# Patient Record
Sex: MALE | Race: WHITE | NOT HISPANIC OR LATINO | Employment: OTHER | URBAN - NONMETROPOLITAN AREA
[De-identification: names, ages, dates, MRNs, and addresses within clinical notes are randomized per-mention and may not be internally consistent; named-entity substitution may affect disease eponyms.]

---

## 2017-10-31 ENCOUNTER — HOSPITAL ENCOUNTER (OUTPATIENT)
Dept: NUCLEAR MEDICINE | Facility: HOSPITAL | Age: 67
Setting detail: NUCLEAR MEDICINE
End: 2017-10-31
Attending: FAMILY MEDICINE
Payer: MEDICARE

## 2017-10-31 ENCOUNTER — HOSPITAL ENCOUNTER (OUTPATIENT)
Dept: CARDIOLOGY | Facility: HOSPITAL | Age: 67
Setting detail: NUCLEAR MEDICINE
End: 2017-10-31
Attending: FAMILY MEDICINE
Payer: MEDICARE

## 2017-10-31 ENCOUNTER — HOSPITAL ENCOUNTER (OUTPATIENT)
Dept: NUCLEAR MEDICINE | Facility: HOSPITAL | Age: 67
Setting detail: NUCLEAR MEDICINE
Discharge: HOME | End: 2017-10-31
Attending: FAMILY MEDICINE | Admitting: FAMILY MEDICINE
Payer: MEDICARE

## 2017-10-31 DIAGNOSIS — E11.9 CONTROLLED DIABETES MELLITUS TYPE II WITHOUT COMPLICATION (H): ICD-10-CM

## 2017-10-31 DIAGNOSIS — R68.89 EXERCISE INTOLERANCE: ICD-10-CM

## 2017-10-31 DIAGNOSIS — R06.02 SOB (SHORTNESS OF BREATH): ICD-10-CM

## 2017-10-31 PROCEDURE — A9500 TC99M SESTAMIBI: HCPCS | Performed by: RADIOLOGY

## 2017-10-31 PROCEDURE — 78452 HT MUSCLE IMAGE SPECT MULT: CPT | Mod: TC

## 2017-10-31 PROCEDURE — 93018 CV STRESS TEST I&R ONLY: CPT | Performed by: INTERNAL MEDICINE

## 2017-10-31 PROCEDURE — 34300033 ZZH RX 343: Performed by: RADIOLOGY

## 2017-10-31 PROCEDURE — 93017 CV STRESS TEST TRACING ONLY: CPT

## 2017-10-31 PROCEDURE — 93016 CV STRESS TEST SUPVJ ONLY: CPT | Performed by: INTERNAL MEDICINE

## 2017-10-31 RX ADMIN — Medication 10 MILLICURIE: at 07:15

## 2017-10-31 RX ADMIN — Medication 30 MILLICURIE: at 10:14

## 2019-03-12 ENCOUNTER — APPOINTMENT (OUTPATIENT)
Dept: GENERAL RADIOLOGY | Facility: HOSPITAL | Age: 69
End: 2019-03-12
Attending: INTERNAL MEDICINE
Payer: MEDICARE

## 2019-03-12 ENCOUNTER — HOSPITAL ENCOUNTER (EMERGENCY)
Facility: HOSPITAL | Age: 69
Discharge: HOME | End: 2019-03-12
Attending: INTERNAL MEDICINE | Admitting: INTERNAL MEDICINE
Payer: MEDICARE

## 2019-03-12 VITALS
HEART RATE: 92 BPM | SYSTOLIC BLOOD PRESSURE: 159 MMHG | RESPIRATION RATE: 16 BRPM | TEMPERATURE: 99.4 F | DIASTOLIC BLOOD PRESSURE: 118 MMHG | OXYGEN SATURATION: 92 %

## 2019-03-12 DIAGNOSIS — J10.1 INFLUENZA A: ICD-10-CM

## 2019-03-12 LAB
FLUAV+FLUBV RNA SPEC QL NAA+PROBE: NEGATIVE
FLUAV+FLUBV RNA SPEC QL NAA+PROBE: POSITIVE
GLUCOSE BLDC GLUCOMTR-MCNC: 142 MG/DL (ref 70–99)
RSV RNA SPEC NAA+PROBE: NEGATIVE
SPECIMEN SOURCE: ABNORMAL

## 2019-03-12 PROCEDURE — 94640 AIRWAY INHALATION TREATMENT: CPT

## 2019-03-12 PROCEDURE — 99284 EMERGENCY DEPT VISIT MOD MDM: CPT | Mod: 25

## 2019-03-12 PROCEDURE — 40000275 ZZH STATISTIC RCP TIME EA 10 MIN

## 2019-03-12 PROCEDURE — 94664 DEMO&/EVAL PT USE INHALER: CPT | Mod: XU

## 2019-03-12 PROCEDURE — 00000146 ZZHCL STATISTIC GLUCOSE BY METER IP

## 2019-03-12 PROCEDURE — 25000125 ZZHC RX 250: Performed by: INTERNAL MEDICINE

## 2019-03-12 PROCEDURE — 71046 X-RAY EXAM CHEST 2 VIEWS: CPT | Mod: TC

## 2019-03-12 PROCEDURE — 99284 EMERGENCY DEPT VISIT MOD MDM: CPT | Mod: Z6 | Performed by: INTERNAL MEDICINE

## 2019-03-12 PROCEDURE — 87631 RESP VIRUS 3-5 TARGETS: CPT | Performed by: INTERNAL MEDICINE

## 2019-03-12 RX ORDER — IBUPROFEN 800 MG/1
800 TABLET, FILM COATED ORAL EVERY 8 HOURS PRN
COMMUNITY
End: 2022-06-02 | Stop reason: ALTCHOICE

## 2019-03-12 RX ORDER — OSELTAMIVIR PHOSPHATE 75 MG/1
75 CAPSULE ORAL 2 TIMES DAILY
Qty: 10 CAPSULE | Refills: 0 | Status: SHIPPED | OUTPATIENT
Start: 2019-03-12 | End: 2019-03-17

## 2019-03-12 RX ORDER — IPRATROPIUM BROMIDE AND ALBUTEROL SULFATE 2.5; .5 MG/3ML; MG/3ML
3 SOLUTION RESPIRATORY (INHALATION) ONCE
Status: COMPLETED | OUTPATIENT
Start: 2019-03-12 | End: 2019-03-12

## 2019-03-12 RX ORDER — ALBUTEROL SULFATE 90 UG/1
2 AEROSOL, METERED RESPIRATORY (INHALATION) EVERY 6 HOURS PRN
Qty: 18 G | Refills: 0 | Status: SHIPPED | OUTPATIENT
Start: 2019-03-12 | End: 2020-12-22

## 2019-03-12 RX ORDER — DAPSONE 25 MG/1
25 TABLET ORAL EVERY OTHER DAY
COMMUNITY
End: 2020-12-22

## 2019-03-12 RX ORDER — CODEINE PHOSPHATE AND GUAIFENESIN 10; 100 MG/5ML; MG/5ML
1-2 SOLUTION ORAL EVERY 4 HOURS PRN
Qty: 4 OZ | Refills: 0 | Status: SHIPPED | OUTPATIENT
Start: 2019-03-12 | End: 2020-12-22

## 2019-03-12 RX ADMIN — IPRATROPIUM BROMIDE AND ALBUTEROL SULFATE 3 ML: .5; 3 SOLUTION RESPIRATORY (INHALATION) at 07:38

## 2019-03-12 NOTE — ED PROVIDER NOTES
Care of patient assumed from Dr. Bar Siddiqui at change of shift.    Influenza A is positive.  He has been ill since Sunday afternoon so he is still in the window for treatment with Tamiflu and chooses to have Tamiflu.  Also advised using a [place] pot for sinus congestion, Tylenol for aches and pains and humidifier in the bedroom at night to help with cough.    Follow-up with primary care as needed.    Dx: Influenza A     Anna Huerta MD  03/12/19 0971

## 2019-03-12 NOTE — ED AVS SNAPSHOT
[facility]  [address] [address]  Phone:  [phone]                                    Scott Akhtar   MRN: [id number]    Department:  [facility]   Date of Visit:  3/12/2019           After Visit Summary Signature Page    I have received my discharge instructions, and my questions have been answered. I have discussed any challenges I see with this plan with the nurse or doctor.    ..........................................................................................................................................  Patient/Patient Representative Signature      ..........................................................................................................................................  Patient Representative Print Name and Relationship to Patient    ..................................................               ................................................  Date                                   Time    ..........................................................................................................................................  Reviewed by Signature/Title    ...................................................              ..............................................  Date                                               Time          22EPIC Rev 08/18

## 2019-03-17 ASSESSMENT — ENCOUNTER SYMPTOMS
COUGH: 1
SHORTNESS OF BREATH: 1
SORE THROAT: 1
WHEEZING: 1
FATIGUE: 1

## 2019-03-17 NOTE — ED PROVIDER NOTES
History     Chief Complaint   Patient presents with     Cough     The history is provided by the patient.   URI   Presenting symptoms: congestion, cough, fatigue and sore throat    Severity:  Moderate  Onset quality:  Gradual  Duration:  3 days  Timing:  Constant  Chronicity:  New  Associated symptoms: wheezing      Scott Akhtar is a 68 year old male who    Allergies:  Allergies   Allergen Reactions     Pseudoephedrine Hcl      Sudafed      Simvastatin GI Disturbance     Sulfa Drugs Nausea       Problem List:    Patient Active Problem List    Diagnosis Date Noted     SNHL (sensorineural hearing loss) 2013     Priority: Medium        Past Medical History:    Past Medical History:   Diagnosis Date     Diabetes (H)      External thrombosed hemorrhoids      Hiatal hernia      Hypertension      Need for prophylactic vaccination and inoculation against unspecified single bacterial disease      Other chest pain        Past Surgical History:    Past Surgical History:   Procedure Laterality Date     BACK SURGERY      laminectomy L5S1     COLONOSCOPY       COLONOSCOPY N/A 10/14/2016    Procedure: COLONOSCOPY;  Surgeon: Daljit Salazar MD;  Location: [facility]     L5 S1 Laminectomy       ROTATOR CUFF REPAIR RT/LT  2006     TONSILLECTOMY  1971       Family History:    Family History   Problem Relation Age of Onset     Diabetes Father        Social History:  Marital Status:   [2]  Social History     Tobacco Use     Smoking status: Former Smoker     Types: Cigarettes     Last attempt to quit: 1985     Years since quittin.2     Smokeless tobacco: Never Used   Substance Use Topics     Alcohol use: Yes     Alcohol/week: 0.0 oz     Comment: 3 beers daily      Drug use: Not on file        Medications:      albuterol (PROAIR HFA/PROVENTIL HFA/VENTOLIN HFA) 108 (90 Base) MCG/ACT inhaler   ALLOPURINOL PO   ASPIRIN EC PO   Cholecalciferol (VITAMIN D3 PO)   dapsone (ACZONE) 25 MG tablet    guaiFENesin-codeine (ROBITUSSIN AC) 100-10 MG/5ML solution   HYDROCHLOROTHIAZIDE PO   ibuprofen (ADVIL/MOTRIN) 800 MG tablet   loratadine (CLARITIN) 10 MG tablet   METFORMIN HCL PO   Multiple Minerals (CALCIUM-MAGNESIUM-ZINC) TABS   Omega-3 Fatty Acids (OMEGA-3 FISH OIL PO)   oseltamivir (TAMIFLU) 75 MG capsule   Potassium Chloride (KLOR-CON 10 PO)   COLCHICINE PO         Review of Systems   Constitutional: Positive for fatigue.   HENT: Positive for congestion and sore throat.    Respiratory: Positive for cough, shortness of breath and wheezing.        Physical Exam   BP: 132/79  Pulse: 87  Temp: 99.3  F (37.4  C)  Resp: 20  SpO2: 94 %      Physical Exam   Constitutional: He is oriented to person, place, and time. He appears well-developed and well-nourished.   HENT:   Head: Normocephalic and atraumatic.   Eyes: Conjunctivae are normal. Pupils are equal, round, and reactive to light.   Neck: Normal range of motion. Neck supple. No JVD present. No tracheal deviation present. No thyromegaly present.   Cardiovascular: Normal rate, regular rhythm, normal heart sounds and intact distal pulses. Exam reveals no gallop.   No murmur heard.  Pulmonary/Chest: Effort normal and breath sounds normal. No stridor. No respiratory distress. He has no wheezes. He has no rales. He exhibits no tenderness.   Abdominal: Soft. Bowel sounds are normal. He exhibits no distension and no mass. There is no tenderness. There is no rebound and no guarding.   Musculoskeletal: Normal range of motion. He exhibits no edema or tenderness.   Lymphadenopathy:     He has no cervical adenopathy.   Neurological: He is alert and oriented to person, place, and time.   Skin: Skin is warm. No rash noted. No erythema. No pallor.   Psychiatric: His behavior is normal.   Nursing note and vitals reviewed.      ED Course        Procedures                   No results found for this or any previous visit (from the past 24 hour(s)).    Medications   ipratropium -  albuterol 0.5 mg/2.5 mg/3 mL (DUONEB) neb solution 3 mL (3 mLs Nebulization Given 3/12/19 0750)       Assessments & Plan (with Medical Decision Making)   Flu like symptoms for 3 days, feel more SOB, wheezing  Neb x 1 given  CXR ordered   S/o to Dr Martinez at the change of shift  I have reviewed the nursing notes.    I have reviewed the findings, diagnosis, plan and need for follow up with the patient.         Medication List      Started    albuterol 108 (90 Base) MCG/ACT inhaler  Commonly known as:  PROAIR HFA/PROVENTIL HFA/VENTOLIN HFA  2 puffs, Inhalation, EVERY 6 HOURS PRN     guaiFENesin-codeine 100-10 MG/5ML solution  Commonly known as:  ROBITUSSIN AC  1-2 tsp., Oral, EVERY 4 HOURS PRN     oseltamivir 75 MG capsule  Commonly known as:  TAMIFLU  75 mg, Oral, 2 TIMES DAILY            Final diagnoses:   Influenza A       3/12/2019   [facility]     Bar Gottlieb MD  03/17/19 0289

## 2020-08-24 ENCOUNTER — TRANSFERRED RECORDS (OUTPATIENT)
Dept: HEALTH INFORMATION MANAGEMENT | Facility: CLINIC | Age: 70
End: 2020-08-24

## 2020-11-09 ENCOUNTER — NURSE TRIAGE (OUTPATIENT)
Dept: FAMILY MEDICINE | Facility: OTHER | Age: 70
End: 2020-11-09

## 2020-11-09 DIAGNOSIS — Z20.822 SUSPECTED COVID-19 VIRUS INFECTION: Primary | ICD-10-CM

## 2020-11-09 NOTE — TELEPHONE ENCOUNTER
"    Reason for Disposition    [1] COVID-19 infection suspected by caller or triager AND [2] mild symptoms (cough, fever, or others) AND [3] no complications or SOB    Answer Assessment - Initial Assessment Questions  1. COVID-19 DIAGNOSIS: \"Who made your Coronavirus (COVID-19) diagnosis?\" \"Was it confirmed by a positive lab test?\" If not diagnosed by a HCP, ask \"Are there lots of cases (community spread) where you live?\" (See [facility] health department website, if unsure)      no  2. ONSET: \"When did the COVID-19 symptoms start?\"       4 weeks ago  3. WORST SYMPTOM: \"What is your worst symptom?\" (e.g., cough, fever, shortness of breath, muscle aches)      Cough, sob, body aches  4. COUGH: \"Do you have a cough?\" If so, ask: \"How bad is the cough?\"        cough  5. FEVER: \"Do you have a fever?\" If so, ask: \"What is your temperature, how was it measured, and when did it start?\"      no  6. RESPIRATORY STATUS: \"Describe your breathing?\" (e.g., shortness of breath, wheezing, unable to speak)       wheezing  7. BETTER-SAME-WORSE: \"Are you getting better, staying the same or getting worse compared to yesterday?\"  If getting worse, ask, \"In what way?\"      worse  8. HIGH RISK DISEASE: \"Do you have any chronic medical problems?\" (e.g., asthma, heart or lung disease, weak immune system, etc.)      Diabetic, lupus  9. PREGNANCY: \"Is there any chance you are pregnant?\" \"When was your last menstrual period?\"      no  10. OTHER SYMPTOMS: \"Do you have any other symptoms?\"  (e.g., chills, fatigue, headache, loss of smell or taste, muscle pain, sore throat)        Fatigue, headache, tickle throat    Protocols used: CORONAVIRUS (COVID-19) DIAGNOSED OR [id number] 8.4.20      "

## 2020-11-10 ENCOUNTER — OFFICE VISIT (OUTPATIENT)
Dept: FAMILY MEDICINE | Facility: OTHER | Age: 70
End: 2020-11-10
Attending: FAMILY MEDICINE
Payer: COMMERCIAL

## 2020-11-10 DIAGNOSIS — Z20.822 SUSPECTED COVID-19 VIRUS INFECTION: ICD-10-CM

## 2020-11-10 DIAGNOSIS — Z20.822 COVID-19 RULED OUT: Primary | ICD-10-CM

## 2020-11-10 PROCEDURE — U0003 INFECTIOUS AGENT DETECTION BY NUCLEIC ACID (DNA OR RNA); SEVERE ACUTE RESPIRATORY SYNDROME CORONAVIRUS 2 (SARS-COV-2) (CORONAVIRUS DISEASE [COVID-19]), AMPLIFIED PROBE TECHNIQUE, MAKING USE OF HIGH THROUGHPUT TECHNOLOGIES AS DESCRIBED BY CMS-2020-01-R: HCPCS | Mod: ZL | Performed by: FAMILY MEDICINE

## 2020-11-11 ENCOUNTER — HOSPITAL ENCOUNTER (EMERGENCY)
Facility: HOSPITAL | Age: 70
Discharge: HOME | End: 2020-11-11
Attending: NURSE PRACTITIONER | Admitting: NURSE PRACTITIONER
Payer: COMMERCIAL

## 2020-11-11 ENCOUNTER — APPOINTMENT (OUTPATIENT)
Dept: GENERAL RADIOLOGY | Facility: HOSPITAL | Age: 70
End: 2020-11-11
Attending: NURSE PRACTITIONER
Payer: COMMERCIAL

## 2020-11-11 VITALS
SYSTOLIC BLOOD PRESSURE: 131 MMHG | TEMPERATURE: 97.7 F | OXYGEN SATURATION: 95 % | DIASTOLIC BLOOD PRESSURE: 94 MMHG | HEART RATE: 100 BPM | RESPIRATION RATE: 16 BRPM

## 2020-11-11 DIAGNOSIS — Z87.891 PERSONAL HISTORY OF TOBACCO USE, PRESENTING HAZARDS TO HEALTH: ICD-10-CM

## 2020-11-11 DIAGNOSIS — J20.9 ACUTE BRONCHITIS WITH SYMPTOMS > 10 DAYS: Primary | ICD-10-CM

## 2020-11-11 LAB
SARS-COV-2 RNA SPEC QL NAA+PROBE: NOT DETECTED
SPECIMEN SOURCE: NORMAL

## 2020-11-11 PROCEDURE — 999N000157 HC STATISTIC RCP TIME EA 10 MIN

## 2020-11-11 PROCEDURE — 99214 OFFICE O/P EST MOD 30 MIN: CPT | Performed by: NURSE PRACTITIONER

## 2020-11-11 PROCEDURE — 94664 DEMO&/EVAL PT USE INHALER: CPT | Mod: 59

## 2020-11-11 PROCEDURE — 250N000013 HC RX MED GY IP 250 OP 250 PS 637: Performed by: NURSE PRACTITIONER

## 2020-11-11 PROCEDURE — G0463 HOSPITAL OUTPT CLINIC VISIT: HCPCS | Mod: 25

## 2020-11-11 PROCEDURE — 94640 AIRWAY INHALATION TREATMENT: CPT

## 2020-11-11 PROCEDURE — 71045 X-RAY EXAM CHEST 1 VIEW: CPT

## 2020-11-11 RX ORDER — ALBUTEROL SULFATE 90 UG/1
2 AEROSOL, METERED RESPIRATORY (INHALATION) ONCE
Status: COMPLETED | OUTPATIENT
Start: 2020-11-11 | End: 2020-11-11

## 2020-11-11 RX ORDER — INHALER, ASSIST DEVICES
SPACER (EA) MISCELLANEOUS
Qty: 1 EACH | Refills: 0 | Status: SHIPPED | OUTPATIENT
Start: 2020-11-11 | End: 2021-11-30

## 2020-11-11 RX ORDER — AZITHROMYCIN 250 MG/1
500 TABLET, FILM COATED ORAL ONCE
Status: DISCONTINUED | OUTPATIENT
Start: 2020-11-11 | End: 2020-11-11

## 2020-11-11 RX ORDER — BENZONATATE 100 MG/1
100 CAPSULE ORAL 3 TIMES DAILY PRN
Qty: 15 CAPSULE | Refills: 0 | Status: SHIPPED | OUTPATIENT
Start: 2020-11-11 | End: 2021-10-19

## 2020-11-11 RX ORDER — AZITHROMYCIN 250 MG/1
TABLET, FILM COATED ORAL
Qty: 6 TABLET | Refills: 0 | Status: SHIPPED | OUTPATIENT
Start: 2020-11-11 | End: 2020-11-16

## 2020-11-11 RX ORDER — AZITHROMYCIN 250 MG/1
250 TABLET, FILM COATED ORAL DAILY
Status: DISCONTINUED | OUTPATIENT
Start: 2020-11-12 | End: 2020-11-11

## 2020-11-11 RX ADMIN — ALBUTEROL SULFATE 2 PUFF: 90 AEROSOL, METERED RESPIRATORY (INHALATION) at 14:26

## 2020-11-11 ASSESSMENT — ENCOUNTER SYMPTOMS
VOMITING: 0
EYE ITCHING: 0
CHILLS: 0
COUGH: 1
NAUSEA: 1
SHORTNESS OF BREATH: 1
SORE THROAT: 0
DIZZINESS: 0
EYE PAIN: 0
DIARRHEA: 0
HEADACHES: 0
WHEEZING: 0
WEAKNESS: 0
PALPITATIONS: 0
TROUBLE SWALLOWING: 0
FATIGUE: 1
PSYCHIATRIC NEGATIVE: 1
ABDOMINAL PAIN: 0
FEVER: 0
EYE REDNESS: 0

## 2020-11-11 NOTE — ED PROVIDER NOTES
History     Chief Complaint   Patient presents with     Cough     c/o cough for over 3 weeks, notes cough so hard he will have dry heaves     HPI  Scott Akhtar is a 70 year old male who presents to urgent care today for complaints of cough, body aches and shortness of breath.  Denies fever, chills, vomiting, diarrhea and chest pain.  Patient states it is the nagging cough and SOB which isn't getting better.  Cough started 4-6 weeks ago and hasn't improved.  Currently waiting on COVID test results.  No other concerns.    Allergies:  Allergies   Allergen Reactions     Pseudoephedrine Hcl      Sudafed      Simvastatin GI Disturbance     Sulfa Drugs Nausea       Problem List:    Patient Active Problem List    Diagnosis Date Noted     SNHL (sensorineural hearing loss) 2013     Priority: Medium        Past Medical History:    Past Medical History:   Diagnosis Date     Diabetes (H)      External thrombosed hemorrhoids      Hiatal hernia      Hypertension      Need for prophylactic vaccination and inoculation against unspecified single bacterial disease      Other chest pain        Past Surgical History:    Past Surgical History:   Procedure Laterality Date     BACK SURGERY      laminectomy L5S1     COLONOSCOPY       COLONOSCOPY N/A 10/14/2016    Procedure: COLONOSCOPY;  Surgeon: Daljit Salazar MD;  Location: [facility]     L5 S1 Laminectomy       ROTATOR CUFF REPAIR RT/LT  2006     TONSILLECTOMY  1971       Family History:    Family History   Problem Relation Age of Onset     Diabetes Father        Social History:  Marital Status:   [2]  Social History     Tobacco Use     Smoking status: Former Smoker     Types: Cigarettes     Quit date: 1985     Years since quittin.8     Smokeless tobacco: Never Used   Substance Use Topics     Alcohol use: Yes     Alcohol/week: 0.0 standard drinks     Comment: 3 beers daily      Drug use: Not on file        Medications:         ALLOPURINOL PO        ASPIRIN EC PO       azithromycin (ZITHROMAX Z-ILDA) 250 MG tablet       benzonatate (TESSALON) 100 MG capsule       Cholecalciferol (VITAMIN D3 PO)       HYDROCHLOROTHIAZIDE PO       loratadine (CLARITIN) 10 MG tablet       METFORMIN HCL PO       Multiple Minerals (CALCIUM-MAGNESIUM-ZINC) TABS       Omega-3 Fatty Acids (OMEGA-3 FISH OIL PO)       Potassium Chloride (KLOR-CON 10 PO)       spacer (OPTICHAMBER HARRIET) holding chamber       albuterol (PROAIR HFA/PROVENTIL HFA/VENTOLIN HFA) 108 (90 Base) MCG/ACT inhaler       dapsone (ACZONE) 25 MG tablet       guaiFENesin-codeine (ROBITUSSIN AC) 100-10 MG/5ML solution       ibuprofen (ADVIL/MOTRIN) 800 MG tablet      Review of Systems   Constitutional: Positive for fatigue. Negative for chills and fever.   HENT: Negative for congestion, ear pain, sore throat and trouble swallowing.    Eyes: Negative for pain, redness and itching.   Respiratory: Positive for cough and shortness of breath. Negative for wheezing.    Cardiovascular: Negative for chest pain and palpitations.   Gastrointestinal: Positive for nausea. Negative for abdominal pain, diarrhea and vomiting.   Neurological: Negative for dizziness, weakness and headaches.   Psychiatric/Behavioral: Negative.      Physical Exam   BP: 131/94  Pulse: 100  Temp: 97.7  F (36.5  C)  Resp: 16  SpO2: 95 %    Physical Exam  Vitals signs and nursing note reviewed.   HENT:      Head: Normocephalic.      Right Ear: Tympanic membrane, ear canal and external ear normal.      Left Ear: Tympanic membrane, ear canal and external ear normal.      Nose: Nose normal.      Mouth/Throat:      Mouth: Mucous membranes are moist.      Pharynx: Oropharynx is clear.   Eyes:      Extraocular Movements: Extraocular movements intact.      Conjunctiva/sclera: Conjunctivae normal.      Pupils: Pupils are equal, round, and reactive to light.   Neck:      Musculoskeletal: Normal range of motion and neck supple.   Cardiovascular:      Rate and Rhythm:  Normal rate and regular rhythm.      Pulses: Normal pulses.      Heart sounds: Normal heart sounds.   Pulmonary:      Effort: Pulmonary effort is normal.      Breath sounds: Normal breath sounds. No wheezing.   Abdominal:      General: Bowel sounds are normal.      Palpations: Abdomen is soft.      Tenderness: There is no abdominal tenderness.   Skin:     General: Skin is warm.      Capillary Refill: Capillary refill takes less than 2 seconds.   Neurological:      General: No focal deficit present.      Mental Status: He is alert.   Psychiatric:         Mood and Affect: Mood normal.       ED Course     Results for orders placed or performed during the hospital encounter of 11/11/20 (from the past 24 hour(s))   XR Chest Port 1 View    Narrative    XR CHEST PORT 1 VW    HISTORY: 70 yearsMale cough and shortness of breath. covid test in  process.    TECHNIQUE: A single view of the chest was performed    COMPARISON: 3/12/2019    FINDINGS: Heart size and pulmonary vascularity are within normal  limits. Lungs are clear. No consolidating airspace opacities are  present.        Impression    IMPRESSION: Clear chest    JOSSELINE VALENTIN MD       Medications   albuterol (PROAIR HFA/PROVENTIL HFA/VENTOLIN HFA) 108 (90 Base) MCG/ACT inhaler 2 puff (2 puffs Inhalation Given 11/11/20 1426)       Assessments & Plan (with Medical Decision Making)     I have reviewed the nursing notes.    I have reviewed the findings, diagnosis, plan and need for follow up with the patient.  (J20.9) Acute bronchitis with symptoms > 10 days  (primary encounter diagnosis)  Plan:   Continues to wait for COVID results.  Albuterol for SOB  Tessalon pearls for cough  Zithromax as acute bronchitis 4-6 weeks in duration.    Symptomatic treatments recommended.  - Ensure you are staying hydrated by drinking plenty of fluids or eating foods such as popsicles, jello, pudding.  - Honey can be soothing for sore throat  - Warm salt water gurgles can help soothe  sore throat  - Rest  - Humidifier can help with congestion and help keep mucus membranes such as throat and nose from drying out.  - Sleeping slightly propped up can help with congestion and postnasal drainage that can worsen cough at bedtime.  - As long as you have never been told to take Tylenol and/or Ibuprofen you can use them to manage fever and body aches per package instructions  Make sure you eat when you take ibuprofen to avoid stomach upset.  - OTC cough medications per package instructions to help with cough. Check to see if the cough/cold medication already has acetaminophen (Tylenol) in it. If it does avoid taking additional Tylenol.  - If sudden onset of new fever, worsening symptoms return for further evaluation.  - OTC nasal steroid such as Flonase can help decrease sinus inflammation to help with congestion.  - Education provided on symptoms of post-viral bacterial infections including ear infection and pneumonia. This would require re-evaluation for treatment.    Follow up with primary care provider or return to urgent care/ED with any worsening in condition or additional concerns.     Discharge Medication List as of 11/11/2020  2:29 PM      START taking these medications    Details   azithromycin (ZITHROMAX Z-ILDA) 250 MG tablet Two tablets on the first day, then one tablet daily for the next 4 days, Disp-6 tablet, R-0, E-Prescribe      benzonatate (TESSALON) 100 MG capsule Take 1 capsule (100 mg) by mouth 3 times daily as needed for cough 1-2 tablets 3 times a day as needed, Disp-15 capsule, R-0, E-Prescribe      spacer (OPTICHAMBER HARRIET) holding chamber Use with inhaler., Disp-1 each, R-0, E-Prescribe             Final diagnoses:   Acute bronchitis with symptoms > 10 days       11/11/2020   [facility]     Kayley Carpio, THOMAS  11/11/20 3842

## 2020-11-11 NOTE — DISCHARGE INSTRUCTIONS
Albuterol for shortness of breath  Tessalon pearls for cough  Z-jessee due to extended duration of illness.  Symptomatic treatments recommended.  - Ensure you are staying hydrated by drinking plenty of fluids or eating foods such as popsicles, jello, pudding.  - Honey can be soothing for sore throat  - Warm salt water gurgles can help soothe sore throat  - Rest  - Humidifier can help with congestion and help keep mucus membranes such as throat and nose from drying out.  - Sleeping slightly propped up can help with congestion and postnasal drainage that can worsen cough at bedtime.  - As long as you have never been told to take Tylenol and/or Ibuprofen you can use them to manage fever and body aches per package instructions  Make sure you eat when you take ibuprofen to avoid stomach upset.  - OTC cough medications per package instructions to help with cough. Check to see if the cough/cold medication already has acetaminophen (Tylenol) in it. If it does avoid taking additional Tylenol.  - If sudden onset of new fever, worsening symptoms return for further evaluation.  - OTC nasal steroid such as Flonase can help decrease sinus inflammation to help with congestion.  - Education provided on symptoms of post-viral bacterial infections including ear infection and pneumonia. This would require re-evaluation for treatment.    Follow up with primary care provider or return to urgent care/ED with any worsening in condition or additional concerns.

## 2020-11-11 NOTE — ED AVS SNAPSHOT
[facility]  [address] [address]  Phone: [phone]                                    Scott Akhtar   MRN: [id number]    Department: [facility]   Date of Visit: 11/11/2020           After Visit Summary Signature Page    I have received my discharge instructions, and my questions have been answered. I have discussed any challenges I see with this plan with the nurse or doctor.    ..........................................................................................................................................  Patient/Patient Representative Signature      ..........................................................................................................................................  Patient Representative Print Name and Relationship to Patient    ..................................................               ................................................  Date                                   Time    ..........................................................................................................................................  Reviewed by Signature/Title    ...................................................              ..............................................  Date                                               Time          22EPIC Rev 08/18

## 2020-11-14 ENCOUNTER — HEALTH MAINTENANCE LETTER (OUTPATIENT)
Age: 70
End: 2020-11-14

## 2020-11-18 ENCOUNTER — TELEPHONE (OUTPATIENT)
Dept: EMERGENCY MEDICINE | Facility: HOSPITAL | Age: 70
End: 2020-11-18

## 2020-11-20 NOTE — ED PROVIDER NOTES
Received a call from this patient's pharmacy questioning why patient got a prescription for a spacer but no prescription for inhaler. Review of patient's visit on 11/11/2020 by another provider showed patient was given an inhaler during his visit in urgent care. He was to use inhaler with spacer provided. Pharmacist stated he will inform patient and have him follow up with PCP if he needs another inhaler.      Yuli, Prudence, CNP  11/19/20 0843

## 2020-12-21 ENCOUNTER — TRANSFERRED RECORDS (OUTPATIENT)
Dept: HEALTH INFORMATION MANAGEMENT | Facility: CLINIC | Age: 70
End: 2020-12-21

## 2020-12-22 ENCOUNTER — OFFICE VISIT (OUTPATIENT)
Dept: OTOLARYNGOLOGY | Facility: OTHER | Age: 70
End: 2020-12-22
Attending: NURSE PRACTITIONER
Payer: COMMERCIAL

## 2020-12-22 VITALS
OXYGEN SATURATION: 96 % | SYSTOLIC BLOOD PRESSURE: 128 MMHG | TEMPERATURE: 97.8 F | DIASTOLIC BLOOD PRESSURE: 82 MMHG | WEIGHT: 220 LBS | HEIGHT: 69 IN | BODY MASS INDEX: 32.58 KG/M2 | HEART RATE: 68 BPM

## 2020-12-22 DIAGNOSIS — H61.21 CERUMINOSIS, RIGHT: Primary | ICD-10-CM

## 2020-12-22 DIAGNOSIS — H61.22 IMPACTED CERUMEN OF LEFT EAR: ICD-10-CM

## 2020-12-22 PROCEDURE — 92504 EAR MICROSCOPY EXAMINATION: CPT | Performed by: NURSE PRACTITIONER

## 2020-12-22 PROCEDURE — 69210 REMOVE IMPACTED EAR WAX UNI: CPT | Performed by: NURSE PRACTITIONER

## 2020-12-22 PROCEDURE — 99201 PR OFFICE/OUTPT VISIT, NEW, LEVEL I: CPT | Mod: 25 | Performed by: NURSE PRACTITIONER

## 2020-12-22 PROCEDURE — G0463 HOSPITAL OUTPT CLINIC VISIT: HCPCS

## 2020-12-22 ASSESSMENT — MIFFLIN-ST. JEOR: SCORE: 1753.29

## 2020-12-22 ASSESSMENT — PAIN SCALES - GENERAL: PAINLEVEL: MILD PAIN (2)

## 2020-12-22 NOTE — LETTER
12/22/2020         RE: Scott Akhtar  [address] [facility] [address]        Dear Colleague,    Thank you for referring your patient, Scott Akhtar, to the [facility]. Please see a copy of my visit note below.    Otolaryngology Note         Chief Complaint:     Patient presents with:  Cerumen Impaction: Ear Cleaning           History of Present Illness:     Scott Akhtar is a 70 year old male who presents today for ear cleaning.  He reports he has new hearing aids, was seen for a adjustment and was advised he had impacted cerumen. He reports he has very sensitive ear canals, has had tenderness with cleaning in the past.     He cleans his own ears at home with home kit, he uses debrox for 2 days, then flushes with bulb.  He had audiogram in November and had clear ears.   No concerns with hearing.  + tinnitus chronic, hearing aids do not improve tinnitus  No vertigo, facial numbness or weakness.      He reports some light-headedness when rising from sitting to standing  No otalgia, otorrhea.  He reports left lower wisdom tooth is tender today.  No drainage or fevers.  He had wisdom tooth extraction.  He has a dentist appt upcoming  No hx of COM or otologic surgeries.   He had bad ear infection as a young child but does not feel he had any ear surgery.          Medications:     Current Outpatient Rx   Medication Sig Dispense Refill     ALLOPURINOL PO Take 100 mg by mouth daily       ASPIRIN EC PO Take 81 mg by mouth daily       benzonatate (TESSALON) 100 MG capsule Take 1 capsule (100 mg) by mouth 3 times daily as needed for cough 1-2 tablets 3 times a day as needed 15 capsule 0     Cholecalciferol (VITAMIN D3 PO) Take 2,000 Units by mouth daily        HYDROCHLOROTHIAZIDE PO Take 25 mg by mouth daily        ibuprofen (ADVIL/MOTRIN) 800 MG tablet Take 800 mg by mouth every 8 hours as needed for moderate pain       loratadine (CLARITIN) 10 MG tablet Take 10 mg by mouth  "daily as needed        METFORMIN HCL PO Take 500 mg by mouth daily (with breakfast)       Multiple Minerals (CALCIUM-MAGNESIUM-ZINC) TABS Take 1 tablet by mouth daily       Omega-3 Fatty Acids (OMEGA-3 FISH OIL PO) Take 1,200 mg by mouth daily        Potassium Chloride (KLOR-CON 10 PO) Take 1 tablet by mouth daily       spacer (OPTICHAMBER HARRIET) holding chamber Use with inhaler. 1 each 0            Allergies:     Allergies: Pseudoephedrine hcl, Simvastatin, and Sulfa drugs          Past Medical History:     Past Medical History:   Diagnosis Date     Aneurysm of thoracic aorta (H)      Diabetes (H)      External thrombosed hemorrhoids      Hiatal hernia      Hypertension      Need for prophylactic vaccination and inoculation against unspecified single bacterial disease      Other chest pain             Past Surgical History:     Past Surgical History:   Procedure Laterality Date     BACK SURGERY      laminectomy L5S1     COLONOSCOPY       COLONOSCOPY N/A 10/14/2016    Procedure: COLONOSCOPY;  Surgeon: Daljit Salazar MD;  Location: [facility]     L5 S1 Laminectomy  1991     ROTATOR CUFF REPAIR RT/LT  2006     SHOULDER SURGERY Right      TONSILLECTOMY  1971            Social History:     Social History     Tobacco Use     Smoking status: Former Smoker     Types: Cigarettes     Quit date: 1985     Years since quittin.9     Smokeless tobacco: Never Used   Substance Use Topics     Alcohol use: Yes     Alcohol/week: 0.0 standard drinks     Comment: 3 beers daily      Drug use: None            Review of Systems:     ROS: See HPI         Physical Exam:     /82 (Cuff Size: Adult Large)   Pulse 68   Temp 97.8  F (36.6  C) (Tympanic)   Ht 1.753 m (5' 9\")   Wt 99.8 kg (220 lb)   SpO2 96%   BMI 32.49 kg/m      General - The patient is well nourished and well developed, and appears to have good nutritional status.  Alert and oriented to person and place, answers questions and cooperates " with examination appropriately.   Head and Face - Normocephalic and atraumatic, with no gross asymmetry noted.  The facial nerve is intact, with strong symmetric movements.  Voice and Breathing - The patient was breathing comfortably without the use of accessory muscles. There was no wheezing, stridor. The patients voice was clear and strong, and had appropriate pitch and quality.  Ears - The ears were examined with binocular microscopy and with otoscope.  External ears normal. Right canal with ceruminosis.  Left canal cerumen impacted.  The right ear was cleaned with #7 sucker.   Right tympanic membrane is intact without effusion, retraction or mass. The left ear was cleaned with #7 sucker.  Left tympanic membrane is intact without effusion, retraction or mass.  Eyes - Extraocular movements intact, sclera were not icteric or injected, conjunctiva were pink and moist.  Mouth - Examination of the oral cavity showed pink, healthy oral mucosa. Dentition in good condition. No lesions or ulcerations noted. The tongue was mobile and midline.   Throat - The walls of the oropharynx were smooth, pink, moist, symmetric, and had no lesions or ulcerations.  The tonsillar pillars and soft palate were symmetric. The uvula was midline on elevation.    Neck - Full range of motion on passive movement.  Palpation of the occipital, submental, submandibular, internal jugular chain, and supraclavicular nodes did not demonstrate any abnormal lymph nodes or masses.  Palpation of the thyroid was soft and smooth, with no nodules or goiter appreciated.  The trachea was mobile and midline.  Nose - External contour is symmetric, no gross deflection or scars.  Nasal mucosa is pink and moist with no abnormal mucus.  The septum and turbinates were evaluated with nasal speculum, no polyps, masses, or purulence noted on examination.         Assessment and Plan:       ICD-10-CM    1. Ceruminosis, right  H61.21    2. Impacted cerumen of left ear   H61.22        Follow up in 6 months for repeat ear cleaning  Use debrox drops 5 drops in both ears twice daily for 3-4 days prior to ear cleaning      Ears were cleaned, tolerated well    The ears were cleaned today.  The patient may return here as needed or with their primary physician.  Aural hygiene was discussed.  Avoidance of Q-tips was reiterated.  Avoid flushing the ear canal if there is a possibility of a hole or perforation in the tympanic membrane.   If there are any unresolved concerns regarding hearing loss an audiogram is recommended.      Shanice COCHRAN  [facility] ENT        Again, thank you for allowing me to participate in the care of your patient.        Sincerely,        Shanice Robledo NP

## 2020-12-22 NOTE — PROGRESS NOTES
Otolaryngology Note         Chief Complaint:     Patient presents with:  Cerumen Impaction: Ear Cleaning           History of Present Illness:     Scott Akhtar is a 70 year old male who presents today for ear cleaning.  He reports he has new hearing aids, was seen for a adjustment and was advised he had impacted cerumen. He reports he has very sensitive ear canals, has had tenderness with cleaning in the past.     He cleans his own ears at home with home kit, he uses debrox for 2 days, then flushes with bulb.  He had audiogram in November and had clear ears.   No concerns with hearing.  + tinnitus chronic, hearing aids do not improve tinnitus  No vertigo, facial numbness or weakness.      He reports some light-headedness when rising from sitting to standing  No otalgia, otorrhea.  He reports left lower wisdom tooth is tender today.  No drainage or fevers.  He had wisdom tooth extraction.  He has a dentist appt upcoming  No hx of COM or otologic surgeries.   He had bad ear infection as a young child but does not feel he had any ear surgery.          Medications:     Current Outpatient Rx   Medication Sig Dispense Refill     ALLOPURINOL PO Take 100 mg by mouth daily       ASPIRIN EC PO Take 81 mg by mouth daily       benzonatate (TESSALON) 100 MG capsule Take 1 capsule (100 mg) by mouth 3 times daily as needed for cough 1-2 tablets 3 times a day as needed 15 capsule 0     Cholecalciferol (VITAMIN D3 PO) Take 2,000 Units by mouth daily        HYDROCHLOROTHIAZIDE PO Take 25 mg by mouth daily        ibuprofen (ADVIL/MOTRIN) 800 MG tablet Take 800 mg by mouth every 8 hours as needed for moderate pain       loratadine (CLARITIN) 10 MG tablet Take 10 mg by mouth daily as needed        METFORMIN HCL PO Take 500 mg by mouth daily (with breakfast)       Multiple Minerals (CALCIUM-MAGNESIUM-ZINC) TABS Take 1 tablet by mouth daily       Omega-3 Fatty Acids (OMEGA-3 FISH OIL PO) Take 1,200 mg by mouth daily        Potassium  "Chloride (KLOR-CON 10 PO) Take 1 tablet by mouth daily       spacer (OPTICHAMBER HARRIET) holding chamber Use with inhaler. 1 each 0            Allergies:     Allergies: Pseudoephedrine hcl, Simvastatin, and Sulfa drugs          Past Medical History:     Past Medical History:   Diagnosis Date     Aneurysm of thoracic aorta (H)      Diabetes (H)      External thrombosed hemorrhoids      Hiatal hernia      Hypertension      Need for prophylactic vaccination and inoculation against unspecified single bacterial disease      Other chest pain             Past Surgical History:     Past Surgical History:   Procedure Laterality Date     BACK SURGERY      laminectomy L5S1     COLONOSCOPY       COLONOSCOPY N/A 10/14/2016    Procedure: COLONOSCOPY;  Surgeon: Daljit Salazar MD;  Location: [facility]     L5 S1 Laminectomy  1991     ROTATOR CUFF REPAIR RT/LT  2006     SHOULDER SURGERY Right      TONSILLECTOMY  1971            Social History:     Social History     Tobacco Use     Smoking status: Former Smoker     Types: Cigarettes     Quit date: 1985     Years since quittin.9     Smokeless tobacco: Never Used   Substance Use Topics     Alcohol use: Yes     Alcohol/week: 0.0 standard drinks     Comment: 3 beers daily      Drug use: None            Review of Systems:     ROS: See HPI         Physical Exam:     /82 (Cuff Size: Adult Large)   Pulse 68   Temp 97.8  F (36.6  C) (Tympanic)   Ht 1.753 m (5' 9\")   Wt 99.8 kg (220 lb)   SpO2 96%   BMI 32.49 kg/m      General - The patient is well nourished and well developed, and appears to have good nutritional status.  Alert and oriented to person and place, answers questions and cooperates with examination appropriately.   Head and Face - Normocephalic and atraumatic, with no gross asymmetry noted.  The facial nerve is intact, with strong symmetric movements.  Voice and Breathing - The patient was breathing comfortably without the use of accessory " muscles. There was no wheezing, stridor. The patients voice was clear and strong, and had appropriate pitch and quality.  Ears - The ears were examined with binocular microscopy and with otoscope.  External ears normal. Right canal with ceruminosis.  Left canal cerumen impacted.  The right ear was cleaned with #7 sucker.   Right tympanic membrane is intact without effusion, retraction or mass. The left ear was cleaned with #7 sucker.  Left tympanic membrane is intact without effusion, retraction or mass.  Eyes - Extraocular movements intact, sclera were not icteric or injected, conjunctiva were pink and moist.  Mouth - Examination of the oral cavity showed pink, healthy oral mucosa. Dentition in good condition. No lesions or ulcerations noted. The tongue was mobile and midline.   Throat - The walls of the oropharynx were smooth, pink, moist, symmetric, and had no lesions or ulcerations.  The tonsillar pillars and soft palate were symmetric. The uvula was midline on elevation.    Neck - Full range of motion on passive movement.  Palpation of the occipital, submental, submandibular, internal jugular chain, and supraclavicular nodes did not demonstrate any abnormal lymph nodes or masses.  Palpation of the thyroid was soft and smooth, with no nodules or goiter appreciated.  The trachea was mobile and midline.  Nose - External contour is symmetric, no gross deflection or scars.  Nasal mucosa is pink and moist with no abnormal mucus.  The septum and turbinates were evaluated with nasal speculum, no polyps, masses, or purulence noted on examination.         Assessment and Plan:       ICD-10-CM    1. Ceruminosis, right  H61.21    2. Impacted cerumen of left ear  H61.22        Follow up in 6 months for repeat ear cleaning  Use debrox drops 5 drops in both ears twice daily for 3-4 days prior to ear cleaning      Ears were cleaned, tolerated well    The ears were cleaned today.  The patient may return here as needed or with  their primary physician.  Aural hygiene was discussed.  Avoidance of Q-tips was reiterated.  Avoid flushing the ear canal if there is a possibility of a hole or perforation in the tympanic membrane.   If there are any unresolved concerns regarding hearing loss an audiogram is recommended.      Shanice Robledo NPRaheelC  [facility] ENT

## 2020-12-22 NOTE — NURSING NOTE
"Chief Complaint   Patient presents with     Cerumen Impaction     Ear Cleaning       Initial /82 (Cuff Size: Adult Large)   Pulse 68   Temp 97.8  F (36.6  C) (Tympanic)   Ht 1.753 m (5' 9\")   Wt 99.8 kg (220 lb)   SpO2 96%   BMI 32.49 kg/m   Estimated body mass index is 32.49 kg/m  as calculated from the following:    Height as of this encounter: 1.753 m (5' 9\").    Weight as of this encounter: 99.8 kg (220 lb).  Medication Reconciliation: complete  Ethel Reyna LPN    " Normal rate, regular rhythm.  Heart sounds S1, S2.  No murmurs, rubs or gallops.

## 2020-12-22 NOTE — PATIENT INSTRUCTIONS
Thank you for allowing Shanice Robledo NP and our ENT team to participate in your care.  If your medications are too expensive, please give the nurse a call.  We can possibly change this medication.  If you have a scheduling or an appointment question please contact our Health Unit Coordinator at their direct line [phone].   ALL nursing questions or concerns can be directed to your ENT Nurse--[place]: [phone]    Follow up in 6 months for repeat ear cleaning  Use debrox drops 5 drops in both ears twice daily for 3-4 days prior to ear cleaning

## 2021-06-15 PROBLEM — K76.0 HEPATIC STEATOSIS: Status: ACTIVE | Noted: 2020-08-17

## 2021-06-15 PROBLEM — Z96.611 PRESENCE OF RIGHT ARTIFICIAL SHOULDER JOINT: Status: ACTIVE | Noted: 2021-06-15

## 2021-06-15 PROBLEM — Z47.89 ORTHOPEDIC AFTERCARE: Status: ACTIVE | Noted: 2019-04-18

## 2021-06-15 PROBLEM — M25.60 JOINT STIFFNESS: Status: ACTIVE | Noted: 2021-06-15

## 2021-06-15 PROBLEM — G89.29 CHRONIC LOW BACK PAIN: Status: ACTIVE | Noted: 2017-10-06

## 2021-06-15 PROBLEM — E11.69 HYPERLIPIDEMIA ASSOCIATED WITH TYPE 2 DIABETES MELLITUS (H): Status: ACTIVE | Noted: 2020-08-03

## 2021-06-15 PROBLEM — M25.519 SHOULDER PAIN: Status: ACTIVE | Noted: 2019-04-19

## 2021-06-15 PROBLEM — T84.9XXA DISORDER OF JOINT PROSTHESIS (H): Status: ACTIVE | Noted: 2019-02-28

## 2021-06-15 PROBLEM — Z95.828 PRESENCE OF OTHER VASCULAR IMPLANTS AND GRAFTS: Status: ACTIVE | Noted: 2017-01-26

## 2021-06-15 PROBLEM — M54.16 LUMBAR RADICULOPATHY: Status: ACTIVE | Noted: 2017-10-06

## 2021-06-15 PROBLEM — M62.81 MUSCLE WEAKNESS: Status: ACTIVE | Noted: 2017-10-06

## 2021-06-15 PROBLEM — I73.9 PAD (PERIPHERAL ARTERY DISEASE) (H): Status: ACTIVE | Noted: 2020-01-31

## 2021-06-15 PROBLEM — M54.50 CHRONIC LOW BACK PAIN: Status: ACTIVE | Noted: 2017-10-06

## 2021-06-15 PROBLEM — Z91.89 FRAMINGHAM CARDIAC RISK >20% IN NEXT 10 YEARS: Status: ACTIVE | Noted: 2019-08-01

## 2021-06-15 PROBLEM — Z95.828 HISTORY OF REPAIR OF ANEURYSM OF ABDOMINAL AORTA USING ENDOVASCULAR STENT GRAFT: Status: ACTIVE | Noted: 2017-01-26

## 2021-06-15 PROBLEM — E78.5 HYPERLIPIDEMIA ASSOCIATED WITH TYPE 2 DIABETES MELLITUS (H): Status: ACTIVE | Noted: 2020-08-03

## 2021-06-15 PROBLEM — Z91.89 OTHER SPECIFIED PERSONAL RISK FACTORS, NOT ELSEWHERE CLASSIFIED: Status: ACTIVE | Noted: 2018-12-05

## 2021-06-22 ENCOUNTER — OFFICE VISIT (OUTPATIENT)
Dept: OTOLARYNGOLOGY | Facility: OTHER | Age: 71
End: 2021-06-22
Attending: NURSE PRACTITIONER
Payer: MEDICARE

## 2021-06-22 VITALS
OXYGEN SATURATION: 97 % | WEIGHT: 220 LBS | HEIGHT: 69 IN | DIASTOLIC BLOOD PRESSURE: 74 MMHG | TEMPERATURE: 97.6 F | BODY MASS INDEX: 32.58 KG/M2 | SYSTOLIC BLOOD PRESSURE: 126 MMHG | HEART RATE: 63 BPM

## 2021-06-22 DIAGNOSIS — H61.23 BILATERAL IMPACTED CERUMEN: Primary | ICD-10-CM

## 2021-06-22 PROCEDURE — 69210 REMOVE IMPACTED EAR WAX UNI: CPT | Performed by: NURSE PRACTITIONER

## 2021-06-22 PROCEDURE — 92504 EAR MICROSCOPY EXAMINATION: CPT | Performed by: NURSE PRACTITIONER

## 2021-06-22 PROCEDURE — 99212 OFFICE O/P EST SF 10 MIN: CPT | Mod: 25 | Performed by: NURSE PRACTITIONER

## 2021-06-22 PROCEDURE — G0463 HOSPITAL OUTPT CLINIC VISIT: HCPCS

## 2021-06-22 ASSESSMENT — MIFFLIN-ST. JEOR: SCORE: 1748.29

## 2021-06-22 ASSESSMENT — PAIN SCALES - GENERAL: PAINLEVEL: NO PAIN (1)

## 2021-06-22 NOTE — LETTER
6/22/2021         RE: Scott Akhtar  [address] [facility] [address]        Dear Colleague,    Thank you for referring your patient, Scott Akhtar, to the [facility]. Please see a copy of my visit note below.    Otolaryngology Note         Chief Complaint:     Patient presents with:  Cerumen Impaction: Ear Cleaning            History of Present Illness:     Scott Akhtar is a 71 year old male who presents today for ear cleaning.  He has been feeling plugged for the past 3 months, left > right.     He last had ears cleaned 6 months ago.   No concerns with hearing.  + tinnitus  No vertigo, facial numbness or weakness.   He has some occasional light headedness when moving from siitting to standind  No current otalgia, otorrhea.    No hx of COM or otologic surgeries.          Medications:     Current Outpatient Rx   Medication Sig Dispense Refill     ALLOPURINOL PO Take 100 mg by mouth daily       ASPIRIN EC PO Take 81 mg by mouth daily       benzonatate (TESSALON) 100 MG capsule Take 1 capsule (100 mg) by mouth 3 times daily as needed for cough 1-2 tablets 3 times a day as needed 15 capsule 0     Cholecalciferol (VITAMIN D3 PO) Take 2,000 Units by mouth daily        HYDROCHLOROTHIAZIDE PO Take 25 mg by mouth daily        ibuprofen (ADVIL/MOTRIN) 800 MG tablet Take 800 mg by mouth every 8 hours as needed for moderate pain       loratadine (CLARITIN) 10 MG tablet Take 10 mg by mouth daily as needed        METFORMIN HCL PO Take 500 mg by mouth daily (with breakfast)       Multiple Minerals (CALCIUM-MAGNESIUM-ZINC) TABS Take 1 tablet by mouth daily       Omega-3 Fatty Acids (OMEGA-3 FISH OIL PO) Take 1,200 mg by mouth daily        Potassium Chloride (KLOR-CON 10 PO) Take 1 tablet by mouth daily       spacer (OPTICHAMBER HARRIET) holding chamber Use with inhaler. 1 each 0            Allergies:     Allergies: Pseudoephedrine hcl, Simvastatin, and Sulfa drugs          Past  "Medical History:     Past Medical History:   Diagnosis Date     Aneurysm of thoracic aorta (H)      Diabetes (H)      External thrombosed hemorrhoids      Hiatal hernia      Hypertension      Need for prophylactic vaccination and inoculation against unspecified single bacterial disease      Other chest pain             Past Surgical History:     Past Surgical History:   Procedure Laterality Date     BACK SURGERY      laminectomy L5S1     COLONOSCOPY       COLONOSCOPY N/A 10/14/2016    Procedure: COLONOSCOPY;  Surgeon: Daljit Salazar MD;  Location: [facility]     L5 S1 Laminectomy  1991     ROTATOR CUFF REPAIR RT/LT  2006     SHOULDER SURGERY Right      TONSILLECTOMY  1971       ENT family history reviewed         Social History:     Social History     Tobacco Use     Smoking status: Former Smoker     Types: Cigarettes     Quit date: 1985     Years since quittin.4     Smokeless tobacco: Never Used   Substance Use Topics     Alcohol use: Yes     Alcohol/week: 0.0 standard drinks     Comment: 3 beers daily      Drug use: None            Review of Systems:     ROS: See HPI         Physical Exam:     /74 (Cuff Size: Adult Large)   Pulse 63   Temp 97.6  F (36.4  C) (Tympanic)   Ht 1.753 m (5' 9\")   Wt 99.8 kg (220 lb)   SpO2 97%   BMI 32.49 kg/m      General - The patient is well nourished and well developed, and appears to have good nutritional status.  Alert and oriented to person and place, answers questions and cooperates with examination appropriately.   Head and Face - Normocephalic and atraumatic, with no gross asymmetry noted.  The facial nerve is intact, with strong symmetric movements.  Voice and Breathing - The patient was breathing comfortably without the use of accessory muscles. There was no wheezing, stridor. The patients voice was clear and strong, and had appropriate pitch and quality.  Ears - The ears were examined with binocular microscopy and with otoscope.  " External ears normal. Right canal has cerumen impaction.  Left canal with cerumen impaction.  The right ear was cleaned with #7, #5 sucker.   Right tympanic membrane is intact without effusion, retraction or mass. The left ear was cleaned with #7 sucker.  Left tympanic membrane is intact without effusion, retraction or mass.  Eyes - Extraocular movements intact, sclera were not icteric or injected, conjunctiva were pink and moist.  Mouth - Examination of the oral cavity showed pink, healthy oral mucosa. Dentition in good condition. No lesions or ulcerations noted. The tongue was mobile and midline.   Throat - The walls of the oropharynx were smooth, pink, moist, symmetric, and had no lesions or ulcerations.  The tonsillar pillars and soft palate were symmetric. The uvula was midline on elevation.    Neck - Full range of motion on passive movement.  Palpation of the occipital, submental, submandibular, internal jugular chain, and supraclavicular nodes did not demonstrate any abnormal lymph nodes or masses.  Palpation of the thyroid was soft and smooth, with no nodules or goiter appreciated.  The trachea was mobile and midline.  Nose - External contour is symmetric, no gross deflection or scars.  Nasal mucosa is pink and moist with no abnormal mucus.  The septum and turbinates were evaluated with nasal speculum, no polyps, masses, or purulence noted on examination.         Assessment and Plan:       ICD-10-CM    1. Bilateral impacted cerumen  H61.23        Ears were cleaned, tolerated well    The ears were cleaned today.  The patient may return here as needed or with their primary physician.  Aural hygiene was discussed.  Avoidance of Q-tips was reiterated.  Avoid flushing the ear canal if there is a possibility of a hole or perforation in the tympanic membrane.   If there are any unresolved concerns regarding hearing loss an audiogram is recommended.      Shanice COCHRAN  [facility]  ENT        Again, thank you for allowing me to participate in the care of your patient.        Sincerely,        Shanice Robledo NP

## 2021-06-22 NOTE — PROGRESS NOTES
Otolaryngology Note         Chief Complaint:     Patient presents with:  Cerumen Impaction: Ear Cleaning            History of Present Illness:     Scott Akhtar is a 71 year old male who presents today for ear cleaning.  He has been feeling plugged for the past 3 months, left > right.     He last had ears cleaned 6 months ago.   No concerns with hearing.  + tinnitus  No vertigo, facial numbness or weakness.   He has some occasional light headedness when moving from siitting to standind  No current otalgia, otorrhea.    No hx of COM or otologic surgeries.          Medications:     Current Outpatient Rx   Medication Sig Dispense Refill     ALLOPURINOL PO Take 100 mg by mouth daily       ASPIRIN EC PO Take 81 mg by mouth daily       benzonatate (TESSALON) 100 MG capsule Take 1 capsule (100 mg) by mouth 3 times daily as needed for cough 1-2 tablets 3 times a day as needed 15 capsule 0     Cholecalciferol (VITAMIN D3 PO) Take 2,000 Units by mouth daily        HYDROCHLOROTHIAZIDE PO Take 25 mg by mouth daily        ibuprofen (ADVIL/MOTRIN) 800 MG tablet Take 800 mg by mouth every 8 hours as needed for moderate pain       loratadine (CLARITIN) 10 MG tablet Take 10 mg by mouth daily as needed        METFORMIN HCL PO Take 500 mg by mouth daily (with breakfast)       Multiple Minerals (CALCIUM-MAGNESIUM-ZINC) TABS Take 1 tablet by mouth daily       Omega-3 Fatty Acids (OMEGA-3 FISH OIL PO) Take 1,200 mg by mouth daily        Potassium Chloride (KLOR-CON 10 PO) Take 1 tablet by mouth daily       spacer (OPTICHAMBER HARRIET) holding chamber Use with inhaler. 1 each 0            Allergies:     Allergies: Pseudoephedrine hcl, Simvastatin, and Sulfa drugs          Past Medical History:     Past Medical History:   Diagnosis Date     Aneurysm of thoracic aorta (H)      Diabetes (H)      External thrombosed hemorrhoids      Hiatal hernia      Hypertension      Need for prophylactic vaccination and inoculation against  "unspecified single bacterial disease      Other chest pain             Past Surgical History:     Past Surgical History:   Procedure Laterality Date     BACK SURGERY      laminectomy L5S1     COLONOSCOPY       COLONOSCOPY N/A 10/14/2016    Procedure: COLONOSCOPY;  Surgeon: Daljit Salazar MD;  Location: [facility]     L5 S1 Laminectomy  1991     ROTATOR CUFF REPAIR RT/LT  2006     SHOULDER SURGERY Right      TONSILLECTOMY  1971       ENT family history reviewed         Social History:     Social History     Tobacco Use     Smoking status: Former Smoker     Types: Cigarettes     Quit date: 1985     Years since quittin.4     Smokeless tobacco: Never Used   Substance Use Topics     Alcohol use: Yes     Alcohol/week: 0.0 standard drinks     Comment: 3 beers daily      Drug use: None            Review of Systems:     ROS: See HPI         Physical Exam:     /74 (Cuff Size: Adult Large)   Pulse 63   Temp 97.6  F (36.4  C) (Tympanic)   Ht 1.753 m (5' 9\")   Wt 99.8 kg (220 lb)   SpO2 97%   BMI 32.49 kg/m      General - The patient is well nourished and well developed, and appears to have good nutritional status.  Alert and oriented to person and place, answers questions and cooperates with examination appropriately.   Head and Face - Normocephalic and atraumatic, with no gross asymmetry noted.  The facial nerve is intact, with strong symmetric movements.  Voice and Breathing - The patient was breathing comfortably without the use of accessory muscles. There was no wheezing, stridor. The patients voice was clear and strong, and had appropriate pitch and quality.  Ears - The ears were examined with binocular microscopy and with otoscope.  External ears normal. Right canal has cerumen impaction.  Left canal with cerumen impaction.  The right ear was cleaned with #7, #5 sucker.   Right tympanic membrane is intact without effusion, retraction or mass. The left ear was cleaned with #7 sucker. "  Left tympanic membrane is intact without effusion, retraction or mass.  Eyes - Extraocular movements intact, sclera were not icteric or injected, conjunctiva were pink and moist.  Mouth - Examination of the oral cavity showed pink, healthy oral mucosa. Dentition in good condition. No lesions or ulcerations noted. The tongue was mobile and midline.   Throat - The walls of the oropharynx were smooth, pink, moist, symmetric, and had no lesions or ulcerations.  The tonsillar pillars and soft palate were symmetric. The uvula was midline on elevation.    Neck - Full range of motion on passive movement.  Palpation of the occipital, submental, submandibular, internal jugular chain, and supraclavicular nodes did not demonstrate any abnormal lymph nodes or masses.  Palpation of the thyroid was soft and smooth, with no nodules or goiter appreciated.  The trachea was mobile and midline.  Nose - External contour is symmetric, no gross deflection or scars.  Nasal mucosa is pink and moist with no abnormal mucus.  The septum and turbinates were evaluated with nasal speculum, no polyps, masses, or purulence noted on examination.         Assessment and Plan:       ICD-10-CM    1. Bilateral impacted cerumen  H61.23        Ears were cleaned, tolerated well    The ears were cleaned today.  The patient may return here as needed or with their primary physician.  Aural hygiene was discussed.  Avoidance of Q-tips was reiterated.  Avoid flushing the ear canal if there is a possibility of a hole or perforation in the tympanic membrane.   If there are any unresolved concerns regarding hearing loss an audiogram is recommended.      Shanice COCHRAN  [facility]

## 2021-06-22 NOTE — NURSING NOTE
"Chief Complaint   Patient presents with     Cerumen Impaction     Ear Cleaning        Initial /74 (Cuff Size: Adult Large)   Pulse 63   Temp 97.6  F (36.4  C) (Tympanic)   Ht 1.753 m (5' 9\")   Wt 99.8 kg (220 lb)   SpO2 97%   BMI 32.49 kg/m   Estimated body mass index is 32.49 kg/m  as calculated from the following:    Height as of this encounter: 1.753 m (5' 9\").    Weight as of this encounter: 99.8 kg (220 lb).  Medication Reconciliation: complete  Ethel Reyna LPN    "

## 2021-06-22 NOTE — PATIENT INSTRUCTIONS
Follow up as needed for ear cleaning      Thank you for allowing Shanice COCHRAN and our ENT team to participate in your care.  If your medications are too expensive, please call my nurse at the number listed below.  We can possibly change this medication.    If you have a scheduling or an appointment question please contact our Health Unit Coordinator at their direct line [phone] ext [phone]  ALL nursing questions or concerns can be directed to my Nurse Merlyn [phone].

## 2021-07-18 ENCOUNTER — HEALTH MAINTENANCE LETTER (OUTPATIENT)
Age: 71
End: 2021-07-18

## 2021-09-12 ENCOUNTER — HEALTH MAINTENANCE LETTER (OUTPATIENT)
Age: 71
End: 2021-09-12

## 2021-10-13 ENCOUNTER — HOSPITAL ENCOUNTER (EMERGENCY)
Facility: HOSPITAL | Age: 71
Discharge: HOME | End: 2021-10-13
Attending: NURSE PRACTITIONER | Admitting: NURSE PRACTITIONER
Payer: MEDICARE

## 2021-10-13 VITALS
SYSTOLIC BLOOD PRESSURE: 134 MMHG | TEMPERATURE: 97.2 F | RESPIRATION RATE: 16 BRPM | DIASTOLIC BLOOD PRESSURE: 86 MMHG | HEART RATE: 71 BPM | OXYGEN SATURATION: 95 %

## 2021-10-13 DIAGNOSIS — J06.9 VIRAL URI WITH COUGH: Primary | ICD-10-CM

## 2021-10-13 PROBLEM — T46.6X5S ADVERSE EFFECT OF ANTIHYPERLIPIDEMIC DRUG, SEQUELA: Status: ACTIVE | Noted: 2020-12-30

## 2021-10-13 LAB — GROUP A STREP BY PCR: NOT DETECTED

## 2021-10-13 PROCEDURE — 87651 STREP A DNA AMP PROBE: CPT | Performed by: NURSE PRACTITIONER

## 2021-10-13 PROCEDURE — G0463 HOSPITAL OUTPT CLINIC VISIT: HCPCS

## 2021-10-13 PROCEDURE — U0003 INFECTIOUS AGENT DETECTION BY NUCLEIC ACID (DNA OR RNA); SEVERE ACUTE RESPIRATORY SYNDROME CORONAVIRUS 2 (SARS-COV-2) (CORONAVIRUS DISEASE [COVID-19]), AMPLIFIED PROBE TECHNIQUE, MAKING USE OF HIGH THROUGHPUT TECHNOLOGIES AS DESCRIBED BY CMS-2020-01-R: HCPCS | Performed by: NURSE PRACTITIONER

## 2021-10-13 PROCEDURE — 99213 OFFICE O/P EST LOW 20 MIN: CPT | Performed by: NURSE PRACTITIONER

## 2021-10-13 PROCEDURE — C9803 HOPD COVID-19 SPEC COLLECT: HCPCS

## 2021-10-13 ASSESSMENT — ENCOUNTER SYMPTOMS
TROUBLE SWALLOWING: 0
HEADACHES: 1
SHORTNESS OF BREATH: 1
FATIGUE: 1
FEVER: 0
CHILLS: 0
RHINORRHEA: 1
EYE ITCHING: 0
DIARRHEA: 0
SINUS PAIN: 1
EYE REDNESS: 0
SORE THROAT: 1
SINUS PRESSURE: 1
VOMITING: 0
MYALGIAS: 0
NAUSEA: 0
PSYCHIATRIC NEGATIVE: 1
PALPITATIONS: 0
COUGH: 1
EYE PAIN: 1

## 2021-10-13 NOTE — ED PROVIDER NOTES
"  History     Chief Complaint   Patient presents with     Pharyngitis     HPI  Scott Akhtar is a 71 year old male who presents to urgent care today with complaints of fatigue, congestion, rhinorrhea, sinus pain and pressure, sore throat, eye \"burning,\" cough, SOB and headache which started Sunday and is progressively getting worse.  Denies current throat pain, states ibuprofen is effective.  Denies any fever, chills, nausea, vomiting, diarrhea and chest pain.  Patient had tonsillectomy in 1971.  Seasonal allergies and takes loratadine and Flonase as needed, has not been taking Flonase.  Staying hydrated, normal output.  Nonsmoker.  No history of Covid positive infection.  Moderna vaccine series completed 3/10/2021.  No other concerns.       Allergies:  Allergies   Allergen Reactions     Pseudoephedrine Hcl      Sudafed      Simvastatin GI Disturbance     Sulfa Drugs Nausea       Problem List:    Patient Active Problem List    Diagnosis Date Noted     Joint stiffness 06/15/2021     Priority: Medium     Presence of right artificial shoulder joint 06/15/2021     Priority: Medium     Adverse effect of antihyperlipidemic drug, sequela 12/30/2020     Priority: Medium     Hepatic steatosis 08/17/2020     Priority: Medium     Hyperlipidemia associated with type 2 diabetes mellitus (H) 08/03/2020     Priority: Medium     PAD (peripheral artery disease) (H) 01/31/2020     Priority: Medium     [place] cardiac risk >20% in next 10 years 08/01/2019     Priority: Medium     8/1/19:  Age 69; smoking: no; diabetes: yes; hypertension: yes; systolic blood pressure:  120; date of lipid:  8/1/19; HDL:  34; total cholesterol:  148; statin: no, is on ezetimibe (Zetia) 10mg; RISK:  30%.  Notes:  Intolerant of statins.  If he could tolerate a statin, risk drops to 23%.  High intensity statin:  18%.    9/21/18:  Age 68; smoking: no; diabetes: yes; hypertension: yes; systolic blood pressure:  104; date of lipid:  11/27/18; HDL:  28; " total cholesterol:  195; statin: no; RISK:  29%.  Notes:  Intol of statins.  If standard, 22%; if high intensity, 17%.  Start atorvastatin 10mg 12/12/18.    Formatting of this note might be different from the original.  8/1/19:  Age 69; smoking: no; diabetes: yes; hypertension: yes; systolic blood pressure:  120; date of lipid:  8/1/19; HDL:  34; total cholesterol:  148; statin: no, is on ezetimibe (Zetia) 10mg; RISK:  30%.  Notes:  Intolerant of statins.  If he could tolerate a statin, risk drops to 23%.  High intensity statin:  18%.    9/21/18:  Age 68; smoking: no; diabetes: yes; hypertension: yes; systolic blood pressure:  104; date of lipid:  11/27/18; HDL:  28; total cholesterol:  195; statin: no; RISK:  29%.  Notes:  Intol of statins.  If standard, 22%; if high intensity, 17%.  Start atorvastatin 10mg 12/12/18.       Shoulder pain 04/19/2019     Priority: Medium     Orthopedic aftercare 04/18/2019     Priority: Medium     Disorder of joint prosthesis (H) 02/28/2019     Priority: Medium     Overview:   Added automatically from request for surgery [id number]       Other specified personal risk factors, not elsewhere classified 12/05/2018     Priority: Medium     Overview:   Overview:   9/21/18:  Age 68; smoking: no; diabetes: yes; hypertension: yes; systolic blood pressure:  104; date of lipid:  11/27/18; HDL:  28; total cholesterol:  195; statin: no; RISK:  29%.  Notes:  Intol of statins.  If standard, 22%; if high intensity, 17%.  Start atorvastatin 10mg 12/12/18.       Chronic low back pain 10/06/2017     Priority: Medium     Lumbar radiculopathy 10/06/2017     Priority: Medium     Muscle weakness 10/06/2017     Priority: Medium     History of repair of aneurysm of abdominal aorta using endovascular stent graft 01/26/2017     Priority: Medium     Presence of other vascular implants and grafts 01/26/2017     Priority: Medium     Gout 08/04/2016     Priority: Medium     Obesity with body mass index 30 or  "greater 07/28/2016     Priority: Medium     Controlled type 2 diabetes mellitus without complication, without long-term current use of insulin (H) 07/27/2016     Priority: Medium     4/15/13:  A1c 6.5  10/13/2020- Diabetes without diabetic retinopathy of both eyes.  Overview:   Overview:   4/15/13:  A1c 6.5       Dermatitis herpetiformis 07/27/2016     Priority: Medium     Overview:   Diagnosed while in the .  Started after visiting [facility] in 1971.  Treats with dapsone four times a week; when he cuts down on the dose he gets a breakout of little water blisters on the backs of his hands, elbows, and knees.  Overview:   Overview:   Diagnosed while in the .  Started after visiting [facility] in 1971.  Treats with dapsone four times a week; when he cuts down on the dose he gets a breakout of little water blisters on the backs of his hands, elbows, and knees.       Essential hypertension 07/27/2016     Priority: Medium     Degenerative scoliosis in adult patient 07/27/2016     Priority: Medium     Sensorineural hearing loss (SNHL) 06/26/2013     Priority: Medium     Other specified counseling 04/11/2013     Priority: Medium     Diaphragmatic hernia without obstruction or gangrene 12/19/2012     Priority: Medium     Overview:   Overview:   12/19/12, \"small esophageal hiatal hernia\" noted on CT abdomen/pelvis.       Gastroesophageal reflux disease without esophagitis 01/13/2005     Priority: Medium        Past Medical History:    Past Medical History:   Diagnosis Date     Aneurysm of thoracic aorta (H)      Diabetes (H)      External thrombosed hemorrhoids      Hiatal hernia      Hypertension      Need for prophylactic vaccination and inoculation against unspecified single bacterial disease      Other chest pain        Past Surgical History:    Past Surgical History:   Procedure Laterality Date     BACK SURGERY      laminectomy L5S1     COLONOSCOPY       COLONOSCOPY N/A 10/14/2016    Procedure: COLONOSCOPY;  " Surgeon: Daljit Salazar MD;  Location: [facility]     L5 S1 Laminectomy  1991     ROTATOR CUFF REPAIR RT/LT  2006     SHOULDER SURGERY Right      TONSILLECTOMY  1971       Family History:    Family History   Problem Relation Age of Onset     Diabetes Father        Social History:  Marital Status:   [2]  Social History     Tobacco Use     Smoking status: Former Smoker     Types: Cigarettes     Quit date: 1985     Years since quittin.8     Smokeless tobacco: Never Used   Substance Use Topics     Alcohol use: Yes     Alcohol/week: 0.0 standard drinks     Comment: 3 beers daily      Drug use: Not on file        Medications:    ALLOPURINOL PO  ASPIRIN EC PO  benzonatate (TESSALON) 100 MG capsule  Cholecalciferol (VITAMIN D3 PO)  HYDROCHLOROTHIAZIDE PO  ibuprofen (ADVIL/MOTRIN) 800 MG tablet  loratadine (CLARITIN) 10 MG tablet  METFORMIN HCL PO  Multiple Minerals (CALCIUM-MAGNESIUM-ZINC) TABS  Omega-3 Fatty Acids (OMEGA-3 FISH OIL PO)  Potassium Chloride (KLOR-CON 10 PO)  spacer (OPTICHAMBER HARRIET) holding chamber      Review of Systems   Constitutional: Positive for fatigue. Negative for chills and fever.   HENT: Positive for congestion, rhinorrhea, sinus pressure, sinus pain and sore throat. Negative for ear pain and trouble swallowing.    Eyes: Positive for pain (burning). Negative for redness and itching.   Respiratory: Positive for cough and shortness of breath.    Cardiovascular: Negative for chest pain and palpitations.   Gastrointestinal: Negative for diarrhea, nausea and vomiting.   Musculoskeletal: Negative for myalgias.   Skin: Negative for rash.   Neurological: Positive for headaches.   Psychiatric/Behavioral: Negative.      Physical Exam   BP: 134/86  Pulse: 71  Temp: 97.2  F (36.2  C)  Resp: 16  SpO2: 95 %    Physical Exam  Vitals and nursing note reviewed.   Constitutional:       General: He is not in acute distress.     Appearance: He is not ill-appearing or  toxic-appearing.   HENT:      Head: Normocephalic.      Right Ear: Tympanic membrane, ear canal and external ear normal.      Left Ear: Tympanic membrane, ear canal and external ear normal.      Nose: Congestion and rhinorrhea present.      Mouth/Throat:      Mouth: Mucous membranes are moist.      Pharynx: Oropharynx is clear. No oropharyngeal exudate or posterior oropharyngeal erythema.   Eyes:      Extraocular Movements: Extraocular movements intact.      Conjunctiva/sclera: Conjunctivae normal.      Pupils: Pupils are equal, round, and reactive to light.   Cardiovascular:      Rate and Rhythm: Normal rate and regular rhythm.      Pulses: Normal pulses.      Heart sounds: Normal heart sounds.   Pulmonary:      Effort: Pulmonary effort is normal.      Breath sounds: Normal breath sounds.   Abdominal:      General: Bowel sounds are normal.      Palpations: Abdomen is soft.      Tenderness: There is no abdominal tenderness.   Musculoskeletal:      Cervical back: Normal range of motion and neck supple.   Neurological:      Mental Status: He is alert.   Psychiatric:         Mood and Affect: Mood normal.       ED Course     Results for orders placed or performed during the hospital encounter of 10/13/21 (from the past 24 hour(s))   Group A Streptococcus PCR Throat Swab    Specimen: Throat; Swab   Result Value Ref Range    Group A strep by PCR Not Detected Not Detected    Narrative    The Xpert Xpress Strep A test, performed on the [employer]  Instrument Systems, is a rapid, qualitative in vitro diagnostic test for the detection of Streptococcus pyogenes (Group A ß-hemolytic Streptococcus, Strep A) in throat swab specimens from patients with signs and symptoms of pharyngitis. The Xpert Xpress Strep A test can be used as an aid in the diagnosis of Group A Streptococcal pharyngitis. The assay is not intended to monitor treatment for Group A Streptococcus infections. The Xpert Xpress Strep A test utilizes an automated  real-time polymerase chain reaction (PCR) to detect Streptococcus pyogenes DNA.       Medications - No data to display    Assessments & Plan (with Medical Decision Making)     I have reviewed the nursing notes.    I have reviewed the findings, diagnosis, plan and need for follow up with the patient.  (J06.9) Viral URI with cough  (primary encounter diagnosis)  Plan: COVID-19 GetWell Loop Referral  Strep negative.  Lungs clear throughout.  Denies any fever or chills.  Staying hydrated.  No signs of bacterial infection.  Denies current pain.  Patient states ibuprofen is effective for sore throat.  Covid test pending.  Patient to follow-up with primary care provider or return to urgent care-ED with any worsening in condition or additional concerns.  Patient in agreement with treatment plan.    Patient education  Symptomatic treatments recommended.  -Discussed that antibiotics would not help symptoms of viral URI. Education provided on symptoms of secondary bacterial infection such as new fever, chills, rigors, shortness of breath, increased work of breathing, that can occur with viral URI and need for further evaluation, if they occur.   - Ensure you are staying hydrated by drinking plenty of fluids or eating foods such as popsicles, jello, pudding.  - Honey can be soothing for sore throat  - Warm salt water gurgles can help soothe sore throat  - Rest  - Humidifier can help with congestion and help keep mucus membranes such as throat and nose from drying out.  - Sleeping slightly propped up can help with congestion and postnasal drainage that can worsen cough at bedtime.  - As long as you have never been told to take Tylenol and/or Ibuprofen you can use them to manage fever and body aches per package instructions  Make sure you eat when you take ibuprofen to avoid stomach upset.  - OTC cough medications per package instructions to help with cough. Check to see if the cough/cold medication already has acetaminophen  (Tylenol) in it. If it does avoid taking additional Tylenol.  - If sudden onset of new fever, worsening symptoms return for further evaluation.  - OTC nasal steroid such as Flonase can help decrease sinus inflammation to help with congestion.  - Education provided on symptoms of post-viral bacterial infections including ear infection and pneumonia. This would require re-evaluation for treatment.    Follow-up with primary care provider or return to urgent care-ED with any worsening in condition or additional concerns.    New Prescriptions    No medications on file     Final diagnoses:   Viral URI with cough     10/13/2021   [facility]     Kayley Carpio, THOMAS  10/13/21 [id number]

## 2021-10-13 NOTE — ED TRIAGE NOTES
Pt presents with c/o sore throat and swollen lymph nodes making it hard to sleep and swallow. Sx started last Sunday night and states it has been getting worse. Pt states he has been gargling salt water and Listerine (seperately), and ibuprofen. Pt is covid vaccinated. Pt reports he has not been exposed to anyone with covid that he is aware of. Pt will do covid test if provider wants one but would rather not.

## 2021-10-14 LAB — SARS-COV-2 RNA RESP QL NAA+PROBE: NEGATIVE

## 2021-10-19 ENCOUNTER — OFFICE VISIT (OUTPATIENT)
Dept: OTOLARYNGOLOGY | Facility: OTHER | Age: 71
End: 2021-10-19
Attending: NURSE PRACTITIONER
Payer: MEDICARE

## 2021-10-19 VITALS
TEMPERATURE: 97 F | DIASTOLIC BLOOD PRESSURE: 76 MMHG | SYSTOLIC BLOOD PRESSURE: 132 MMHG | OXYGEN SATURATION: 95 % | HEIGHT: 68 IN | WEIGHT: 220 LBS | BODY MASS INDEX: 33.34 KG/M2 | HEART RATE: 63 BPM

## 2021-10-19 DIAGNOSIS — H61.23 BILATERAL IMPACTED CERUMEN: Primary | ICD-10-CM

## 2021-10-19 PROCEDURE — 69210 REMOVE IMPACTED EAR WAX UNI: CPT | Performed by: NURSE PRACTITIONER

## 2021-10-19 PROCEDURE — 69210 REMOVE IMPACTED EAR WAX UNI: CPT | Mod: 50 | Performed by: NURSE PRACTITIONER

## 2021-10-19 PROCEDURE — G0463 HOSPITAL OUTPT CLINIC VISIT: HCPCS

## 2021-10-19 ASSESSMENT — PAIN SCALES - GENERAL: PAINLEVEL: MODERATE PAIN (4)

## 2021-10-19 ASSESSMENT — MIFFLIN-ST. JEOR: SCORE: 1732.41

## 2021-10-19 NOTE — LETTER
10/19/2021         RE: Scott Akhtar  [address] [facility] [id number]        Dear Colleague,    Thank you for referring your patient, Scott Akhtar, to the [facility]. Please see a copy of my visit note below.    Otolaryngology Note         Chief Complaint:     Patient presents with:  Cerumen Impaction: Ear cleaning            History of Present Illness:     Scott Akhtar is a 71 year old male who presents today for ear cleaning.  He has been feeling a tender area in the superior pinna of the left ear.  It hurts off and on, mild.     He last had ears cleaned 6 months ago.   No concerns with hearing.  + tinnitus  No vertigo, facial numbness or weakness.   He has some occasional light headedness when moving from siitting to standind  No current otalgia, otorrhea.    No hx of COM or otologic surgeries.     [facility], no [facility]ogram on file at this time.          Medications:     Current Outpatient Rx   Medication Sig Dispense Refill     ALLOPURINOL PO Take 100 mg by mouth daily       ASPIRIN EC PO Take 81 mg by mouth daily       Cholecalciferol (VITAMIN D3 PO) Take 2,000 Units by mouth daily        HYDROCHLOROTHIAZIDE PO Take 25 mg by mouth daily        ibuprofen (ADVIL/MOTRIN) 800 MG tablet Take 800 mg by mouth every 8 hours as needed for moderate pain       loratadine (CLARITIN) 10 MG tablet Take 10 mg by mouth daily as needed        METFORMIN HCL PO Take 500 mg by mouth daily (with breakfast)       Multiple Minerals (CALCIUM-MAGNESIUM-ZINC) TABS Take 1 tablet by mouth daily       Omega-3 Fatty Acids (OMEGA-3 FISH OIL PO) Take 1,200 mg by mouth daily        Potassium Chloride (KLOR-CON 10 PO) Take 1 tablet by mouth daily       spacer (OPTICHAMBER HARRIET) holding chamber Use with inhaler. 1 each 0            Allergies:     Allergies: Pseudoephedrine hcl, Simvastatin, Sulfa drugs, and Chinese ink          Past Medical History:     Past Medical  "History:   Diagnosis Date     Aneurysm of thoracic aorta (H)      Diabetes (H)      External thrombosed hemorrhoids      Hiatal hernia      Hypertension      Need for prophylactic vaccination and inoculation against unspecified single bacterial disease      Other chest pain             Past Surgical History:     Past Surgical History:   Procedure Laterality Date     BACK SURGERY      laminectomy L5S1     COLONOSCOPY       COLONOSCOPY N/A 10/14/2016    Procedure: COLONOSCOPY;  Surgeon: Daljit Salazar MD;  Location: [facility]     L5 S1 Laminectomy  1991     ROTATOR CUFF REPAIR RT/LT  2006     SHOULDER SURGERY Right      TONSILLECTOMY  1971       ENT family history reviewed         Social History:     Social History     Tobacco Use     Smoking status: Former Smoker     Types: Cigarettes     Quit date: 1985     Years since quittin.8     Smokeless tobacco: Never Used   Substance Use Topics     Alcohol use: Yes     Alcohol/week: 0.0 standard drinks     Comment: 3 beers daily      Drug use: None            Review of Systems:     ROS: See HPI         Physical Exam:     /76 (Cuff Size: Adult Regular)   Pulse 63   Temp 97  F (36.1  C) (Tympanic)   Ht 1.727 m (5' 8\")   Wt 99.8 kg (220 lb)   SpO2 95%   BMI 33.45 kg/m      General - The patient is well nourished and well developed, and appears to have good nutritional status.  Alert and oriented to person and place, answers questions and cooperates with examination appropriately.   Head and Face - Normocephalic and atraumatic, with no gross asymmetry noted.  The facial nerve is intact, with strong symmetric movements.  Voice and Breathing - The patient was breathing comfortably without the use of accessory muscles. There was no wheezing, stridor. The patients voice was clear and strong, and had appropriate pitch and quality.  Ears - The ears were examined with binocular microscopy and with otoscope.  External ears normal.  No sores or " tenderness to the left pinna.  Right canal has cerumen impaction.  Left canal with cerumen impaction.  The right ear was cleaned with #7, #5 sucker.   Right tympanic membrane is intact without effusion, retraction or mass. The left ear was cleaned with #7 sucker.  Left tympanic membrane is intact without effusion, retraction or mass.  Eyes - Extraocular movements intact, sclera were not icteric or injected, conjunctiva were pink and moist.  Mouth - Examination of the oral cavity showed pink, healthy oral mucosa. Dentition in good condition. No lesions or ulcerations noted. The tongue was mobile and midline.   Throat - The walls of the oropharynx were smooth, pink, moist, symmetric, and had no lesions or ulcerations.  The tonsillar pillars and soft palate were symmetric. The uvula was midline on elevation.    Neck - Full range of motion on passive movement.  Palpation of the occipital, submental, submandibular, internal jugular chain, and supraclavicular nodes did not demonstrate any abnormal lymph nodes or masses.  Palpation of the thyroid was soft and smooth, with no nodules or goiter appreciated.  The trachea was mobile and midline.  Nose - External contour is symmetric, no gross deflection or scars.  Nasal mucosa is pink and moist with no abnormal mucus.  The septum and turbinates were evaluated with nasal speculum, no polyps, masses, or purulence noted on examination.         Assessment and Plan:       ICD-10-CM    1. Bilateral impacted cerumen  H61.23        Ears were cleaned, tolerated well    The ears were cleaned today.  The patient may return here as needed or with their primary physician.  Aural hygiene was discussed.  Avoidance of Q-tips was reiterated.  Avoid flushing the ear canal if there is a possibility of a hole or perforation in the tympanic membrane.   If there are any unresolved concerns regarding hearing loss an audiogram is recommended.      Shanice COCHRAN  [facility]  ENT        Again, thank you for allowing me to participate in the care of your patient.        Sincerely,        Shanice Robledo NP

## 2021-10-19 NOTE — PROGRESS NOTES
Otolaryngology Note         Chief Complaint:     Patient presents with:  Cerumen Impaction: Ear cleaning            History of Present Illness:     Scott Akhtar is a 71 year old male who presents today for ear cleaning.  He has been feeling a tender area in the superior pinna of the left ear.  It hurts off and on, mild.     He last had ears cleaned 6 months ago.   No concerns with hearing.  + tinnitus  No vertigo, facial numbness or weakness.   He has some occasional light headedness when moving from siitting to standind  No current otalgia, otorrhea.    No hx of COM or otologic surgeries.     [facility], no audiogram on file at this time.          Medications:     Current Outpatient Rx   Medication Sig Dispense Refill     ALLOPURINOL PO Take 100 mg by mouth daily       ASPIRIN EC PO Take 81 mg by mouth daily       Cholecalciferol (VITAMIN D3 PO) Take 2,000 Units by mouth daily        HYDROCHLOROTHIAZIDE PO Take 25 mg by mouth daily        ibuprofen (ADVIL/MOTRIN) 800 MG tablet Take 800 mg by mouth every 8 hours as needed for moderate pain       loratadine (CLARITIN) 10 MG tablet Take 10 mg by mouth daily as needed        METFORMIN HCL PO Take 500 mg by mouth daily (with breakfast)       Multiple Minerals (CALCIUM-MAGNESIUM-ZINC) TABS Take 1 tablet by mouth daily       Omega-3 Fatty Acids (OMEGA-3 FISH OIL PO) Take 1,200 mg by mouth daily        Potassium Chloride (KLOR-CON 10 PO) Take 1 tablet by mouth daily       spacer (OPTICHAMBER HARRIET) holding chamber Use with inhaler. 1 each 0            Allergies:     Allergies: Pseudoephedrine hcl, Simvastatin, Sulfa drugs, and Chinese ink          Past Medical History:     Past Medical History:   Diagnosis Date     Aneurysm of thoracic aorta (H)      Diabetes (H)      External thrombosed hemorrhoids      Hiatal hernia      Hypertension      Need for prophylactic vaccination and inoculation against unspecified single bacterial disease       "Other chest pain             Past Surgical History:     Past Surgical History:   Procedure Laterality Date     BACK SURGERY      laminectomy L5S1     COLONOSCOPY       COLONOSCOPY N/A 10/14/2016    Procedure: COLONOSCOPY;  Surgeon: Daljit Salazar MD;  Location: [facility]     L5 S1 Laminectomy  1991     ROTATOR CUFF REPAIR RT/LT  2006     SHOULDER SURGERY Right      TONSILLECTOMY  1971       ENT family history reviewed         Social History:     Social History     Tobacco Use     Smoking status: Former Smoker     Types: Cigarettes     Quit date: 1985     Years since quittin.8     Smokeless tobacco: Never Used   Substance Use Topics     Alcohol use: Yes     Alcohol/week: 0.0 standard drinks     Comment: 3 beers daily      Drug use: None            Review of Systems:     ROS: See HPI         Physical Exam:     /76 (Cuff Size: Adult Regular)   Pulse 63   Temp 97  F (36.1  C) (Tympanic)   Ht 1.727 m (5' 8\")   Wt 99.8 kg (220 lb)   SpO2 95%   BMI 33.45 kg/m      General - The patient is well nourished and well developed, and appears to have good nutritional status.  Alert and oriented to person and place, answers questions and cooperates with examination appropriately.   Head and Face - Normocephalic and atraumatic, with no gross asymmetry noted.  The facial nerve is intact, with strong symmetric movements.  Voice and Breathing - The patient was breathing comfortably without the use of accessory muscles. There was no wheezing, stridor. The patients voice was clear and strong, and had appropriate pitch and quality.  Ears - The ears were examined with binocular microscopy and with otoscope.  External ears normal.  No sores or tenderness to the left pinna.  Right canal has cerumen impaction.  Left canal with cerumen impaction.  The right ear was cleaned with #7, #5 sucker.   Right tympanic membrane is intact without effusion, retraction or mass. The left ear was cleaned with #7 " sucker.  Left tympanic membrane is intact without effusion, retraction or mass.  Eyes - Extraocular movements intact, sclera were not icteric or injected, conjunctiva were pink and moist.  Mouth - Examination of the oral cavity showed pink, healthy oral mucosa. Dentition in good condition. No lesions or ulcerations noted. The tongue was mobile and midline.   Throat - The walls of the oropharynx were smooth, pink, moist, symmetric, and had no lesions or ulcerations.  The tonsillar pillars and soft palate were symmetric. The uvula was midline on elevation.    Neck - Full range of motion on passive movement.  Palpation of the occipital, submental, submandibular, internal jugular chain, and supraclavicular nodes did not demonstrate any abnormal lymph nodes or masses.  Palpation of the thyroid was soft and smooth, with no nodules or goiter appreciated.  The trachea was mobile and midline.  Nose - External contour is symmetric, no gross deflection or scars.  Nasal mucosa is pink and moist with no abnormal mucus.  The septum and turbinates were evaluated with nasal speculum, no polyps, masses, or purulence noted on examination.         Assessment and Plan:       ICD-10-CM    1. Bilateral impacted cerumen  H61.23        Ears were cleaned, tolerated well    The ears were cleaned today.  The patient may return here as needed or with their primary physician.  Aural hygiene was discussed.  Avoidance of Q-tips was reiterated.  Avoid flushing the ear canal if there is a possibility of a hole or perforation in the tympanic membrane.   If there are any unresolved concerns regarding hearing loss an audiogram is recommended.      Shanice COCHRAN  [facility] ENT

## 2021-10-19 NOTE — PATIENT INSTRUCTIONS
Follow up in 6 months for repeat cleaning and ear check, sooner as needed with new concerns      Thank you for allowing Shanice COCHRAN and our ENT team to participate in your care.  If your medications are too expensive, please call my nurse at the number listed below.  We can possibly change this medication.    If you have a scheduling or an appointment question please contact our Health Unit Coordinator at their direct line [phone] ext [phone]  ALL nursing questions or concerns can be directed to my Nurse Merlyn [phone].

## 2021-10-19 NOTE — NURSING NOTE
"Chief Complaint   Patient presents with     Cerumen Impaction     Ear cleaning        Initial /76 (Cuff Size: Adult Regular)   Pulse 63   Temp 97  F (36.1  C) (Tympanic)   Ht 1.727 m (5' 8\")   Wt 99.8 kg (220 lb)   SpO2 95%   BMI 33.45 kg/m   Estimated body mass index is 33.45 kg/m  as calculated from the following:    Height as of this encounter: 1.727 m (5' 8\").    Weight as of this encounter: 99.8 kg (220 lb).  Medication Reconciliation: complete  Ethel Reyna LPN    "

## 2021-10-25 ENCOUNTER — TRANSFERRED RECORDS (OUTPATIENT)
Dept: MULTI SPECIALTY CLINIC | Facility: CLINIC | Age: 71
End: 2021-10-25
Payer: COMMERCIAL

## 2021-10-25 LAB — RETINOPATHY: NORMAL

## 2021-11-07 ENCOUNTER — HEALTH MAINTENANCE LETTER (OUTPATIENT)
Age: 71
End: 2021-11-07

## 2021-11-30 PROBLEM — Z91.89 OTHER SPECIFIED PERSONAL RISK FACTORS, NOT ELSEWHERE CLASSIFIED: Status: RESOLVED | Noted: 2018-12-05 | Resolved: 2021-11-30

## 2021-11-30 PROBLEM — L93.2 CUTANEOUS LUPUS ERYTHEMATOSUS: Status: ACTIVE | Noted: 2021-11-30

## 2021-11-30 PROBLEM — Z47.89 ORTHOPEDIC AFTERCARE: Status: RESOLVED | Noted: 2019-04-18 | Resolved: 2021-11-30

## 2021-11-30 PROBLEM — Z91.89 FRAMINGHAM CARDIAC RISK >20% IN NEXT 10 YEARS: Status: RESOLVED | Noted: 2019-08-01 | Resolved: 2021-11-30

## 2021-11-30 PROBLEM — M54.50 CHRONIC LOW BACK PAIN: Status: RESOLVED | Noted: 2017-10-06 | Resolved: 2021-11-30

## 2021-11-30 PROBLEM — M25.519 SHOULDER PAIN: Status: RESOLVED | Noted: 2019-04-19 | Resolved: 2021-11-30

## 2021-11-30 PROBLEM — G89.29 CHRONIC LOW BACK PAIN: Status: RESOLVED | Noted: 2017-10-06 | Resolved: 2021-11-30

## 2021-11-30 NOTE — PROGRESS NOTES
Assessment & Plan     Encounter to establish care  Patient is in need of a new provider. he has been explained the role of a CNP and the fact that I do not follow patients in the hospital. he was told that should he get admitted, he would then be followed by a hospitalist. he verbalizes understanding and would like to establish a relationship now.     Controlled type 2 diabetes mellitus without complication, without long-term current use of insulin (H)  A1C 6.6. Well controlled. Will continue metformin. On asa. On statin. BP at goal. Eye exam UTD. See me 6 months.     - Hemoglobin A1c  - Basic metabolic panel  - Albumin Random Urine Quantitative with Creat Ratio    Hyperlipidemia associated with type 2 diabetes mellitus (H)  The 10-year ASCVD risk score (Eloy BEASLEY Jr., et al., 2013) is: 30.1%    Values used to calculate the score:      Age: 71 years      Sex: Male      Is Non- : No      Diabetic: Yes      Tobacco smoker: No      Systolic Blood Pressure: 120 mmHg      Is BP treated: No      HDL Cholesterol: 36 mg/dL      Total Cholesterol: 162 mg/dL     Risk high. On once weekly pravastatin and Zetia. Has not tolerated taking the statin daily. He will try taking the statin every other day and will reassess lipids in 6 months.     - Lipid Profile (Chol, Trig, HDL, LDL calc)  - pravastatin (PRAVACHOL) 10 MG tablet; 1 tablet 2 times weekly.  - Hepatic panel (Albumin, ALT, AST, Bili, Alk Phos, TP)    Essential hypertension  Well controlled. Continue current medications. Encouraged daily exercise and a low sodium diet. Recommended checking BP's 2x/wk, call the clinic if consistantly s>140 or d>90. Follow up in 6 months.     History of repair of aneurysm of abdominal aorta using endovascular stent graft  Doing well. Will continue f/up with vascular.     Idiopathic gout, unspecified chronicity, unspecified site  Controlled with allopurinol. Will continue.     Cutaneous lupus erythematosus  Inactive.  "Will return with new symptoms.     Trigger finger, acquired  Left middle finger triggering. Ortho referral placed.     - Orthopedic  Referral; Future    Hepatic steatosis  Diet and exercise encouraged. LFTs normal today. Will recheck periodically.     Dermatitis herpetiformis  Inactive. Will return with new symptoms.       Review of external notes as documented elsewhere in note  Ordering of each unique test  Prescription drug management  44 minutes spent on the date of the encounter doing chart review, review of outside records, review of test results, interpretation of tests, patient visit and documentation        BMI:   Estimated body mass index is 34.73 kg/m  as calculated from the following:    Height as of this encounter: 1.753 m (5' 9\").    Weight as of this encounter: 106.7 kg (235 lb 3.2 oz).   Weight management plan: Discussed healthy diet and exercise guidelines      Return in about 6 months (around 6/1/2022).    Usha Gray NP  [facility] Chavez is a 71 year old who presents for the following health issues    HPI     NEW PATIENT  At this time, past medical history, current medications, allergies and drug sensitivities, immunizations, habits and life style, family history, and social history are reviewed and updated.    Diabetes Type 2 Follow-up    How often are you checking your blood sugar? A few times a month  What time of day are you checking your blood sugars (select all that apply)?  Before meals  Have you had any blood sugars above 200?  No  Have you had any blood sugars below 70?  No    What symptoms do you notice when your blood sugar is low?  None    What concerns do you have today about your diabetes? None     Do you have any of these symptoms? (Select all that apply)  No numbness or tingling in feet.  No redness, sores or blisters on feet.  No complaints of excessive thirst.  No reports of blurry vision.  No significant changes to " weight.    Have you had a diabetic eye exam in the last 12 months? Yes- Date of last eye exam: 10- ,  Location: [facility]      Taking Metformin 500 mg b.i.d. without side effects. Did have nausea with higher doses of Metformin.     Denies chest pain, shortness of breath, dizziness, syncope, or palpitations. No nausea or vomiting.     On asa 81 mg.     Diabetic Foot Screen:  Any complaints of increased pain or numbness ? No  Is there a foot ulcer now or a history of foot ulcer? No  Does the foot have an abnormal shape? No  Are the nails thick, too long or ingrown? No  Are there any redness or open areas? No         Sensation Testing done at all points on the diagram with monofilament     Right Foot: Sensation Normal at all points  Left Foot: Sensation Normal at all points     Risk Category: 0- No loss of protective sensation  Performed by Usha Gray NP      Hyperlipidemia Follow-Up      Are you regularly taking any medication or supplement to lower your cholesterol?   Yes; takes Pravastatin once weekly with Zetia, does not tolerate taking the statin daily. Developed stomach aches with Crestor, simvastatin, and atorvastatin.     Are you having muscle aches or other side effects that you think could be caused by your cholesterol lowering medication?  No   Denies chest pain, shortness of breath, dizziness, syncope, or palpitations.     He does have PAD and as had an aortic aneurysm repair. Follows with Dr. Kern. Dr. Seay repaired in 2018.     Hypertension Follow-up      Do you check your blood pressure regularly outside of the clinic? No     Are you following a low salt diet? No    Are your blood pressures ever more than 140 on the top number (systolic) OR more   than 90 on the bottom number (diastolic), for example 140/90? No   Taking hydrochlorothiazide 25 mg daily without side effects. Also on potassium.   As noted above, she denies chest pain, shortness of breath, dizziness, syncope, or  "palpitations    BP Readings from Last 2 Encounters:   12/01/21 120/84   10/19/21 132/76     Hemoglobin A1C (%)   Date Value   12/01/2021 6.6 (H)     LDL Cholesterol Calculated (mg/dL)   Date Value   12/01/2021 91        He does have a h/o gout, taking allopurinal 200 mg daily without side effects. No recent flares.     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 Was last seen in 8/2020 by rheumatology. Note was reviewed. Was referred to derm at this time as he also had severe testicular itching. Last saw derm in 10/2020. Note was reviewed. Diagnosed with dermatitis herpetiformis and tinea. He notes that currently his dermatitis herpetiformis and cutaneous lupus are inactive. No current rashes. Still has scrotal itching. Uses lotrimin.     He was noted to have fatty liver disease in 2018. No nausea, vomiting, or abdominal pain.     His left middle finger also locks. Worried he has arthritis. Symptoms worse in the morning. No hand erythema or swelling.       How many servings of fruits and vegetables do you eat daily?  0-1    On average, how many sweetened beverages do you drink each day (Examples: soda, juice, sweet tea, etc.  Do NOT count diet or artificially sweetened beverages)?   1 chi latte in the morning .     How many days per week do you exercise enough to make your heart beat faster? 3 or less 4-5 walks a month     How many minutes a day do you exercise enough to make your heart beat faster? 20 - 29    How many days per week do you miss taking your medication? 0      Review of Systems   As noted in the HPI.       Objective    /84 (BP Location: Left arm, Patient Position: Chair, Cuff Size: Adult Large)   Pulse 69   Temp 97.5  F (36.4  C) (Tympanic)   Ht 1.753 m (5' 9\")   Wt 106.7 kg (235 lb 3.2 oz)   SpO2 95%   BMI 34.73 kg/m    Body mass index is 34.73 kg/m .  Physical Exam   GENERAL: alert, no distress and obese  EYES: Eyes grossly normal to inspection, PERRL and " conjunctivae and sclerae normal  HENT: ear canals and TM's normal, nose and mouth without ulcers or lesions  NECK: no adenopathy, no asymmetry, masses, or scars and thyroid normal to palpation  RESP: lungs clear to auscultation - no rales, rhonchi or wheezes  CV: regular rate and rhythm, no murmur, click or rub, no peripheral edema  ABDOMEN: soft, nontender, obese, no masses and bowel sounds normal  MS: left middle finger locking-triggering, no swelling or erythema  NEURO: Normal strength and tone, mentation intact and speech normal  PSYCH: mentation appears normal, affect normal/bright  Diabetic foot exam: normal DP and PT pulses, no trophic changes or ulcerative lesions and normal sensory exam    Results for orders placed or performed in visit on 12/01/21 (from the past 24 hour(s))   Hemoglobin A1c   Result Value Ref Range    Estimated Average Glucose 143 mg/dL    Hemoglobin A1C 6.6 (H) 0.0 - 5.6 %   Basic metabolic panel   Result Value Ref Range    Sodium 137 133 - 144 mmol/L    Potassium 4.1 3.4 - 5.3 mmol/L    Chloride 106 94 - 109 mmol/L    Carbon Dioxide (CO2) 24 20 - 32 mmol/L    Anion Gap 7 3 - 14 mmol/L    Urea Nitrogen 16 7 - 30 mg/dL    Creatinine 0.87 0.66 - 1.25 mg/dL    Calcium 9.2 8.5 - 10.1 mg/dL    Glucose 106 (H) 70 - 99 mg/dL    GFR Estimate 87 >60 mL/min/1.73m2   Albumin Random Urine Quantitative with Creat Ratio   Result Value Ref Range    Creatinine Urine mg/dL 184 mg/dL    Albumin Urine mg/L 20 mg/L    Albumin Urine mg/g Cr 10.87 0.00 - 17.00 mg/g Cr   Lipid Profile (Chol, Trig, HDL, LDL calc)   Result Value Ref Range    Cholesterol 162 <200 mg/dL    Triglycerides 173 (H) <150 mg/dL    Direct Measure HDL 36 (L) >=40 mg/dL    LDL Cholesterol Calculated 91 <=100 mg/dL    Non HDL Cholesterol 126 <130 mg/dL    Patient Fasting > 8hrs? No     Narrative    Cholesterol  Desirable:  <200 mg/dL    Triglycerides  Normal:  Less than 150 mg/dL  Borderline High:  150-199 mg/dL  High:  200-499 mg/dL  Very  High:  Greater than or equal to 500 mg/dL    Direct Measure HDL  Female:  Greater than or equal to 50 mg/dL   Male:  Greater than or equal to 40 mg/dL    LDL Cholesterol  Desirable:  <100mg/dL  Above Desirable:  100-129 mg/dL   Borderline High:  130-159 mg/dL   High:  160-189 mg/dL   Very High:  >= 190 mg/dL    Non HDL Cholesterol  Desirable:  130 mg/dL  Above Desirable:  130-159 mg/dL  Borderline High:  160-189 mg/dL  High:  190-219 mg/dL  Very High:  Greater than or equal to 220 mg/dL   Hepatic panel (Albumin, ALT, AST, Bili, Alk Phos, TP)   Result Value Ref Range    Bilirubin Total 0.6 0.2 - 1.3 mg/dL    Bilirubin Direct <0.1 0.0 - 0.2 mg/dL    Protein Total 8.5 6.8 - 8.8 g/dL    Albumin 3.7 3.4 - 5.0 g/dL    Alkaline Phosphatase 59 40 - 150 U/L    AST 45 0 - 45 U/L    ALT 65 0 - 70 U/L

## 2021-12-01 ENCOUNTER — OFFICE VISIT (OUTPATIENT)
Dept: FAMILY MEDICINE | Facility: OTHER | Age: 71
End: 2021-12-01
Attending: NURSE PRACTITIONER
Payer: COMMERCIAL

## 2021-12-01 VITALS
HEIGHT: 69 IN | SYSTOLIC BLOOD PRESSURE: 120 MMHG | OXYGEN SATURATION: 95 % | TEMPERATURE: 97.5 F | WEIGHT: 235.2 LBS | DIASTOLIC BLOOD PRESSURE: 84 MMHG | HEART RATE: 69 BPM | BODY MASS INDEX: 34.83 KG/M2

## 2021-12-01 DIAGNOSIS — M65.30 TRIGGER FINGER, ACQUIRED: ICD-10-CM

## 2021-12-01 DIAGNOSIS — Z76.89 ENCOUNTER TO ESTABLISH CARE: Primary | ICD-10-CM

## 2021-12-01 DIAGNOSIS — I10 ESSENTIAL HYPERTENSION: ICD-10-CM

## 2021-12-01 DIAGNOSIS — Z95.828 HISTORY OF REPAIR OF ANEURYSM OF ABDOMINAL AORTA USING ENDOVASCULAR STENT GRAFT: ICD-10-CM

## 2021-12-01 DIAGNOSIS — L93.2 CUTANEOUS LUPUS ERYTHEMATOSUS: ICD-10-CM

## 2021-12-01 DIAGNOSIS — M10.00 IDIOPATHIC GOUT, UNSPECIFIED CHRONICITY, UNSPECIFIED SITE: ICD-10-CM

## 2021-12-01 DIAGNOSIS — E11.69 HYPERLIPIDEMIA ASSOCIATED WITH TYPE 2 DIABETES MELLITUS (H): ICD-10-CM

## 2021-12-01 DIAGNOSIS — E78.5 HYPERLIPIDEMIA ASSOCIATED WITH TYPE 2 DIABETES MELLITUS (H): ICD-10-CM

## 2021-12-01 DIAGNOSIS — E11.9 CONTROLLED TYPE 2 DIABETES MELLITUS WITHOUT COMPLICATION, WITHOUT LONG-TERM CURRENT USE OF INSULIN (H): ICD-10-CM

## 2021-12-01 DIAGNOSIS — K76.0 HEPATIC STEATOSIS: ICD-10-CM

## 2021-12-01 DIAGNOSIS — L13.0 DERMATITIS HERPETIFORMIS: ICD-10-CM

## 2021-12-01 PROBLEM — M25.60 JOINT STIFFNESS: Status: RESOLVED | Noted: 2021-06-15 | Resolved: 2021-12-01

## 2021-12-01 LAB
ALBUMIN SERPL-MCNC: 3.7 G/DL (ref 3.4–5)
ALP SERPL-CCNC: 59 U/L (ref 40–150)
ALT SERPL W P-5'-P-CCNC: 65 U/L (ref 0–70)
ANION GAP SERPL CALCULATED.3IONS-SCNC: 7 MMOL/L (ref 3–14)
AST SERPL W P-5'-P-CCNC: 45 U/L (ref 0–45)
BILIRUB DIRECT SERPL-MCNC: <0.1 MG/DL (ref 0–0.2)
BILIRUB SERPL-MCNC: 0.6 MG/DL (ref 0.2–1.3)
BUN SERPL-MCNC: 16 MG/DL (ref 7–30)
CALCIUM SERPL-MCNC: 9.2 MG/DL (ref 8.5–10.1)
CHLORIDE BLD-SCNC: 106 MMOL/L (ref 94–109)
CHOLEST SERPL-MCNC: 162 MG/DL
CO2 SERPL-SCNC: 24 MMOL/L (ref 20–32)
CREAT SERPL-MCNC: 0.87 MG/DL (ref 0.66–1.25)
CREAT UR-MCNC: 184 MG/DL
EST. AVERAGE GLUCOSE BLD GHB EST-MCNC: 143 MG/DL
FASTING STATUS PATIENT QL REPORTED: NO
GFR SERPL CREATININE-BSD FRML MDRD: 87 ML/MIN/1.73M2
GLUCOSE BLD-MCNC: 106 MG/DL (ref 70–99)
HBA1C MFR BLD: 6.6 % (ref 0–5.6)
HDLC SERPL-MCNC: 36 MG/DL
LDLC SERPL CALC-MCNC: 91 MG/DL
MICROALBUMIN UR-MCNC: 20 MG/L
MICROALBUMIN/CREAT UR: 10.87 MG/G CR (ref 0–17)
NONHDLC SERPL-MCNC: 126 MG/DL
POTASSIUM BLD-SCNC: 4.1 MMOL/L (ref 3.4–5.3)
PROT SERPL-MCNC: 8.5 G/DL (ref 6.8–8.8)
SODIUM SERPL-SCNC: 137 MMOL/L (ref 133–144)
TRIGL SERPL-MCNC: 173 MG/DL

## 2021-12-01 PROCEDURE — 80061 LIPID PANEL: CPT | Mod: ZL | Performed by: NURSE PRACTITIONER

## 2021-12-01 PROCEDURE — 80053 COMPREHEN METABOLIC PANEL: CPT | Mod: ZL | Performed by: NURSE PRACTITIONER

## 2021-12-01 PROCEDURE — 82043 UR ALBUMIN QUANTITATIVE: CPT | Mod: ZL | Performed by: NURSE PRACTITIONER

## 2021-12-01 PROCEDURE — G0463 HOSPITAL OUTPT CLINIC VISIT: HCPCS

## 2021-12-01 PROCEDURE — 99215 OFFICE O/P EST HI 40 MIN: CPT | Performed by: NURSE PRACTITIONER

## 2021-12-01 PROCEDURE — 83036 HEMOGLOBIN GLYCOSYLATED A1C: CPT | Mod: ZL | Performed by: NURSE PRACTITIONER

## 2021-12-01 PROCEDURE — 80048 BASIC METABOLIC PNL TOTAL CA: CPT | Mod: ZL | Performed by: NURSE PRACTITIONER

## 2021-12-01 PROCEDURE — 36415 COLL VENOUS BLD VENIPUNCTURE: CPT | Mod: ZL | Performed by: NURSE PRACTITIONER

## 2021-12-01 PROCEDURE — G0463 HOSPITAL OUTPT CLINIC VISIT: HCPCS | Mod: 25

## 2021-12-01 RX ORDER — PRAVASTATIN SODIUM 10 MG
TABLET ORAL
COMMUNITY
Start: 2021-08-16 | End: 2021-12-01

## 2021-12-01 RX ORDER — ALLOPURINOL 100 MG/1
200 TABLET ORAL
COMMUNITY
Start: 2021-08-16 | End: 2022-05-17

## 2021-12-01 RX ORDER — EZETIMIBE 10 MG/1
10 TABLET ORAL
COMMUNITY
Start: 2021-08-16 | End: 2022-06-07

## 2021-12-01 RX ORDER — PRAVASTATIN SODIUM 10 MG
TABLET ORAL
Qty: 24 TABLET | Refills: 3 | Status: SHIPPED | OUTPATIENT
Start: 2021-12-01 | End: 2024-01-17

## 2021-12-01 ASSESSMENT — PAIN SCALES - GENERAL: PAINLEVEL: MODERATE PAIN (4)

## 2021-12-01 ASSESSMENT — ANXIETY QUESTIONNAIRES
3. WORRYING TOO MUCH ABOUT DIFFERENT THINGS: NOT AT ALL
IF YOU CHECKED OFF ANY PROBLEMS ON THIS QUESTIONNAIRE, HOW DIFFICULT HAVE THESE PROBLEMS MADE IT FOR YOU TO DO YOUR WORK, TAKE CARE OF THINGS AT HOME, OR GET ALONG WITH OTHER PEOPLE: NOT DIFFICULT AT ALL
GAD7 TOTAL SCORE: 0
5. BEING SO RESTLESS THAT IT IS HARD TO SIT STILL: NOT AT ALL
2. NOT BEING ABLE TO STOP OR CONTROL WORRYING: NOT AT ALL
6. BECOMING EASILY ANNOYED OR IRRITABLE: NOT AT ALL
1. FEELING NERVOUS, ANXIOUS, OR ON EDGE: NOT AT ALL
7. FEELING AFRAID AS IF SOMETHING AWFUL MIGHT HAPPEN: NOT AT ALL
4. TROUBLE RELAXING: NOT AT ALL

## 2021-12-01 ASSESSMENT — MIFFLIN-ST. JEOR: SCORE: 1817.24

## 2021-12-01 NOTE — NURSING NOTE
"Chief Complaint   Patient presents with     Establish Care       Initial /84 (BP Location: Left arm, Patient Position: Chair, Cuff Size: Adult Large)   Pulse 69   Temp 97.5  F (36.4  C) (Tympanic)   Ht 1.753 m (5' 9\")   Wt 106.7 kg (235 lb 3.2 oz)   SpO2 95%   BMI 34.73 kg/m   Estimated body mass index is 34.73 kg/m  as calculated from the following:    Height as of this encounter: 1.753 m (5' 9\").    Weight as of this encounter: 106.7 kg (235 lb 3.2 oz).  Medication Reconciliation: complete  Li Hollingsworht LPN  "

## 2021-12-02 ASSESSMENT — PATIENT HEALTH QUESTIONNAIRE - PHQ9: SUM OF ALL RESPONSES TO PHQ QUESTIONS 1-9: 2

## 2021-12-02 ASSESSMENT — ANXIETY QUESTIONNAIRES: GAD7 TOTAL SCORE: 0

## 2021-12-22 DIAGNOSIS — I10 ESSENTIAL HYPERTENSION: Primary | ICD-10-CM

## 2021-12-22 RX ORDER — HYDROCHLOROTHIAZIDE 25 MG/1
25 TABLET ORAL DAILY
Qty: 90 TABLET | Refills: 1 | Status: SHIPPED | OUTPATIENT
Start: 2021-12-22 | End: 2023-03-08

## 2021-12-22 NOTE — TELEPHONE ENCOUNTER
Hydrochlorothiazide 25MG      Last Written Prescription Date:  historical  Last Fill Quantity: n/a,   # refills: n/a  Last Office Visit: 12/01/21  Future Office visit:    Next 5 appointments (look out 90 days)    Feb 15, 2022 11:00 AM  (Arrive by 10:45 AM)  Return Visit with Shanice Robledo NP  [facility] ([facility] [place] ) [address]  [place] [address]  [phone]           Routing refill request to provider for review/approval because:  Medication is reported/historical  PCP Usha Gray

## 2022-01-02 ENCOUNTER — HEALTH MAINTENANCE LETTER (OUTPATIENT)
Age: 72
End: 2022-01-02

## 2022-02-15 ENCOUNTER — OFFICE VISIT (OUTPATIENT)
Dept: OTOLARYNGOLOGY | Facility: OTHER | Age: 72
End: 2022-02-15
Attending: NURSE PRACTITIONER
Payer: MEDICARE

## 2022-02-15 VITALS
DIASTOLIC BLOOD PRESSURE: 86 MMHG | BODY MASS INDEX: 31.5 KG/M2 | HEIGHT: 71 IN | WEIGHT: 225 LBS | TEMPERATURE: 97 F | OXYGEN SATURATION: 95 % | HEART RATE: 85 BPM | SYSTOLIC BLOOD PRESSURE: 134 MMHG

## 2022-02-15 DIAGNOSIS — H91.93 DECREASED HEARING OF BOTH EARS: ICD-10-CM

## 2022-02-15 DIAGNOSIS — H90.3 SENSORINEURAL HEARING LOSS (SNHL) OF BOTH EARS: ICD-10-CM

## 2022-02-15 DIAGNOSIS — H61.23 BILATERAL IMPACTED CERUMEN: Primary | ICD-10-CM

## 2022-02-15 PROCEDURE — 69210 REMOVE IMPACTED EAR WAX UNI: CPT | Mod: 50

## 2022-02-15 PROCEDURE — 99212 OFFICE O/P EST SF 10 MIN: CPT | Mod: 25 | Performed by: NURSE PRACTITIONER

## 2022-02-15 PROCEDURE — 69210 REMOVE IMPACTED EAR WAX UNI: CPT | Mod: 50 | Performed by: NURSE PRACTITIONER

## 2022-02-15 PROCEDURE — G0463 HOSPITAL OUTPT CLINIC VISIT: HCPCS

## 2022-02-15 ASSESSMENT — MIFFLIN-ST. JEOR: SCORE: 1802.72

## 2022-02-15 ASSESSMENT — PAIN SCALES - GENERAL: PAINLEVEL: NO PAIN (0)

## 2022-02-15 NOTE — NURSING NOTE
"Chief Complaint   Patient presents with     Follow Up     Ear Cleaning        Initial /86 (BP Location: Right arm, Patient Position: Sitting, Cuff Size: Adult Large)   Pulse 85   Temp 97  F (36.1  C) (Tympanic)   Ht 1.803 m (5' 11\")   Wt 102.1 kg (225 lb)   SpO2 95%   BMI 31.38 kg/m   Estimated body mass index is 31.38 kg/m  as calculated from the following:    Height as of this encounter: 1.803 m (5' 11\").    Weight as of this encounter: 102.1 kg (225 lb).  Medication Reconciliation: complete  Merlyn Porter LPN    "

## 2022-02-15 NOTE — LETTER
2/15/2022         RE: Scott Akhtar  [address] [facility] [address]        Dear Colleague,    Thank you for referring your patient, Scott Akhtar, to the [facility]. Please see a copy of my visit note below.    Otolaryngology Note         Chief Complaint:     Patient presents with:  Follow Up: Ear Cleaning            History of Present Illness:     Scott Akhtar is a 71 year old male who presents today for ear cleaning.      He last had ears cleaned 6 months ago.   No concerns with hearing.  He feels that his hearing has gradually decreasing.   + tinnitus, not real bothersome  No rotary vertigo, facial numbness or weakness.   He has some occasional light headedness when moving from siitting to stranding  No current otalgia, otorrhea.    No hx of COM or otologic surgeries.      Audiogram completed 8/24/20 through [facility]'s ENT  Mild sloping to severe SNHL   Type A tympanogram bilaterally  Word discrimination 96% right, 92% left         Medications:     Current Outpatient Rx   Medication Sig Dispense Refill     allopurinol (ZYLOPRIM) 100 MG tablet Take 200 mg by mouth       ASPIRIN EC PO Take 81 mg by mouth daily       Cholecalciferol (VITAMIN D3 PO) Take 2,000 Units by mouth daily 2000 to 5000       ezetimibe (ZETIA) 10 MG tablet Take 10 mg by mouth       hydrochlorothiazide (HYDRODIURIL) 25 MG tablet Take 1 tablet (25 mg) by mouth daily 90 tablet 1     loratadine (CLARITIN) 10 MG tablet Take 10 mg by mouth daily as needed        METFORMIN HCL PO Take 500 mg by mouth 2 times daily (with meals)        Multiple Minerals (CALCIUM-MAGNESIUM-ZINC) TABS Take 1 tablet by mouth daily       Omega-3 Fatty Acids (OMEGA-3 FISH OIL PO) Take 1,200 mg by mouth daily        Potassium Chloride (KLOR-CON 10 PO) Take 1 tablet by mouth daily       pravastatin (PRAVACHOL) 10 MG tablet 1 tablet 2 times weekly. (Patient taking differently: 20 mg 1 tablet 2 times weekly. Monday and Thursday)  "24 tablet 3     ALLOPURINOL PO Take 100 mg by mouth daily       ibuprofen (ADVIL/MOTRIN) 800 MG tablet Take 800 mg by mouth every 8 hours as needed for moderate pain              Allergies:     Allergies: Pseudoephedrine hcl, Simvastatin, Sulfa drugs, and Chinese ink          Past Medical History:     Past Medical History:   Diagnosis Date     Aneurysm of thoracic aorta (H)      Diabetes (H)      External thrombosed hemorrhoids      Hiatal hernia      Hypertension      Need for prophylactic vaccination and inoculation against unspecified single bacterial disease      Other chest pain             Past Surgical History:     Past Surgical History:   Procedure Laterality Date     ABDOMINAL AORTIC ANEURYSM REPAIR       BACK SURGERY      laminectomy L5S1     COLONOSCOPY       COLONOSCOPY N/A 10/14/2016    Procedure: COLONOSCOPY;  Surgeon: Daljit Salazar MD;  Location: [facility]     FINGER GANGLION CYST EXCISION       L5 S1 Laminectomy  1991     ROTATOR CUFF REPAIR RT/LT  2006     SHOULDER SURGERY Right     replacement     TONSILLECTOMY  1971       ENT family history reviewed         Social History:     Social History     Tobacco Use     Smoking status: Former Smoker     Types: Cigarettes     Quit date: 1985     Years since quittin.1     Smokeless tobacco: Never Used   Substance Use Topics     Alcohol use: Yes     Alcohol/week: 0.0 standard drinks     Comment: 3 beers daily      Drug use: Never     Comment: medical marijuana            Review of Systems:     ROS: See HPI         Physical Exam:     /86 (BP Location: Right arm, Patient Position: Sitting, Cuff Size: Adult Large)   Pulse 85   Temp 97  F (36.1  C) (Tympanic)   Ht 1.803 m (5' 11\")   Wt 102.1 kg (225 lb)   SpO2 95%   BMI 31.38 kg/m      General - The patient is well nourished and well developed, and appears to have good nutritional status.  Alert and oriented to person and place, answers questions and cooperates with " examination appropriately.   Head and Face - Normocephalic and atraumatic, with no gross asymmetry noted.  The facial nerve is intact, with strong symmetric movements.  Voice and Breathing - The patient was breathing comfortably without the use of accessory muscles. There was no wheezing, stridor. The patients voice was clear and strong, and had appropriate pitch and quality.  Ears - The ears were examined with binocular microscopy and with otoscope.  External ears normal.  No sores or tenderness to the left pinna.  Right canal has cerumen impaction.  Left canal with cerumen impaction.  The right ear was cleaned with #7, #5 sucker.   Right tympanic membrane is intact without effusion, retraction or mass. The left ear was cleaned with #7 sucker.  Left tympanic membrane is intact without effusion, retraction or mass.  Eyes - Extraocular movements intact, sclera were not icteric or injected, conjunctiva were pink and moist.  Mouth - Examination of the oral cavity showed pink, healthy oral mucosa. Dentition in good condition. No lesions or ulcerations noted. The tongue was mobile and midline.   Throat - The walls of the oropharynx were smooth, pink, moist, symmetric, and had no lesions or ulcerations.  The tonsillar pillars and soft palate were symmetric. The uvula was midline on elevation.    Neck - Full range of motion on passive movement.  Palpation of the occipital, submental, submandibular, internal jugular chain, and supraclavicular nodes did not demonstrate any abnormal lymph nodes or masses.  Palpation of the thyroid was soft and smooth, with no nodules or goiter appreciated.  The trachea was mobile and midline.  Nose - External contour is symmetric, no gross deflection or scars.  Nasal mucosa is pink and moist with no abnormal mucus.  The septum and turbinates were evaluated with nasal speculum, no polyps, masses, or purulence noted on examination.         Assessment and Plan:       ICD-10-CM    1. Bilateral  impacted cerumen  H61.23    2. Decreased hearing of both ears  H91.93 Adult Audiology Referral   3. Sensorineural hearing loss (SNHL) of both ears  H90.3      Follow up for audiogram  Follow up in 3 months for ear cleaning  [facility] the [facility]    To see if you have medical coverage under your Medicare program, call [facility] at  [phone]    Shanice COCHRAN  [facility] ENT        Again, thank you for allowing me to participate in the care of your patient.        Sincerely,        Shanice Robledo NP

## 2022-02-15 NOTE — PROGRESS NOTES
Otolaryngology Note         Chief Complaint:     Patient presents with:  Follow Up: Ear Cleaning            History of Present Illness:     Scott Akhtar is a 71 year old male who presents today for ear cleaning.      He last had ears cleaned 6 months ago.   No concerns with hearing.  He feels that his hearing has gradually decreasing.   + tinnitus, not real bothersome  No rotary vertigo, facial numbness or weakness.   He has some occasional light headedness when moving from siitting to stranding  No current otalgia, otorrhea.    No hx of COM or otologic surgeries.      Audiogram completed 8/24/20 through [facility]'s ENT  Mild sloping to severe SNHL   Type A tympanogram bilaterally  Word discrimination 96% right, 92% left         Medications:     Current Outpatient Rx   Medication Sig Dispense Refill     allopurinol (ZYLOPRIM) 100 MG tablet Take 200 mg by mouth       ASPIRIN EC PO Take 81 mg by mouth daily       Cholecalciferol (VITAMIN D3 PO) Take 2,000 Units by mouth daily 2000 to 5000       ezetimibe (ZETIA) 10 MG tablet Take 10 mg by mouth       hydrochlorothiazide (HYDRODIURIL) 25 MG tablet Take 1 tablet (25 mg) by mouth daily 90 tablet 1     loratadine (CLARITIN) 10 MG tablet Take 10 mg by mouth daily as needed        METFORMIN HCL PO Take 500 mg by mouth 2 times daily (with meals)        Multiple Minerals (CALCIUM-MAGNESIUM-ZINC) TABS Take 1 tablet by mouth daily       Omega-3 Fatty Acids (OMEGA-3 FISH OIL PO) Take 1,200 mg by mouth daily        Potassium Chloride (KLOR-CON 10 PO) Take 1 tablet by mouth daily       pravastatin (PRAVACHOL) 10 MG tablet 1 tablet 2 times weekly. (Patient taking differently: 20 mg 1 tablet 2 times weekly. Monday and Thursday) 24 tablet 3     ALLOPURINOL PO Take 100 mg by mouth daily       ibuprofen (ADVIL/MOTRIN) 800 MG tablet Take 800 mg by mouth every 8 hours as needed for moderate pain              Allergies:     Allergies: Pseudoephedrine hcl, Simvastatin, Sulfa drugs, and  "Chinese ink          Past Medical History:     Past Medical History:   Diagnosis Date     Aneurysm of thoracic aorta (H)      Diabetes (H)      External thrombosed hemorrhoids      Hiatal hernia      Hypertension      Need for prophylactic vaccination and inoculation against unspecified single bacterial disease      Other chest pain             Past Surgical History:     Past Surgical History:   Procedure Laterality Date     ABDOMINAL AORTIC ANEURYSM REPAIR       BACK SURGERY      laminectomy L5S1     COLONOSCOPY       COLONOSCOPY N/A 10/14/2016    Procedure: COLONOSCOPY;  Surgeon: Daljit Salazar MD;  Location: [facility]     FINGER GANGLION CYST EXCISION       L5 S1 Laminectomy  1991     ROTATOR CUFF REPAIR RT/LT  2006     SHOULDER SURGERY Right     replacement     TONSILLECTOMY  1971       ENT family history reviewed         Social History:     Social History     Tobacco Use     Smoking status: Former Smoker     Types: Cigarettes     Quit date: 1985     Years since quittin.1     Smokeless tobacco: Never Used   Substance Use Topics     Alcohol use: Yes     Alcohol/week: 0.0 standard drinks     Comment: 3 beers daily      Drug use: Never     Comment: medical marijuana            Review of Systems:     ROS: See HPI         Physical Exam:     /86 (BP Location: Right arm, Patient Position: Sitting, Cuff Size: Adult Large)   Pulse 85   Temp 97  F (36.1  C) (Tympanic)   Ht 1.803 m (5' 11\")   Wt 102.1 kg (225 lb)   SpO2 95%   BMI 31.38 kg/m      General - The patient is well nourished and well developed, and appears to have good nutritional status.  Alert and oriented to person and place, answers questions and cooperates with examination appropriately.   Head and Face - Normocephalic and atraumatic, with no gross asymmetry noted.  The facial nerve is intact, with strong symmetric movements.  Voice and Breathing - The patient was breathing comfortably without the use of accessory " muscles. There was no wheezing, stridor. The patients voice was clear and strong, and had appropriate pitch and quality.  Ears - The ears were examined with binocular microscopy and with otoscope.  External ears normal.  No sores or tenderness to the left pinna.  Right canal has cerumen impaction.  Left canal with cerumen impaction.  The right ear was cleaned with #7, #5 sucker.   Right tympanic membrane is intact without effusion, retraction or mass. The left ear was cleaned with #7 sucker.  Left tympanic membrane is intact without effusion, retraction or mass.  Eyes - Extraocular movements intact, sclera were not icteric or injected, conjunctiva were pink and moist.  Mouth - Examination of the oral cavity showed pink, healthy oral mucosa. Dentition in good condition. No lesions or ulcerations noted. The tongue was mobile and midline.   Throat - The walls of the oropharynx were smooth, pink, moist, symmetric, and had no lesions or ulcerations.  The tonsillar pillars and soft palate were symmetric. The uvula was midline on elevation.    Neck - Full range of motion on passive movement.  Palpation of the occipital, submental, submandibular, internal jugular chain, and supraclavicular nodes did not demonstrate any abnormal lymph nodes or masses.  Palpation of the thyroid was soft and smooth, with no nodules or goiter appreciated.  The trachea was mobile and midline.  Nose - External contour is symmetric, no gross deflection or scars.  Nasal mucosa is pink and moist with no abnormal mucus.  The septum and turbinates were evaluated with nasal speculum, no polyps, masses, or purulence noted on examination.         Assessment and Plan:       ICD-10-CM    1. Bilateral impacted cerumen  H61.23    2. Decreased hearing of both ears  H91.93 Adult Audiology Referral   3. Sensorineural hearing loss (SNHL) of both ears  H90.3      Follow up for audiogram  Follow up in 3 months for ear cleaning  [facility]    To see if you have medical coverage under your Medicare program, call [employer] at  [phone]    Shanice LAZARC  [facility] ENT

## 2022-03-18 DIAGNOSIS — Z95.828 HISTORY OF REPAIR OF ANEURYSM OF ABDOMINAL AORTA USING ENDOVASCULAR STENT GRAFT: Primary | ICD-10-CM

## 2022-03-18 DIAGNOSIS — I10 ESSENTIAL HYPERTENSION: ICD-10-CM

## 2022-03-18 RX ORDER — POTASSIUM CHLORIDE 750 MG/1
10 TABLET, EXTENDED RELEASE ORAL DAILY
Qty: 10 TABLET | Refills: 0 | Status: SHIPPED | OUTPATIENT
Start: 2022-03-18 | End: 2023-09-08

## 2022-03-18 NOTE — TELEPHONE ENCOUNTER
Patient need's about 10 pills sent to [facility].  [facility] and they [facility]'t [facility] them yet and he will need a short fill.      Potassium Chloride (KLOR-CON 10 PO      Last Written Prescription Date:    Last Fill Quantity: ,   # refills:   Last Office Visit: 12/1/21  Future Office visit:    Next 5 appointments (look out 90 days)    May 17, 2022 10:00 AM  (Arrive by 9:45 AM)  Return Visit with Shanice Robledo NP  [facility] ([facility] [place] ) [address] [place] [address]  [phone]   Jun 06, 2022  2:10 PM  (Arrive by 1:55 PM)  SHORT with Usha Gray NP  [facility] ([facility] [place] ) [address]  [place] [address]  [phone]           Routing refill request to provider for review/approval because:  Medication is reported/historical

## 2022-04-24 ENCOUNTER — HEALTH MAINTENANCE LETTER (OUTPATIENT)
Age: 72
End: 2022-04-24

## 2022-05-17 ENCOUNTER — OFFICE VISIT (OUTPATIENT)
Dept: OTOLARYNGOLOGY | Facility: OTHER | Age: 72
End: 2022-05-17
Attending: NURSE PRACTITIONER
Payer: MEDICARE

## 2022-05-17 VITALS
SYSTOLIC BLOOD PRESSURE: 110 MMHG | OXYGEN SATURATION: 97 % | HEART RATE: 71 BPM | WEIGHT: 225 LBS | BODY MASS INDEX: 31.38 KG/M2 | TEMPERATURE: 98.3 F | DIASTOLIC BLOOD PRESSURE: 70 MMHG

## 2022-05-17 DIAGNOSIS — R05.9 COUGH: Primary | ICD-10-CM

## 2022-05-17 DIAGNOSIS — J34.89 RHINORRHEA: ICD-10-CM

## 2022-05-17 DIAGNOSIS — R09.82 POST-NASAL DRIP: ICD-10-CM

## 2022-05-17 DIAGNOSIS — J30.89 PERENNIAL ALLERGIC RHINITIS WITH SEASONAL VARIATION: ICD-10-CM

## 2022-05-17 DIAGNOSIS — J30.2 PERENNIAL ALLERGIC RHINITIS WITH SEASONAL VARIATION: ICD-10-CM

## 2022-05-17 PROCEDURE — 86003 ALLG SPEC IGE CRUDE XTRC EA: CPT | Mod: ZL | Performed by: NURSE PRACTITIONER

## 2022-05-17 PROCEDURE — 69210 REMOVE IMPACTED EAR WAX UNI: CPT | Mod: 50 | Performed by: NURSE PRACTITIONER

## 2022-05-17 PROCEDURE — 82785 ASSAY OF IGE: CPT | Mod: ZL | Performed by: NURSE PRACTITIONER

## 2022-05-17 PROCEDURE — 69210 REMOVE IMPACTED EAR WAX UNI: CPT | Performed by: NURSE PRACTITIONER

## 2022-05-17 PROCEDURE — 99213 OFFICE O/P EST LOW 20 MIN: CPT | Performed by: NURSE PRACTITIONER

## 2022-05-17 PROCEDURE — 36415 COLL VENOUS BLD VENIPUNCTURE: CPT | Mod: ZL | Performed by: NURSE PRACTITIONER

## 2022-05-17 PROCEDURE — G0463 HOSPITAL OUTPT CLINIC VISIT: HCPCS | Mod: 25

## 2022-05-17 RX ORDER — MONTELUKAST SODIUM 10 MG/1
10 TABLET ORAL AT BEDTIME
Qty: 30 TABLET | Refills: 1 | Status: SHIPPED | OUTPATIENT
Start: 2022-05-17 | End: 2022-08-31

## 2022-05-17 ASSESSMENT — PAIN SCALES - GENERAL: PAINLEVEL: MILD PAIN (2)

## 2022-05-17 NOTE — LETTER
5/17/2022         RE: Scott Akhtar  [address] [facility] [address]        Dear Colleague,    Thank you for referring your patient, Scott Akhtar, to the [facility]. Please see a copy of my visit note below.    Otolaryngology Note         Chief Complaint:     Patient presents with:  Follow Up: 3 Month Ear Cleaning            History of Present Illness:     Scott Akhtar is a 72 year old male who presents today for ear cleaning.      He last had ears cleaned 3 months ago.   No concerns with hearing.   + tinnitus, not real bothersome  No rotary vertigo, facial numbness or weakness.   No current otalgia, otorrhea.    No hx of COM or otologic surgeries.      Audiogram completed 8/24/20 through [facility] ENT  Mild sloping to severe SNHL   Type A tympanogram bilaterally  Word discrimination 96% right, 92% left    He has been having symptoms of allergies.  He has frequent cough, tickle.  Sneezing, itchy water eyes.      Occasional wheeze and shortness of breath  No chronic sore throat  No dysphagia  Occasional nausea  No weight loss, odynophagia, ear pain    + temple and forehead headache, unsure of light sensitivity.  Headaches are mild and daily.  Symptoms have been present for about a year- 2 years.  Spring and fall is worst.  He takes zyrtec currently.  No previous allergy testing completed         Medications:     Current Outpatient Rx   Medication Sig Dispense Refill     ALLOPURINOL PO Take 100 mg by mouth 2 times daily       ASPIRIN EC PO Take 81 mg by mouth daily       Cholecalciferol (VITAMIN D3 PO) Take 5,000 Units by mouth daily 2000 to 5000       ezetimibe (ZETIA) 10 MG tablet Take 10 mg by mouth       hydrochlorothiazide (HYDRODIURIL) 25 MG tablet Take 1 tablet (25 mg) by mouth daily 90 tablet 1     ibuprofen (ADVIL/MOTRIN) 800 MG tablet Take 800 mg by mouth every 8 hours as needed for moderate pain       loratadine (CLARITIN) 10 MG tablet Take 10 mg by mouth  daily as needed        METFORMIN HCL PO Take 500 mg by mouth 2 times daily (with meals)        montelukast (SINGULAIR) 10 MG tablet Take 1 tablet (10 mg) by mouth At Bedtime 30 tablet 1     Multiple Minerals (CALCIUM-MAGNESIUM-ZINC) TABS Take 1 tablet by mouth daily       Omega-3 Fatty Acids (OMEGA-3 FISH OIL PO) Take 1,200 mg by mouth daily        potassium chloride ER (KLOR-CON) 10 MEQ CR tablet Take 1 tablet (10 mEq) by mouth daily 10 tablet 0     pravastatin (PRAVACHOL) 10 MG tablet 1 tablet 2 times weekly. (Patient taking differently: 20 mg Patient takes 1 tablet every monday) 24 tablet 3     UNKNOWN TO PATIENT Patient takes an allergy medication.  Unknown name              Allergies:     Allergies: Pseudoephedrine hcl, Simvastatin, Sulfa drugs, and Chinese ink          Past Medical History:     Past Medical History:   Diagnosis Date     Aneurysm of thoracic aorta (H)      Diabetes (H)      External thrombosed hemorrhoids      Hiatal hernia      Hypertension      Need for prophylactic vaccination and inoculation against unspecified single bacterial disease      Other chest pain             Past Surgical History:     Past Surgical History:   Procedure Laterality Date     ABDOMINAL AORTIC ANEURYSM REPAIR       BACK SURGERY      laminectomy L5S1     COLONOSCOPY       COLONOSCOPY N/A 10/14/2016    Procedure: COLONOSCOPY;  Surgeon: Daljit Salazar MD;  Location: [facility]     FINGER GANGLION CYST EXCISION       L5 S1 Laminectomy  1991     ROTATOR CUFF REPAIR RT/LT  2006     SHOULDER SURGERY Right     replacement     TONSILLECTOMY  1971       ENT family history reviewed         Social History:     Social History     Tobacco Use     Smoking status: Former Smoker     Types: Cigarettes     Quit date: 1985     Years since quittin.3     Smokeless tobacco: Never Used   Substance Use Topics     Alcohol use: Yes     Alcohol/week: 0.0 standard drinks     Comment: 3 beers daily      Drug use: Never      Comment: medical marijuana            Review of Systems:     ROS: See HPI         Physical Exam:     /70 (BP Location: Right arm, Patient Position: Sitting, Cuff Size: Adult Large)   Pulse 71   Temp 98.3  F (36.8  C) (Tympanic)   Wt 102.1 kg (225 lb)   SpO2 97%   BMI 31.38 kg/m      General - The patient is well nourished and well developed, and appears to have good nutritional status.  Alert and oriented to person and place, answers questions and cooperates with examination appropriately.   Head and Face - Normocephalic and atraumatic, with no gross asymmetry noted.  The facial nerve is intact, with strong symmetric movements.  Voice and Breathing - The patient was breathing comfortably without the use of accessory muscles. There was no wheezing, stridor. The patients voice was clear and strong, and had appropriate pitch and quality.  Ears - The ears were examined with binocular microscopy and with otoscope.  External ears normal.  Right canal has cerumen impaction.  Left canal with cerumen impaction.  The right ear was cleaned with #7, #5 sucker.   Right tympanic membrane is intact without effusion, retraction or mass. The left ear was cleaned with #7 sucker.  Left tympanic membrane is intact without effusion, retraction or mass.  Eyes - Extraocular movements intact, sclera were not icteric or injected, conjunctiva were pink and moist.  Mouth - Examination of the oral cavity showed pink, healthy oral mucosa. Dentition in good condition. No lesions or ulcerations noted. The tongue was mobile and midline.   Throat - The walls of the oropharynx were smooth, pink, moist, symmetric, and had no lesions or ulcerations.  The tonsillar pillars and soft palate were symmetric. The uvula was midline on elevation.    Neck - Full range of motion on passive movement.  Palpation of the occipital, submental, submandibular, internal jugular chain, and supraclavicular nodes did not demonstrate any abnormal lymph nodes  or masses.  Palpation of the thyroid was soft and smooth, with no nodules or goiter appreciated.  The trachea was mobile and midline.  Nose - External contour is symmetric, no gross deflection or scars.  Nasal mucosa is pink and moist with no abnormal mucus.  The septum and turbinates were evaluated with nasal speculum, no polyps, masses, or purulence noted on examination.         Assessment and Plan:       ICD-10-CM    1. Cough  R05.9 montelukast (SINGULAIR) 10 MG tablet     Inhalent Panel [place] (Serolab)     Total IgE (Serolab)     Inhalent Panel [place] (Serolab)     Total IgE (Serolab)   2. Rhinorrhea  J34.89 montelukast (SINGULAIR) 10 MG tablet     [place]halent Panel [place] (Serolab)     Total IgE (Serolab)     [place]halent Panel [place] (Serolab)     Total IgE (Serolab)   3. Post-nasal drip  R09.82 montelukast (SINGULAIR) 10 MG tablet     [place]halent Panel [place] (Serolab)     Total IgE (Serolab)     [place]halent Panel [place] (Serolab)     Total IgE (Serolab)   4. Perennial allergic rhinitis with seasonal variation  J30.89 montelukast (SINGULAIR) 10 MG tablet    J30.2 Inhalent Panel [place] (Serolab)     Total IgE (Serolab)     Inhalent Panel [place] (Serolab)     Total IgE (Serolab)     Follow up in 3 months for ear cleaning  Follow up in 3-4 weeks for recheck  Start Singulair as prescribed, we discussed black box warning for mental health changes, written instructions given on Singulair.   Continue Zyrtec  Complete Inhalent RAST panel, I will call you in 10-14 days with results    Shanice COCHRAN  [facility] ENT        Again, thank you for allowing me to participate in the care of your patient.        Sincerely,        Shanice Robledo NP

## 2022-05-17 NOTE — PROGRESS NOTES
Otolaryngology Note         Chief Complaint:     Patient presents with:  Follow Up: 3 Month Ear Cleaning            History of Present Illness:     Scott Akhtar is a 72 year old male who presents today for ear cleaning.      He last had ears cleaned 3 months ago.   No concerns with hearing.   + tinnitus, not real bothersome  No rotary vertigo, facial numbness or weakness.   No current otalgia, otorrhea.    No hx of COM or otologic surgeries.      Audiogram completed 8/24/20 through [facility]  Mild sloping to severe SNHL   Type A tympanogram bilaterally  Word discrimination 96% right, 92% left    He has been having symptoms of allergies.  He has frequent cough, tickle.  Sneezing, itchy water eyes.      Occasional wheeze and shortness of breath  No chronic sore throat  No dysphagia  Occasional nausea  No weight loss, odynophagia, ear pain    + temple and forehead headache, unsure of light sensitivity.  Headaches are mild and daily.  Symptoms have been present for about a year- 2 years.  Spring and fall is worst.  He takes zyrtec currently.  No previous allergy testing completed         Medications:     Current Outpatient Rx   Medication Sig Dispense Refill     ALLOPURINOL PO Take 100 mg by mouth 2 times daily       ASPIRIN EC PO Take 81 mg by mouth daily       Cholecalciferol (VITAMIN D3 PO) Take 5,000 Units by mouth daily 2000 to 5000       ezetimibe (ZETIA) 10 MG tablet Take 10 mg by mouth       hydrochlorothiazide (HYDRODIURIL) 25 MG tablet Take 1 tablet (25 mg) by mouth daily 90 tablet 1     ibuprofen (ADVIL/MOTRIN) 800 MG tablet Take 800 mg by mouth every 8 hours as needed for moderate pain       loratadine (CLARITIN) 10 MG tablet Take 10 mg by mouth daily as needed        METFORMIN HCL PO Take 500 mg by mouth 2 times daily (with meals)        montelukast (SINGULAIR) 10 MG tablet Take 1 tablet (10 mg) by mouth At Bedtime 30 tablet 1     Multiple Minerals (CALCIUM-MAGNESIUM-ZINC) TABS Take 1 tablet by  mouth daily       Omega-3 Fatty Acids (OMEGA-3 FISH OIL PO) Take 1,200 mg by mouth daily        potassium chloride ER (KLOR-CON) 10 MEQ CR tablet Take 1 tablet (10 mEq) by mouth daily 10 tablet 0     pravastatin (PRAVACHOL) 10 MG tablet 1 tablet 2 times weekly. (Patient taking differently: 20 mg Patient takes 1 tablet every monday) 24 tablet 3     UNKNOWN TO PATIENT Patient takes an allergy medication.  Unknown name              Allergies:     Allergies: Pseudoephedrine hcl, Simvastatin, Sulfa drugs, and Chinese ink          Past Medical History:     Past Medical History:   Diagnosis Date     Aneurysm of thoracic aorta (H)      Diabetes (H)      External thrombosed hemorrhoids      Hiatal hernia      Hypertension      Need for prophylactic vaccination and inoculation against unspecified single bacterial disease      Other chest pain             Past Surgical History:     Past Surgical History:   Procedure Laterality Date     ABDOMINAL AORTIC ANEURYSM REPAIR       BACK SURGERY      laminectomy L5S1     COLONOSCOPY       COLONOSCOPY N/A 10/14/2016    Procedure: COLONOSCOPY;  Surgeon: Daljit Salazar MD;  Location: [facility]     FINGER GANGLION CYST EXCISION       L5 S1 Laminectomy  1991     ROTATOR CUFF REPAIR RT/LT  2006     SHOULDER SURGERY Right     replacement     TONSILLECTOMY  1971       ENT family history reviewed         Social History:     Social History     Tobacco Use     Smoking status: Former Smoker     Types: Cigarettes     Quit date: 1985     Years since quittin.3     Smokeless tobacco: Never Used   Substance Use Topics     Alcohol use: Yes     Alcohol/week: 0.0 standard drinks     Comment: 3 beers daily      Drug use: Never     Comment: medical marijuana            Review of Systems:     ROS: See HPI         Physical Exam:     /70 (BP Location: Right arm, Patient Position: Sitting, Cuff Size: Adult Large)   Pulse 71   Temp 98.3  F (36.8  C) (Tympanic)   Wt 102.1 kg  (225 lb)   SpO2 97%   BMI 31.38 kg/m      General - The patient is well nourished and well developed, and appears to have good nutritional status.  Alert and oriented to person and place, answers questions and cooperates with examination appropriately.   Head and Face - Normocephalic and atraumatic, with no gross asymmetry noted.  The facial nerve is intact, with strong symmetric movements.  Voice and Breathing - The patient was breathing comfortably without the use of accessory muscles. There was no wheezing, stridor. The patients voice was clear and strong, and had appropriate pitch and quality.  Ears - The ears were examined with binocular microscopy and with otoscope.  External ears normal.  Right canal has cerumen impaction.  Left canal with cerumen impaction.  The right ear was cleaned with #7, #5 sucker.   Right tympanic membrane is intact without effusion, retraction or mass. The left ear was cleaned with #7 sucker.  Left tympanic membrane is intact without effusion, retraction or mass.  Eyes - Extraocular movements intact, sclera were not icteric or injected, conjunctiva were pink and moist.  Mouth - Examination of the oral cavity showed pink, healthy oral mucosa. Dentition in good condition. No lesions or ulcerations noted. The tongue was mobile and midline.   Throat - The walls of the oropharynx were smooth, pink, moist, symmetric, and had no lesions or ulcerations.  The tonsillar pillars and soft palate were symmetric. The uvula was midline on elevation.    Neck - Full range of motion on passive movement.  Palpation of the occipital, submental, submandibular, internal jugular chain, and supraclavicular nodes did not demonstrate any abnormal lymph nodes or masses.  Palpation of the thyroid was soft and smooth, with no nodules or goiter appreciated.  The trachea was mobile and midline.  Nose - External contour is symmetric, no gross deflection or scars.  Nasal mucosa is pink and moist with no abnormal  mucus.  The septum and turbinates were evaluated with nasal speculum, no polyps, masses, or purulence noted on examination.         Assessment and Plan:       ICD-10-CM    1. Cough  R05.9 montelukast (SINGULAIR) 10 MG tablet     Inhalent Panel [place] (Serolab)     Total IgE (Serolab)     Inhalent Panel [place] (Serolab)     Total IgE (Serolab)   2. Rhinorrhea  J34.89 montelukast (SINGULAIR) 10 MG tablet     Inhalent Panel [place] (Serolab)     Total IgE (Serolab)     Inhalent Panel [place] (Serolab)     Total IgE (Serolab)   3. Post-nasal drip  R09.82 montelukast (SINGULAIR) 10 MG tablet     Inhalent Panel [place] (Serolab)     Total IgE (Serolab)     Inhalent Panel [place] (Serolab)     Total IgE (Serolab)   4. Perennial allergic rhinitis with seasonal variation  J30.89 montelukast (SINGULAIR) 10 MG tablet    J30.2 Inhalent Panel [place] (Serolab)     Total IgE (Serolab)     Inhalent Panel [place] (Serolab)     Total IgE (Serolab)     Follow up in 3 months for ear cleaning  Follow up in 3-4 weeks for recheck  Start Singulair as prescribed, we discussed black box warning for mental health changes, written instructions given on Singulair.   Continue Zyrtec  Complete Inhalent RAST panel, I will call you in 10-14 days with results    Shanice Robledo NP-C  [facility]

## 2022-05-17 NOTE — NURSING NOTE
"Chief Complaint   Patient presents with     Follow Up     3 Month Ear Cleaning        Initial /70 (BP Location: Right arm, Patient Position: Sitting, Cuff Size: Adult Large)   Pulse 71   Temp 98.3  F (36.8  C) (Tympanic)   Wt 102.1 kg (225 lb)   SpO2 97%   BMI 31.38 kg/m   Estimated body mass index is 31.38 kg/m  as calculated from the following:    Height as of 2/15/22: 1.803 m (5' 11\").    Weight as of this encounter: 102.1 kg (225 lb).  Medication Reconciliation: complete  FRANKI QUILES LPN    "

## 2022-05-24 NOTE — PROGRESS NOTES
"  Assessment & Plan     Essential hypertension  Well controlled. Continue current medications. Encouraged daily exercise and a low sodium diet. Recommended checking BP's 2x/wk, call the clinic if consistantly s>140 or d>90. Follow up in 3 months.     Controlled type 2 diabetes mellitus without complication, without long-term current use of insulin (H)  A1C 6.5. controlled. Will continue Metformin. On statin. On asa. BP at goal. See me back 3 months.     - Hemoglobin A1c    Hyperlipidemia associated with type 2 diabetes mellitus (H)  Does not tolerate statins. Taking pravastatin once weekly with Zetia daily. Will continue. Lipids well controlled today.   - Lipid Profile (Chol, Trig, HDL, LDL calc)  - Comprehensive metabolic panel (BMP + Alb, Alk Phos, ALT, AST, Total. Bili, TP)  - ezetimibe (ZETIA) 10 MG tablet; Take 1 tablet (10 mg) by mouth daily    Dizziness  CBC normal. CMP and TSH pending. Will update carotid US as he notes that he had an abnormal one in the past. Will notify patient of the results when available and intervene accordingly.     ALDANA (dyspnea on exertion)  Chest pain, unspecified type  Patient with intermittent chest pain and ALDANA. EKG without acute changes. Blood work unremarkable. He does have DM and hyperlipidemia. Will therefore order stress test. Will notify patient of the results when available and intervene accordingly.     - EKG 12-lead complete w/read - (Clinic Performed)  - NM MPI Treadmill; Future    Fatigue, unspecified type  BMI 32. Unsure if he snores. CBC normal. TSH and CMP pending. Will also send for sleep study. May have sleep apnea. Will notify patient of the results when available and intervene accordingly.     - Adult Sleep Eval & Management  Referral; Future  - CBC with platelets and differential  - TSH with free T4 reflex; Future  56}     BMI:   Estimated body mass index is 32.08 kg/m  as calculated from the following:    Height as of 2/15/22: 1.803 m (5' 11\").    " Weight as of this encounter: 104.3 kg (230 lb).   Weight management plan: Discussed healthy diet and exercise guidelines    Return in about 3 months (around 9/7/2022).     40 minutes spent on the date of the encounter doing chart review, review of test results, interpretation of tests, patient visit and documentation     Usha Gray NP  [facility] Chavez is a 72 year old who presents for the following health issues     HPI       Diabetes Follow-up    How often are you checking your blood sugar? A few times a week  What time of day are you checking your blood sugars (select all that apply)?  Before and after meals  Have you had any blood sugars above 200?  No  Have you had any blood sugars below 70?  No    What symptoms do you notice when your blood sugar is low?  None    What concerns do you have today about your diabetes? None     Do you have any of these symptoms? (Select all that apply)  No numbness or tingling in feet.  No redness, sores or blisters on feet.  No complaints of excessive thirst.  No reports of blurry vision.  No significant changes to weight.     A1C was 6.6 on 12/1/22.     Taking Metformin 500 mg b.i.d. without side effects. Did have nausea with higher doses of Metformin.     On asa 81 mg.     He does complain of fatigue and dyspnea with exertion while walking. Occasional left sided chest pain that does not radiate. No numbness or tingling. No fevers. He does feel his allergies are acting up. Sneezing often with some nasal congestion. Was recently started on Singulair, but he is unsure if it is helping. He did have a stress test in 2017 for chest pain and ALDANA, no evidence of myocardial ischemia. He does feel his symptoms have worsened. No family h/o of CAD. As noted below, does not tolerate taking a daily statin. No nausea or vomiting. No diaphoresis.     Hyperlipidemia Follow-Up      Are you regularly taking any medication or supplement to lower your  cholesterol?  Yes; takes Pravastatin once weekly with Zetia, does not tolerate taking the statin daily. Developed stomach aches with Crestor, simvastatin, and atorvastatin.     Are you having muscle aches or other side effects that you think could be caused by your cholesterol lowering medication?  No        He does have PAD and as had an aortic aneurysm repair. Follows with Dr. Kern. Dr. Seay repaired in 2018. He does have another MRI scheduled to assess the repair.     Hypertension Follow-up      Do you check your blood pressure regularly outside of the clinic? No     Are you following a low salt diet? Yes    Are your blood pressures ever more than 140 on the top number (systolic) OR more   than 90 on the bottom number (diastolic), for example 140/90? N/A  Taking hydrochlorothiazide 25 mg daily without side effects. Also on potassium.     He does have a h/o gout, taking allopurinal 200 mg daily without side effects. No recent flares.     BP Readings from Last 2 Encounters:   06/07/22 128/76   05/17/22 110/70     Hemoglobin A1C (%)   Date Value   12/01/2021 6.6 (H)     LDL Cholesterol Calculated (mg/dL)   Date Value   12/01/2021 91         Review of Systems   Constitutional, HEENT, cardiovascular, pulmonary, gi and gu systems are negative, except as otherwise noted.      Objective    /76   Pulse 56   Temp 97.9  F (36.6  C) (Tympanic)   Resp 18   Wt 104.3 kg (230 lb)   SpO2 92%   BMI 32.08 kg/m    Body mass index is 32.08 kg/m .  Physical Exam   GENERAL: alert, no distress and over weight  EYES: Eyes grossly normal to inspection, PERRL and conjunctivae and sclerae normal  HENT: ear canals and TM's normal, nose and mouth without ulcers or lesions  NECK: no adenopathy, no asymmetry, masses, or scars and thyroid normal to palpation  RESP: lungs clear to auscultation - no rales, rhonchi or wheezes  CV: regular rate and rhythm, no murmur, click or rub, no peripheral edema and peripheral pulses  strong  ABDOMEN: soft, nontender, no hepatosplenomegaly, no masses and bowel sounds normal  MS: no gross musculoskeletal defects noted, no edema  NEURO: Normal strength and tone, sensory exam grossly normal, mentation intact, oriented times 3, speech normal, cranial nerves 2-12 intact and rapid alternating movements normal  PSYCH: mentation appears normal, affect normal/bright  Diabetic foot exam: normal DP and PT pulses, no trophic changes or ulcerative lesions and normal sensory exam    Results for orders placed or performed in visit on 06/07/22 (from the past 24 hour(s))   Comprehensive metabolic panel (BMP + Alb, Alk Phos, ALT, AST, Total. Bili, TP)   Result Value Ref Range    Sodium 137 133 - 144 mmol/L    Potassium 3.6 3.4 - 5.3 mmol/L    Chloride 102 94 - 109 mmol/L    Carbon Dioxide (CO2)      Anion Gap      Urea Nitrogen      Creatinine      Calcium      Glucose      Alkaline Phosphatase      AST      ALT      Protein Total      Albumin      Bilirubin Total      GFR Estimate     CBC with platelets and differential    Narrative    The following orders were created for panel order CBC with platelets and differential.  Procedure                               Abnormality         Status                     ---------                               -----------         ------                     CBC with platelets and d...[[id number]]                      Final result                 Please view results for these tests on the individual orders.   CBC with platelets and differential   Result Value Ref Range    WBC Count 5.9 4.0 - 11.0 10e3/uL    RBC Count 5.00 4.40 - 5.90 10e6/uL    Hemoglobin 14.9 13.3 - 17.7 g/dL    Hematocrit 46.2 40.0 - 53.0 %    MCV 92 78 - 100 fL    MCH 29.8 26.5 - 33.0 pg    MCHC 32.3 31.5 - 36.5 g/dL    RDW 14.8 10.0 - 15.0 %    Platelet Count 180 150 - 450 10e3/uL    % Neutrophils 64 %    % Lymphocytes 22 %    % Monocytes 9 %    % Eosinophils 4 %    % Basophils 1 %    % Immature Granulocytes  0 %    NRBCs per 100 WBC 0 <1 /100    Absolute Neutrophils 3.8 1.6 - 8.3 10e3/uL    Absolute Lymphocytes 1.3 0.8 - 5.3 10e3/uL    Absolute Monocytes 0.5 0.0 - 1.3 10e3/uL    Absolute Eosinophils 0.2 0.0 - 0.7 10e3/uL    Absolute Basophils 0.0 0.0 - 0.2 10e3/uL    Absolute Immature Granulocytes 0.0 <=0.4 10e3/uL    Absolute NRBCs 0.0 10e3/uL     Other labs pending    EKG: Sinus bk, VR 59, no previous EKG to compare to

## 2022-06-02 LAB
SCANNED LAB RESULT: NORMAL
SCANNED LAB RESULT: NORMAL

## 2022-06-03 ENCOUNTER — TELEPHONE (OUTPATIENT)
Dept: OTOLARYNGOLOGY | Facility: OTHER | Age: 72
End: 2022-06-03
Payer: COMMERCIAL

## 2022-06-07 ENCOUNTER — OFFICE VISIT (OUTPATIENT)
Dept: FAMILY MEDICINE | Facility: OTHER | Age: 72
End: 2022-06-07
Attending: NURSE PRACTITIONER
Payer: COMMERCIAL

## 2022-06-07 VITALS
WEIGHT: 230 LBS | RESPIRATION RATE: 18 BRPM | SYSTOLIC BLOOD PRESSURE: 128 MMHG | OXYGEN SATURATION: 92 % | BODY MASS INDEX: 32.08 KG/M2 | HEART RATE: 56 BPM | DIASTOLIC BLOOD PRESSURE: 76 MMHG | TEMPERATURE: 97.9 F

## 2022-06-07 DIAGNOSIS — R42 DIZZINESS: ICD-10-CM

## 2022-06-07 DIAGNOSIS — E11.9 CONTROLLED TYPE 2 DIABETES MELLITUS WITHOUT COMPLICATION, WITHOUT LONG-TERM CURRENT USE OF INSULIN (H): ICD-10-CM

## 2022-06-07 DIAGNOSIS — I10 ESSENTIAL HYPERTENSION: Primary | ICD-10-CM

## 2022-06-07 DIAGNOSIS — R06.09 DOE (DYSPNEA ON EXERTION): ICD-10-CM

## 2022-06-07 DIAGNOSIS — R53.83 FATIGUE, UNSPECIFIED TYPE: ICD-10-CM

## 2022-06-07 DIAGNOSIS — R07.9 CHEST PAIN, UNSPECIFIED TYPE: ICD-10-CM

## 2022-06-07 DIAGNOSIS — E11.69 HYPERLIPIDEMIA ASSOCIATED WITH TYPE 2 DIABETES MELLITUS (H): ICD-10-CM

## 2022-06-07 DIAGNOSIS — E78.5 HYPERLIPIDEMIA ASSOCIATED WITH TYPE 2 DIABETES MELLITUS (H): ICD-10-CM

## 2022-06-07 LAB
ALBUMIN SERPL-MCNC: 3.8 G/DL (ref 3.4–5)
ALP SERPL-CCNC: 57 U/L (ref 40–150)
ALT SERPL W P-5'-P-CCNC: 56 U/L (ref 0–70)
ANION GAP SERPL CALCULATED.3IONS-SCNC: 6 MMOL/L (ref 3–14)
AST SERPL W P-5'-P-CCNC: 43 U/L (ref 0–45)
BASOPHILS # BLD AUTO: 0 10E3/UL (ref 0–0.2)
BASOPHILS NFR BLD AUTO: 1 %
BILIRUB SERPL-MCNC: 0.7 MG/DL (ref 0.2–1.3)
BUN SERPL-MCNC: 13 MG/DL (ref 7–30)
CALCIUM SERPL-MCNC: 8.9 MG/DL (ref 8.5–10.1)
CHLORIDE BLD-SCNC: 102 MMOL/L (ref 94–109)
CHOLEST SERPL-MCNC: 133 MG/DL
CO2 SERPL-SCNC: 29 MMOL/L (ref 20–32)
CREAT SERPL-MCNC: 0.88 MG/DL (ref 0.66–1.25)
EOSINOPHIL # BLD AUTO: 0.2 10E3/UL (ref 0–0.7)
EOSINOPHIL NFR BLD AUTO: 4 %
ERYTHROCYTE [DISTWIDTH] IN BLOOD BY AUTOMATED COUNT: 14.8 % (ref 10–15)
EST. AVERAGE GLUCOSE BLD GHB EST-MCNC: 140 MG/DL
FASTING STATUS PATIENT QL REPORTED: NO
GFR SERPL CREATININE-BSD FRML MDRD: >90 ML/MIN/1.73M2
GLUCOSE BLD-MCNC: 97 MG/DL (ref 70–99)
HBA1C MFR BLD: 6.5 % (ref 0–5.6)
HCT VFR BLD AUTO: 46.2 % (ref 40–53)
HDLC SERPL-MCNC: 31 MG/DL
HGB BLD-MCNC: 14.9 G/DL (ref 13.3–17.7)
IMM GRANULOCYTES # BLD: 0 10E3/UL
IMM GRANULOCYTES NFR BLD: 0 %
LDLC SERPL CALC-MCNC: 67 MG/DL
LYMPHOCYTES # BLD AUTO: 1.3 10E3/UL (ref 0.8–5.3)
LYMPHOCYTES NFR BLD AUTO: 22 %
MCH RBC QN AUTO: 29.8 PG (ref 26.5–33)
MCHC RBC AUTO-ENTMCNC: 32.3 G/DL (ref 31.5–36.5)
MCV RBC AUTO: 92 FL (ref 78–100)
MONOCYTES # BLD AUTO: 0.5 10E3/UL (ref 0–1.3)
MONOCYTES NFR BLD AUTO: 9 %
NEUTROPHILS # BLD AUTO: 3.8 10E3/UL (ref 1.6–8.3)
NEUTROPHILS NFR BLD AUTO: 64 %
NONHDLC SERPL-MCNC: 102 MG/DL
NRBC # BLD AUTO: 0 10E3/UL
NRBC BLD AUTO-RTO: 0 /100
NT-PROBNP SERPL-MCNC: 125 PG/ML (ref 0–900)
PLATELET # BLD AUTO: 180 10E3/UL (ref 150–450)
POTASSIUM BLD-SCNC: 3.6 MMOL/L (ref 3.4–5.3)
PROT SERPL-MCNC: 8.2 G/DL (ref 6.8–8.8)
RBC # BLD AUTO: 5 10E6/UL (ref 4.4–5.9)
SODIUM SERPL-SCNC: 137 MMOL/L (ref 133–144)
TRIGL SERPL-MCNC: 174 MG/DL
TSH SERPL DL<=0.005 MIU/L-ACNC: 1.49 MU/L (ref 0.4–4)
WBC # BLD AUTO: 5.9 10E3/UL (ref 4–11)

## 2022-06-07 PROCEDURE — 85025 COMPLETE CBC W/AUTO DIFF WBC: CPT | Mod: ZL | Performed by: NURSE PRACTITIONER

## 2022-06-07 PROCEDURE — 80061 LIPID PANEL: CPT | Mod: ZL | Performed by: NURSE PRACTITIONER

## 2022-06-07 PROCEDURE — 80053 COMPREHEN METABOLIC PANEL: CPT | Mod: ZL | Performed by: NURSE PRACTITIONER

## 2022-06-07 PROCEDURE — 99215 OFFICE O/P EST HI 40 MIN: CPT | Performed by: NURSE PRACTITIONER

## 2022-06-07 PROCEDURE — 93005 ELECTROCARDIOGRAM TRACING: CPT | Performed by: NURSE PRACTITIONER

## 2022-06-07 PROCEDURE — 93010 ELECTROCARDIOGRAM REPORT: CPT | Mod: 77 | Performed by: INTERNAL MEDICINE

## 2022-06-07 PROCEDURE — 83880 ASSAY OF NATRIURETIC PEPTIDE: CPT | Mod: ZL | Performed by: NURSE PRACTITIONER

## 2022-06-07 PROCEDURE — 36415 COLL VENOUS BLD VENIPUNCTURE: CPT | Mod: ZL | Performed by: NURSE PRACTITIONER

## 2022-06-07 PROCEDURE — G0463 HOSPITAL OUTPT CLINIC VISIT: HCPCS | Mod: 25 | Performed by: NURSE PRACTITIONER

## 2022-06-07 PROCEDURE — 84443 ASSAY THYROID STIM HORMONE: CPT | Mod: ZL | Performed by: NURSE PRACTITIONER

## 2022-06-07 PROCEDURE — 83036 HEMOGLOBIN GLYCOSYLATED A1C: CPT | Mod: ZL | Performed by: NURSE PRACTITIONER

## 2022-06-07 PROCEDURE — G0463 HOSPITAL OUTPT CLINIC VISIT: HCPCS | Performed by: NURSE PRACTITIONER

## 2022-06-07 RX ORDER — EZETIMIBE 10 MG/1
10 TABLET ORAL DAILY
Qty: 90 TABLET | Refills: 0 | Status: SHIPPED | OUTPATIENT
Start: 2022-06-07 | End: 2023-02-07

## 2022-06-07 NOTE — NURSING NOTE
"Chief Complaint   Patient presents with     Diabetes     Lipids     Hypertension       Initial /76   Pulse 56   Temp 97.9  F (36.6  C) (Tympanic)   Resp 18   Wt 108.4 kg (239 lb)   SpO2 92%   BMI 33.33 kg/m   Estimated body mass index is 33.33 kg/m  as calculated from the following:    Height as of 2/15/22: 1.803 m (5' 11\").    Weight as of this encounter: 108.4 kg (239 lb).  Medication Reconciliation: complete  Lucita Bryant LPN  "

## 2022-06-10 ENCOUNTER — HOSPITAL ENCOUNTER (OUTPATIENT)
Dept: ULTRASOUND IMAGING | Facility: HOSPITAL | Age: 72
Discharge: HOME | End: 2022-06-10
Attending: NURSE PRACTITIONER | Admitting: NURSE PRACTITIONER
Payer: MEDICARE

## 2022-06-10 DIAGNOSIS — R42 DIZZINESS: ICD-10-CM

## 2022-06-10 PROCEDURE — 93880 EXTRACRANIAL BILAT STUDY: CPT

## 2022-06-17 DIAGNOSIS — E11.9 TYPE 2 DIABETES MELLITUS WITHOUT COMPLICATIONS (H): ICD-10-CM

## 2022-06-21 RX ORDER — BLOOD SUGAR DIAGNOSTIC
STRIP MISCELLANEOUS
Qty: 100 STRIP | Refills: 3 | Status: SHIPPED | OUTPATIENT
Start: 2022-06-21 | End: 2024-01-17

## 2022-06-21 NOTE — TELEPHONE ENCOUNTER
One touch ultra strip     Last Written Prescription Date:  Not on current Epic med list      Last Office Visit: 6.7.2022  Future Office visit:    Next 5 appointments (look out 90 days)    Jul 19, 2022  2:00 PM  (Arrive by 1:45 PM)  Return Visit with Shanice Robledo NP  [facility] [place] ([facility] [place] ) [address] [address]  [phone]   Sep 09, 2022  9:00 AM  (Arrive by 8:45 AM)  Office Visit with Usha Gray NP  [facility] [place] ([facility] [place] ) [address]  [place] [address]  [phone]           Routing refill request to provider for review/approval because:  Drug not on the [facility], [facility] or  Health refill protocol or controlled substance    Liana Woo RN

## 2022-07-05 ENCOUNTER — HOSPITAL ENCOUNTER (OUTPATIENT)
Dept: NUCLEAR MEDICINE | Facility: HOSPITAL | Age: 72
Setting detail: NUCLEAR MEDICINE
Discharge: HOME | End: 2022-07-05
Attending: NURSE PRACTITIONER
Payer: MEDICARE

## 2022-07-05 ENCOUNTER — HOSPITAL ENCOUNTER (OUTPATIENT)
Dept: CARDIOLOGY | Facility: HOSPITAL | Age: 72
Setting detail: NUCLEAR MEDICINE
Discharge: HOME | End: 2022-07-05
Attending: NURSE PRACTITIONER | Admitting: INTERNAL MEDICINE
Payer: MEDICARE

## 2022-07-05 DIAGNOSIS — R07.9 CHEST PAIN, UNSPECIFIED TYPE: ICD-10-CM

## 2022-07-05 PROCEDURE — A9500 TC99M SESTAMIBI: HCPCS | Performed by: RADIOLOGY

## 2022-07-05 PROCEDURE — 93017 CV STRESS TEST TRACING ONLY: CPT

## 2022-07-05 PROCEDURE — 93018 CV STRESS TEST I&R ONLY: CPT | Performed by: INTERNAL MEDICINE

## 2022-07-05 PROCEDURE — 78452 HT MUSCLE IMAGE SPECT MULT: CPT

## 2022-07-05 PROCEDURE — 343N000001 HC RX 343: Performed by: RADIOLOGY

## 2022-07-05 PROCEDURE — 93017 CV STRESS TEST TRACING ONLY: CPT | Performed by: INTERNAL MEDICINE

## 2022-07-05 PROCEDURE — 93016 CV STRESS TEST SUPVJ ONLY: CPT | Performed by: INTERNAL MEDICINE

## 2022-07-05 RX ADMIN — Medication 31.3 MILLICURIE: at 08:35

## 2022-07-05 RX ADMIN — Medication 10.9 MILLICURIE: at 06:47

## 2022-07-06 ENCOUNTER — TELEPHONE (OUTPATIENT)
Dept: FAMILY MEDICINE | Facility: OTHER | Age: 72
End: 2022-07-06

## 2022-07-06 DIAGNOSIS — R07.9 CHEST PAIN, UNSPECIFIED TYPE: ICD-10-CM

## 2022-07-06 DIAGNOSIS — R94.39 ABNORMAL STRESS TEST: Primary | ICD-10-CM

## 2022-07-06 LAB
CV BLOOD PRESSURE: 62 MMHG
CV STRESS MAX HR HE: 129
NUC STRESS EJECTION FRACTION: 52 %
RATE PRESSURE PRODUCT: NORMAL
STRESS ECHO BASELINE DIASTOLIC HE: 80
STRESS ECHO BASELINE HR: 73 BPM
STRESS ECHO BASELINE SYSTOLIC BP: 118
STRESS ECHO CALCULATED PERCENT HR: 87 %
STRESS ECHO LAST STRESS DIASTOLIC BP: 80
STRESS ECHO LAST STRESS SYSTOLIC BP: 144
STRESS ECHO POST EXERCISE DUR MIN: 5 MIN
STRESS ECHO POST EXERCISE DUR SEC: 30 SEC
STRESS ECHO TARGET HR: 149

## 2022-07-07 ENCOUNTER — DOCUMENTATION ONLY (OUTPATIENT)
Dept: SLEEP MEDICINE | Facility: HOSPITAL | Age: 72
End: 2022-07-07

## 2022-07-07 NOTE — PROGRESS NOTES
STOP BANG       Name: Scott Akhtar MRN# [id number]   Age: 72 year old YOB: 1950     Stop Bang questionnaire completed with a score of >3 to allow for HST     Have you been told you snore loudly (louder than talking or loud enough to be heard through doors)? NO    Do you often feel tired, fatigued, or sleepy during the daytime? YES    Has anyone observed you stop breathing during your sleep? NO    Do you have or are you being treated for high blood pressure? YES    Is your BMI greater than 35? NO    Is your neck size circumference 16 inches or greater? NO    Are you over 50 years old? YES    Stop Bang Score (# of yes): 4

## 2022-07-07 NOTE — PROGRESS NOTES
SLEEP HISTORY QUESTIONNAIRE    Please describe the main reason for your sleep appointment? Sleeping long hours and still tired    How long has this been a problem? ?    Have you been diagnosed with a sleep problem in the past? NO    If so, what? n/a    What treatment was recommended? n/a    Have you had a sleep study in the past? NO    If yes, where and when? n/a    Sleep Habits:   Do you read in bed? No  Do you eat in bed? No  Do you watch TV in bed? Yes  Do you work in bed? No  Do you use a phone or computer in bed? No    Is you sleep disturbed by:   Bed partner: Yes  Children: No  Noise: No   Pets: No  Other: ?      On two or more nights per week, do you drink alcohol to help you fall asleep?NO    On two or more nights per week, do you take melatonin to help you fall asleep? NO    On two or more nights per week, do you take over the counter medicine to fall asleep?  NO    Do you take drinks with caffeine (coffee, tea, soda, energy drinks)? NO    Do you have 3 or more caffeine drinks in a day? NO    Do you have caffeine drinks within 6 hours of bedtime? NO    Do you smoke or use tobacco? NO    Do you exercise? YES    Sleep Routine:   Using a 24 Hour Clock    What time do you usually get into bed on workdays? 11 pm    Weekend/non work days? 10 pm    What time do you get out of bed on workdays? 11 am      Weekend/non work days? 11 am    Do you work the evening or night shift or do your shifts rotate? NO    How long does it usually take to fall to sleep? 30 min    How many times do you wake during the night? 1-2    How much time do you feel that you are awake during the entire night? 30 min    How long does it take for you to fall back to sleep after you wake up? 5 min    Why do you think you wake up? To urinate    What do you do when you wake up? urinate    How much sleep do you think you get on work nights? 13 hours    How much sleep do you think you get on weekends/non work days? 13 hours    How much sleep do you  think you need to feel your best? 13 hours    How many days during a week do you take a nap on average? 0    What is the average length of your naps? n/a    Do you feel better after taking a nap? DOES NOT APPLY    If you could chose the best sleep schedule for you, what time would you go to bed? 10 pm  What time would you get up? 9 am    Do you read in bed? NO    Do you eat in bed? NO    Do you watch TV in bed? YES    Do you do work in bed? NO    Do you use a computer or phone in bed? NO    Sleep Disruptions?   Leg movements:  Do you ever have restless, crawling, aching or other unusual feelings in your legs? YES    Do you ever wake yourself by kicking your legs during the night? NO    Are the sheets and blankets messed up or tossed about when you get up? YES    Night-time behaviors:   Do you have nightmares or night terrors? NO   How often? n/a    Have you had times when you were sleep walking? NO    Have you been seen doing anything unusual while you sleep at nights? NO  What? n/a  How often? n/a    Have you ever hurt yourself or someone else while you were sleeping? NO  Please describe: n/a    Do you clench or grind your teeth during the night? no    Sleep Apnea (pauses in breathing during sleep):  Do you wake with a headache in the morning? NO  How often? n/a    Does your bed partner, family or friends ever say that you snore? YES  How many nights per week do you snore? ?  Can snoring be heard outside the bedroom? no    Do you ever wake yourself up from snoring, gasping or choking? NO    Have you ever been told that you stop breathing or have pauses in your breathing? NO    Do you wake in the morning with a dry throat or mouth? YES    Do you have trouble breathing through your nose? YES    Do you have problems with heartburn, reflux or a hiatal hernia? NO    Which positions do you usually sleep in? (stomach, back, sides, all) all    Do you use oxygen or any other medical equipment when you sleep? NO    Do  members of your family (related by blood) snore? YES    Have any members of your family been diagnosed with with sleep apnea? YES    Do other members of your family have restless leg? NO    Do other members of your family have sleep walking? NO    Have you ever had an accident, or near accident due to sleepiness while driving? NO    Does your sleepiness affect your work on the job or at school? NO    Do you ever fall asleep by accident while doing a task? NO    Have you had sudden muscle weakness when laughing, angry or surprised? NO    Have you ever been unable to move your body when falling asleep or waking up? NO    Do you ever have trouble  your dreams from real life events? NO  Please describe: n/a    Physical Health: (including illness and injury): During the past 30 days, on how many days was your physical health not good? 0/30 days     Mental Health: (including stress, depression, and problems with emotions): During the last 30 days, how may days was your mental health not good? 0/30 days.     During the past 30 days, on how many days did poor physical or mental health keep you from doing your usual activities? This might be self-care, work, or play? 0/30 days.     Social History:   Marital status:     Who lives in your home with you? wife    Mother (alive or dead)? dead If has , from what? suicide  Father (alive or dead)? dead If has , from what? Heart failure, old age    Siblings: YES  Have any ? YES  If so, from what? Airplane crash    Currently working? NO  If yes, work: n/a  Former jobs:      Sleepiness Scale:   Sitting and reading 0   Watching TV 0   Sitting in a public place 0   Riding in a car 0   Lying down to rest in the afternoon 0   Sitting and talking to someone 0   Sitting quietly after a lunch without alcohol 0   In a car, stopping for a few minutes in traffic 0       Surgical History:   Past Surgical History:   Procedure Laterality Date     ABDOMINAL  AORTIC ANEURYSM REPAIR       BACK SURGERY      laminectomy L5S1     COLONOSCOPY       COLONOSCOPY N/A 10/14/2016    Procedure: COLONOSCOPY;  Surgeon: Daljit Salazar MD;  Location: [facility]     FINGER GANGLION CYST EXCISION       L5 S1 Laminectomy  01/01/1991     ROTATOR CUFF REPAIR RT/LT  01/01/2006     SHOULDER SURGERY Right     replacement     TONSILLECTOMY  01/01/1971       Medical Conditions:   Past Medical History:   Diagnosis Date     Aneurysm of thoracic aorta (H)      Diabetes (H)      External thrombosed hemorrhoids      Hiatal hernia      Hypertension      Need for prophylactic vaccination and inoculation against unspecified single bacterial disease      Other chest pain        Medications:   Current Outpatient Medications   Medication Sig     ASPIRIN EC PO Take 81 mg by mouth daily     Cholecalciferol (VITAMIN D3 PO) Take 5,000 Units by mouth daily 2000 to 5000     ezetimibe (ZETIA) 10 MG tablet Take 1 tablet (10 mg) by mouth daily     hydrochlorothiazide (HYDRODIURIL) 25 MG tablet Take 1 tablet (25 mg) by mouth daily     loratadine (CLARITIN) 10 MG tablet Take 10 mg by mouth daily as needed      METFORMIN HCL PO Take 500 mg by mouth 2 times daily (with meals)      montelukast (SINGULAIR) 10 MG tablet Take 1 tablet (10 mg) by mouth At Bedtime     Multiple Minerals (CALCIUM-MAGNESIUM-ZINC) TABS Take 1 tablet by mouth daily     Omega-3 Fatty Acids (OMEGA-3 FISH OIL PO) Take 1,200 mg by mouth daily      ONETOUCH ULTRA test strip TEST ONCE A DAY     potassium chloride ER (KLOR-CON) 10 MEQ CR tablet Take 1 tablet (10 mEq) by mouth daily     pravastatin (PRAVACHOL) 10 MG tablet 1 tablet 2 times weekly. (Patient taking differently: 10 mg once a week Patient takes 1 tablet every Monday - 10 mg)     UNKNOWN TO PATIENT Patient takes an allergy medication.  Unknown name     No current facility-administered medications for this visit.       Are you currently having any of the following symptoms?   General:    Obvious weight gain or loss YES  Fever, chills or sweats YES  Drug allergies: statins    Eyes:   Changes in vision YES  Blind spots YES  Double vision YES  Other bluriness    Ear, Nose and Throat:   Ear pain YES  Sore throat NO  Sinus pain NO  Post-nasal drip YES  Runny nose YES  Bloody nose NO    Heart:   Rapid or irregular heart beat YES  Chest pain or pressure YES  Out of breath when lying down YES  Swelling in feet or legs NO  High blood pressure YES  Heart disease ?    Nervous system   Headaches YES  Weakness in arms or legs YES  Numbness in arms of legs YES  Other: ?    Skin  Rashes YES  New moles or skin changes YES  Other ?    Lungs  Shortness of breath at rest YES  Shortness of breath with activity YES  Dry cough YES  Coughing up mucous or phlegm YES  Coughing up blood NO  Wheezing when breathing NO    Lymph System  Swollen lymph nodes NO  New lumps or bumps NO  Changes in breasts or discharge NO    Digestive System   Nausea or vomiting YES  Loose or watery stools NO  Hard, dry stools (constipation) YES  Fat or grease in stools NO  Blood in stools NO  Stools are black or bloody NO  Abdominal (belly) pain YES    Urinary Tract   Pain when you urinate (pee) NO  Blood in your urine NO  Urinate (pee) more than normal NO  Irregular periods NO    Muscles and bones   Muscle pain YES  Joint or bone pain YES  Swollen joints YES  Other ?    Glands  Increased thirst or urination NO  Diabetes YES  Morning glucose: 135  Afternoon glucose: 160    Mental Health  Depression NO  Anxiety NO  Other mental health issues: no

## 2022-07-08 NOTE — PROGRESS NOTES
Chart review prior to sleep testing.    Patient Summary:  72 year old yo male who is referred for daytime fatigue.    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     Priority: Medium     Presence of right artificial shoulder joint 06/15/2021     Priority: Medium     Adverse effect of antihyperlipidemic drug, sequela 12/30/2020     Priority: Medium     Hepatic steatosis 08/17/2020     Priority: Medium     Hyperlipidemia associated with type 2 diabetes mellitus (H) 08/03/2020     Priority: Medium     PAD (peripheral artery disease) (H) 01/31/2020     Priority: Medium     Disorder of joint prosthesis (H) 02/28/2019     Priority: Medium     Overview:   Added automatically from request for surgery [id number]       Lumbar radiculopathy 10/06/2017     Priority: Medium     Muscle weakness 10/06/2017     Priority: Medium     History of repair of aneurysm of abdominal aorta using endovascular stent graft 01/26/2017     Priority: Medium     Presence of other vascular implants and grafts 01/26/2017     Priority: Medium     Gout 08/04/2016     Priority: Medium     Obesity with body mass index 30 or greater 07/28/2016     Priority: Medium     Controlled type 2 diabetes mellitus without complication, without long-term current use of insulin (H) 07/27/2016     Priority: Medium     4/15/13:  A1c 6.5  10/13/2020- Diabetes without diabetic retinopathy of both eyes.  Overview:   Overview:   4/15/13:  A1c 6.5       Dermatitis herpetiformis 07/27/2016     Priority: Medium     Overview:   Diagnosed while in the .  Started after visiting [place] in 1971.  Treats with dapsone four times a week; when he cuts down on the dose he gets a breakout of little water blisters on the backs of his hands, elbows, and knees.  Overview:   Overview:   Diagnosed while in the .  Started after visiting [place] in 1971.  Treats with dapsone four times a week; when he cuts down on the dose he  "gets a breakout of little water blisters on the backs of his hands, elbows, and knees.       Essential hypertension 07/27/2016     Priority: Medium     Degenerative scoliosis in adult patient 07/27/2016     Priority: Medium     Sensorineural hearing loss (SNHL) 06/26/2013     Priority: Medium     Diaphragmatic hernia without obstruction or gangrene 12/19/2012     Priority: Medium     Overview:   Overview:   12/19/12, \"small esophageal hiatal hernia\" noted on CT abdomen/pelvis.       Gastroesophageal reflux disease without esophagitis 01/13/2005     Priority: Medium       Current Outpatient Medications   Medication     ASPIRIN EC PO     Cholecalciferol (VITAMIN D3 PO)     ezetimibe (ZETIA) 10 MG tablet     hydrochlorothiazide (HYDRODIURIL) 25 MG tablet     loratadine (CLARITIN) 10 MG tablet     METFORMIN HCL PO     montelukast (SINGULAIR) 10 MG tablet     Multiple Minerals (CALCIUM-MAGNESIUM-ZINC) TABS     Omega-3 Fatty Acids (OMEGA-3 FISH OIL PO)     ONETOUCH ULTRA test strip     potassium chloride ER (KLOR-CON) 10 MEQ CR tablet     pravastatin (PRAVACHOL) 10 MG tablet     UNKNOWN TO PATIENT     No current facility-administered medications for this visit.       Pertinent PMHx of hiatal hernia (small), NAFLD, DM II, HTN, dermatitis herpetiformis, obesity.    STOP-BANG score of 4, with unknown neck circumference.  [place] score of 0.  BMI of Estimated body mass index is 32.08 kg/m  as calculated from the following:    Height as of 2/15/22: 1.803 m (5' 11\").    Weight as of 6/7/22: 104.3 kg (230 lb).     Per questionnaire: \"Sleeping long hours and still tired\"    Unknown duration of symptoms.    No prior sleep testing.    Caffeine use:  No for 3+ per day.  No for within 6 hours of bed.    Tobacco use: No    Sleep pattern:  Workdays.  11pm - 11am, total sleep time 13 hours.  Weekends.  10pm - 11am, total sleep time 13 hours.  Time to fall asleep: ~30 minutes.  Awakenings: 1-2 times per night, 5 minutes to return to " sleep, awake for total of 30 minutes per night.  Napping.  0 days per week, - hours per nap.    Yes for RLS screen.  No for sleep walking.  No for dream enactment behavior.  No for bruxism.    No for morning headaches.  Yes for snoring.  No for observed apnea.  Yes for FHx of RAYSA.    Negative narcolepsy triad.    SHx:  , lives with wife.  Previously employed as     A/P:    1.)  High likelihood of RAYSA with STOP-BANG score of 4.  2.)  Long sleep need  - Given unclear duration of symptoms, it is possible that idiopathic hypersomnia with long sleep time is part of differential, though RAYSA would be a more likely concern.   - Would appear to be candidate for either home sleep testing or in-lab PSG.    ---  This note was written with the assistance of the Dragon voice-dictation technology software. The final document, although reviewed, may contain errors. For corrections, please contact the office.    Clif Tee MD    Sleep Medicine  [facility] [place], [place]  ([phone])  [facility] [place], [place]  ([phone])  [facility] [place], [place] ([phone])

## 2022-07-19 ENCOUNTER — OFFICE VISIT (OUTPATIENT)
Dept: OTOLARYNGOLOGY | Facility: OTHER | Age: 72
End: 2022-07-19
Attending: NURSE PRACTITIONER
Payer: MEDICARE

## 2022-07-19 VITALS
SYSTOLIC BLOOD PRESSURE: 125 MMHG | HEIGHT: 69 IN | HEART RATE: 72 BPM | WEIGHT: 230 LBS | OXYGEN SATURATION: 93 % | BODY MASS INDEX: 34.07 KG/M2 | DIASTOLIC BLOOD PRESSURE: 66 MMHG | TEMPERATURE: 97.7 F

## 2022-07-19 DIAGNOSIS — R05.9 COUGH: Primary | ICD-10-CM

## 2022-07-19 PROCEDURE — 99213 OFFICE O/P EST LOW 20 MIN: CPT | Mod: 25 | Performed by: NURSE PRACTITIONER

## 2022-07-19 PROCEDURE — G0463 HOSPITAL OUTPT CLINIC VISIT: HCPCS

## 2022-07-19 PROCEDURE — 31575 DIAGNOSTIC LARYNGOSCOPY: CPT | Performed by: NURSE PRACTITIONER

## 2022-07-19 PROCEDURE — G0463 HOSPITAL OUTPT CLINIC VISIT: HCPCS | Mod: 25

## 2022-07-19 ASSESSMENT — PAIN SCALES - GENERAL: PAINLEVEL: NO PAIN (0)

## 2022-07-19 NOTE — PROGRESS NOTES
Otolaryngology Note         Chief Complaint:     Patient presents with:  Follow Up: Cough           History of Present Illness:     Scott Akhtar is a 72 year old male seen today for follow up cough.      I last saw Scott on 5/17/2022, for concerns for cough.  He also reported allergy symptoms of tickle in his throat, sneezing, itchy watery eyes.  Occasional wheezing and shortness of breath.    Inhalant RAST panel was completed.  Total IgE 102.11.  Grass was positive for moderate allergy, the rest was negative.    He was started on Singulair, and recommended to continue Zyrtec as he was previously taking.    Today he reports: He used the montelukast and did not note any improvement in symptoms with using.  He continues with cough.  The cough continues with a dry tickle cough.  He does hear himself wheeze.      He has shortness of breath with exertion.  He also has some dizziness with exertion, especially in warm conditions.  He denies chest pain with exertion.  He is currently being worked up for cardiac concerns.  He also feels he wakes up at night with trouble catching his breath.       He is scheduled for a home sleep study upcoming.      He notes with the mask the cough gets more irritated.       No PFT completed    No recent chest x-ray or CT on file    He has some coughing and sneezing fits.  At times it feels like he starts sneezing when he looks at this phone.  Concerns for light or ink sensitivity.     He has minimal reflux symptoms    He has intermittent nausea prior to eating, reports he fees better after eating a cold apple or orange.      No vomiting.  + headaches in temple area.  Very slight and every other day, last 5-10 minutes and are gone.      Headache worse in the mornings and have been stable for many months.           Medications:     Current Outpatient Rx   Medication Sig Dispense Refill     ASPIRIN EC PO Take 81 mg by mouth daily       Cholecalciferol (VITAMIN D3 PO) Take 5,000 Units by  mouth daily 2000 to 5000       ezetimibe (ZETIA) 10 MG tablet Take 1 tablet (10 mg) by mouth daily 90 tablet 0     hydrochlorothiazide (HYDRODIURIL) 25 MG tablet Take 1 tablet (25 mg) by mouth daily 90 tablet 1     loratadine (CLARITIN) 10 MG tablet Take 10 mg by mouth daily as needed        METFORMIN HCL PO Take 500 mg by mouth 2 times daily (with meals)        Multiple Minerals (CALCIUM-MAGNESIUM-ZINC) TABS Take 1 tablet by mouth daily       Omega-3 Fatty Acids (OMEGA-3 FISH OIL PO) Take 1,200 mg by mouth daily        ONETOUCH ULTRA test strip TEST ONCE A  strip 3     potassium chloride ER (KLOR-CON) 10 MEQ CR tablet Take 1 tablet (10 mEq) by mouth daily 10 tablet 0     pravastatin (PRAVACHOL) 10 MG tablet 1 tablet 2 times weekly. (Patient taking differently: 10 mg once a week Patient takes 1 tablet every Monday - 10 mg) 24 tablet 3     montelukast (SINGULAIR) 10 MG tablet Take 1 tablet (10 mg) by mouth At Bedtime (Patient not taking: Reported on 7/19/2022) 30 tablet 1     UNKNOWN TO PATIENT Patient takes an allergy medication.  Unknown name              Allergies:     Allergies: Pseudoephedrine hcl, Simvastatin, Sulfa drugs, and Chinese ink          Past Medical History:     Past Medical History:   Diagnosis Date     Aneurysm of thoracic aorta (H)      Diabetes (H)      External thrombosed hemorrhoids      Hiatal hernia      Hypertension      Need for prophylactic vaccination and inoculation against unspecified single bacterial disease      Other chest pain             Past Surgical History:     Past Surgical History:   Procedure Laterality Date     ABDOMINAL AORTIC ANEURYSM REPAIR       BACK SURGERY      laminectomy L5S1     COLONOSCOPY       COLONOSCOPY N/A 10/14/2016    Procedure: COLONOSCOPY;  Surgeon: Daljit Salazar MD;  Location: [facility]     FINGER GANGLION CYST EXCISION       L5 S1 Laminectomy  01/01/1991     ROTATOR CUFF REPAIR RT/LT  01/01/2006     SHOULDER SURGERY Right     replacement      "TONSILLECTOMY  1971       ENT family history reviewed         Social History:     Social History     Tobacco Use     Smoking status: Former Smoker     Types: Cigarettes     Quit date: 1985     Years since quittin.5     Smokeless tobacco: Never Used   Substance Use Topics     Alcohol use: Yes     Alcohol/week: 0.0 standard drinks     Comment: 3 beers daily      Drug use: Never     Comment: medical marijuana            Review of Systems:     ROS: See HPI         Physical Exam:     /66 (BP Location: Right arm, Patient Position: Sitting, Cuff Size: Adult Regular)   Pulse 72   Temp 97.7  F (36.5  C) (Tympanic)   Ht 1.753 m (5' 9\")   Wt 104.3 kg (230 lb)   SpO2 93%   BMI 33.97 kg/m      General - The patient is well nourished and well developed, and appears to have good nutritional status.  Alert and oriented to person and place, answers questions and cooperates with examination appropriately.   Head and Face - Normocephalic and atraumatic, with no gross asymmetry noted.  The facial nerve is intact, with strong symmetric movements.  Voice and Breathing - The patient was breathing comfortably without the use of accessory muscles. There was no wheezing, stridor. The patients voice was clear and strong, and had appropriate pitch and quality.  Ears - External ear normal. Canals are patent. Right tympanic membrane is intact without effusion, retraction or mass. Left tympanic membrane is intact without effusion, retraction or mass.  Eyes - Extraocular movements intact, sclera were not icteric or injected.  Mouth - Examination of the oral cavity showed pink, healthy oral mucosa. Dentition in good condition. No lesions or ulcerations noted. The tongue was mobile and midline.   Throat - The walls of the oropharynx were smooth, pink, moist, symmetric, and had no lesions or ulcerations.  The tonsillar pillars and soft palate were symmetric. The uvula was midline on elevation.    Neck - Normal midline " excursion of the laryngotracheal complex during swallowing.  Full range of motion on passive movement.  Palpation of the occipital, submental, submandibular, internal jugular chain, and supraclavicular nodes did not demonstrate any abnormal lymph nodes or masses.  Palpation of the thyroid was soft and smooth, with no nodules or goiter appreciated.  The trachea was mobile and midline.  Nose - External contour is symmetric, no gross deflection or scars.  Nasal mucosa is pink and moist with no abnormal mucus.  The septum and turbinates were evaluated with nasal speculum.  No polyps, masses, or purulence noted on examination.    Lungs are clear to auscultation bilaterally  Heart-S1-S2 regular with an occasional skipped beat    Attempts at mirror laryngoscopy were not possible due to gag reflex.  Therefore I proceeded with a fiberoptic examination after informed consent.  First I sprayed both sides of the nose with a mixture of lidocaine and neosynephrine.  I then passed the scope through the nasal cavity.     The nasopharynx was mucosally covered and symmetric.  The eustachian tube openings were unobstructed.  Going further down I had a clear view of the base of tongue which had normal appearing lingual tonsillar tissue.  The base of tongue was free of lesions, and the vallecula was open.  The epiglottis was smooth and mucosally covered.  The supraglottic larynx was then clearly visualized.  The vocal cords moved smoothly and symmetrically and were pearly white.  The pyriform sinuses were open and without clare mass or pooling of secretions upon valsalva, and the limited view of the postcricoid region did not show any lesions.  The patient tolerated the procedure well.         Assessment and Plan:       ICD-10-CM    1. Cough  R05.9      Discussed that cough is likely multifactorial.  Lungs clear today, no masses or abnormality noted on flexible laryngoscopy.    Continue cardiac work up with PCP and or Cardiology.     Increase zyrtec to twice daily  Follow up in 3-4 months for recheck    Shanice COCHRAN  [facility]

## 2022-07-19 NOTE — NURSING NOTE
"Chief Complaint   Patient presents with     Follow Up     Cough       Initial /66 (BP Location: Right arm, Patient Position: Sitting, Cuff Size: Adult Regular)   Pulse 72   Temp 97.7  F (36.5  C) (Tympanic)   Ht 1.753 m (5' 9\")   Wt 104.3 kg (230 lb)   SpO2 93%   BMI 33.97 kg/m   Estimated body mass index is 33.97 kg/m  as calculated from the following:    Height as of this encounter: 1.753 m (5' 9\").    Weight as of this encounter: 104.3 kg (230 lb).  Medication Reconciliation: complete  Merlyn Porter LPN    "

## 2022-07-19 NOTE — PATIENT INSTRUCTIONS
Thank you for allowing Shanice Robledo and our ENT team to participate in your care.  If your medications are too expensive, please give the nurse a call.  We can possibly change this medication.  If you have a scheduling or an appointment question please contact our Health Unit Coordinator at their direct line [phone].   ALL nursing questions or concerns can be directed to your ENT nurse at: [phone] - Merlyn     Increase Zyrtec for  2-5 weeks     Follow up 3-4 months to recheck cough and ears.

## 2022-07-19 NOTE — LETTER
7/19/2022         RE: Scott Akhtar  [address] [facility] [address]        Dear Colleague,    Thank you for referring your patient, Scott Akhtar, to the [facility]. Please see a copy of my visit note below.    Otolaryngology Note         Chief Complaint:     Patient presents with:  Follow Up: Cough           History of Present Illness:     Scott Akhtar is a 72 year old male seen today for follow up cough.      I last saw Scott on 5/17/2022, for concerns for cough.  He also reported allergy symptoms of tickle in his throat, sneezing, itchy watery eyes.  Occasional wheezing and shortness of breath.    Inhalant RAST panel was completed.  Total IgE 102.11.  Grass was positive for moderate allergy, the rest was negative.    He was started on Singulair, and recommended to continue Zyrtec as he was previously taking.    Today he reports: He used the montelukast and did not note any improvement in symptoms with using.  He continues with cough.  The cough continues with a dry tickle cough.  He does hear himself wheeze.      He has shortness of breath with exertion.  He also has some dizziness with exertion, especially in warm conditions.  He denies chest pain with exertion.  He is currently being worked up for cardiac concerns.  He also feels he wakes up at night with trouble catching his breath.       He is scheduled for a home sleep study upcoming.      He notes with the mask the cough gets more irritated.       No PFT completed    No recent chest x-ray or CT on file    He has some coughing and sneezing fits.  At times it feels like he starts sneezing when he looks at this phone.  Concerns for light or ink sensitivity.     He has minimal reflux symptoms    He has intermittent nausea prior to eating, reports he fees better after eating a cold apple or orange.      No vomiting.  + headaches in temple area.  Very slight and every other day, last 5-10 minutes and are gone.       Headache worse in the mornings and have been stable for many months.           Medications:     Current Outpatient Rx   Medication Sig Dispense Refill     ASPIRIN EC PO Take 81 mg by mouth daily       Cholecalciferol (VITAMIN D3 PO) Take 5,000 Units by mouth daily 2000 to 5000       ezetimibe (ZETIA) 10 MG tablet Take 1 tablet (10 mg) by mouth daily 90 tablet 0     hydrochlorothiazide (HYDRODIURIL) 25 MG tablet Take 1 tablet (25 mg) by mouth daily 90 tablet 1     loratadine (CLARITIN) 10 MG tablet Take 10 mg by mouth daily as needed        METFORMIN HCL PO Take 500 mg by mouth 2 times daily (with meals)        Multiple Minerals (CALCIUM-MAGNESIUM-ZINC) TABS Take 1 tablet by mouth daily       Omega-3 Fatty Acids (OMEGA-3 FISH OIL PO) Take 1,200 mg by mouth daily        ONETOUCH ULTRA test strip TEST ONCE A  strip 3     potassium chloride ER (KLOR-CON) 10 MEQ CR tablet Take 1 tablet (10 mEq) by mouth daily 10 tablet 0     pravastatin (PRAVACHOL) 10 MG tablet 1 tablet 2 times weekly. (Patient taking differently: 10 mg once a week Patient takes 1 tablet every Monday - 10 mg) 24 tablet 3     montelukast (SINGULAIR) 10 MG tablet Take 1 tablet (10 mg) by mouth At Bedtime (Patient not taking: Reported on 7/19/2022) 30 tablet 1     UNKNOWN TO PATIENT Patient takes an allergy medication.  Unknown name              Allergies:     Allergies: Pseudoephedrine hcl, Simvastatin, Sulfa drugs, and Chinese ink          Past Medical History:     Past Medical History:   Diagnosis Date     Aneurysm of thoracic aorta (H)      Diabetes (H)      External thrombosed hemorrhoids      Hiatal hernia      Hypertension      Need for prophylactic vaccination and inoculation against unspecified single bacterial disease      Other chest pain             Past Surgical History:     Past Surgical History:   Procedure Laterality Date     ABDOMINAL AORTIC ANEURYSM REPAIR       BACK SURGERY      laminectomy L5S1     COLONOSCOPY        "COLONOSCOPY N/A 10/14/2016    Procedure: COLONOSCOPY;  Surgeon: Daljit Salazar MD;  Location: [facility]     FINGER GANGLION CYST EXCISION       L5 S1 Laminectomy  1991     ROTATOR CUFF REPAIR RT/LT  2006     SHOULDER SURGERY Right     replacement     TONSILLECTOMY  1971       ENT family history reviewed         Social History:     Social History     Tobacco Use     Smoking status: Former Smoker     Types: Cigarettes     Quit date: 1985     Years since quittin.5     Smokeless tobacco: Never Used   Substance Use Topics     Alcohol use: Yes     Alcohol/week: 0.0 standard drinks     Comment: 3 beers daily      Drug use: Never     Comment: medical marijuana            Review of Systems:     ROS: See HPI         Physical Exam:     /66 (BP Location: Right arm, Patient Position: Sitting, Cuff Size: Adult Regular)   Pulse 72   Temp 97.7  F (36.5  C) (Tympanic)   Ht 1.753 m (5' 9\")   Wt 104.3 kg (230 lb)   SpO2 93%   BMI 33.97 kg/m      General - The patient is well nourished and well developed, and appears to have good nutritional status.  Alert and oriented to person and place, answers questions and cooperates with examination appropriately.   Head and Face - Normocephalic and atraumatic, with no gross asymmetry noted.  The facial nerve is intact, with strong symmetric movements.  Voice and Breathing - The patient was breathing comfortably without the use of accessory muscles. There was no wheezing, stridor. The patients voice was clear and strong, and had appropriate pitch and quality.  Ears - External ear normal. Canals are patent. Right tympanic membrane is intact without effusion, retraction or mass. Left tympanic membrane is intact without effusion, retraction or mass.  Eyes - Extraocular movements intact, sclera were not icteric or injected.  Mouth - Examination of the oral cavity showed pink, healthy oral mucosa. Dentition in good condition. No lesions or ulcerations noted. " The tongue was mobile and midline.   Throat - The walls of the oropharynx were smooth, pink, moist, symmetric, and had no lesions or ulcerations.  The tonsillar pillars and soft palate were symmetric. The uvula was midline on elevation.    Neck - Normal midline excursion of the laryngotracheal complex during swallowing.  Full range of motion on passive movement.  Palpation of the occipital, submental, submandibular, internal jugular chain, and supraclavicular nodes did not demonstrate any abnormal lymph nodes or masses.  Palpation of the thyroid was soft and smooth, with no nodules or goiter appreciated.  The trachea was mobile and midline.  Nose - External contour is symmetric, no gross deflection or scars.  Nasal mucosa is pink and moist with no abnormal mucus.  The septum and turbinates were evaluated with nasal speculum.  No polyps, masses, or purulence noted on examination.    Lungs are clear to auscultation bilaterally  Heart-S1-S2 regular with an occasional skipped beat    Attempts at mirror laryngoscopy were not possible due to gag reflex.  Therefore I proceeded with a fiberoptic examination after informed consent.  First I sprayed both sides of the nose with a mixture of lidocaine and neosynephrine.  I then passed the scope through the nasal cavity.     The nasopharynx was mucosally covered and symmetric.  The eustachian tube openings were unobstructed.  Going further down I had a clear view of the base of tongue which had normal appearing lingual tonsillar tissue.  The base of tongue was free of lesions, and the vallecula was open.  The epiglottis was smooth and mucosally covered.  The supraglottic larynx was then clearly visualized.  The vocal cords moved smoothly and symmetrically and were pearly white.  The pyriform sinuses were open and without clare mass or pooling of secretions upon valsalva, and the limited view of the postcricoid region did not show any lesions.  The patient tolerated the procedure  well.         Assessment and Plan:       ICD-10-CM    1. Cough  R05.9      Discussed that cough is likely multifactorial.  Lungs clear today, no masses or abnormality noted on flexible laryngoscopy.    Continue cardiac work up with PCP and or Cardiology.    Increase zyrtec to twice daily  Follow up in 3-4 months for recheck    Shanice COCHRAN  [facility]        Again, thank you for allowing me to participate in the care of your patient.        Sincerely,        Shanice Robledo NP

## 2022-07-21 ENCOUNTER — HOSPITAL ENCOUNTER (OUTPATIENT)
Dept: CARDIOLOGY | Facility: HOSPITAL | Age: 72
Discharge: HOME | End: 2022-07-21
Attending: NURSE PRACTITIONER | Admitting: INTERNAL MEDICINE
Payer: MEDICARE

## 2022-07-21 DIAGNOSIS — R94.39 ABNORMAL STRESS TEST: ICD-10-CM

## 2022-07-21 DIAGNOSIS — R07.9 CHEST PAIN, UNSPECIFIED TYPE: ICD-10-CM

## 2022-07-21 LAB — LVEF ECHO: NORMAL

## 2022-07-21 PROCEDURE — 93306 TTE W/DOPPLER COMPLETE: CPT | Mod: 26 | Performed by: INTERNAL MEDICINE

## 2022-07-21 PROCEDURE — 255N000002 HC RX 255 OP 636: Performed by: NURSE PRACTITIONER

## 2022-07-21 RX ADMIN — PERFLUTREN 2 ML: 6.52 INJECTION, SUSPENSION INTRAVENOUS at 09:45

## 2022-07-24 PROBLEM — I77.810 ASCENDING AORTA DILATATION (H): Status: ACTIVE | Noted: 2022-07-24

## 2022-07-25 ENCOUNTER — TELEPHONE (OUTPATIENT)
Dept: FAMILY MEDICINE | Facility: OTHER | Age: 72
End: 2022-07-25

## 2022-07-25 ENCOUNTER — OFFICE VISIT (OUTPATIENT)
Dept: SLEEP MEDICINE | Facility: HOSPITAL | Age: 72
End: 2022-07-25
Attending: FAMILY MEDICINE
Payer: MEDICARE

## 2022-07-25 DIAGNOSIS — G47.33 OSA (OBSTRUCTIVE SLEEP APNEA): Primary | ICD-10-CM

## 2022-07-25 PROCEDURE — 95800 SLP STDY UNATTENDED: CPT

## 2022-07-25 PROCEDURE — 95800 SLP STDY UNATTENDED: CPT | Mod: 26 | Performed by: FAMILY MEDICINE

## 2022-07-25 NOTE — TELEPHONE ENCOUNTER
----- Message from Usha Gray NP sent at 7/25/2022  8:05 AM CDT -----  Alberto Linder. Can you let pt know that I want to repeat an echo in 6 months to be sure his aorta remains stable. If willing, pend echo. Diag: aortic dilation

## 2022-07-27 NOTE — PROGRESS NOTES
Patient was provided both verbal and written education and instructions on use of Watch PAT device. Watch PAT device has been registered and shipped via [employer] on 7/27/2022. Patient was notified that package was mailed out. Watch Dr[employer] Smooth Move serial number: [id number].    Tracking # [id number]

## 2022-08-01 ENCOUNTER — DOCUMENTATION ONLY (OUTPATIENT)
Dept: PULMONOLOGY | Facility: OTHER | Age: 72
End: 2022-08-01

## 2022-08-01 NOTE — PROGRESS NOTES
WatchPAT data has been received and has been scored using rule 1B, 4%. Patient to follow up with provider to determine appropriate therapy.    Pat AHI: 5    Ordering Provider: Dr Clif Tee      Sleep Technician/Technologist: JACKSON SR

## 2022-08-01 NOTE — PROCEDURES
"WatchPAT - HOME SLEEP STUDY INTERPRETATION    Patient: Scott Akhtar  MRN: [id number]  YOB: 1950  Study Date: 2022  Referring Provider: Usha Gray  Ordering Provider: Clif Tee MD, MD    Chain of custody patient verification was not enabled.       Indications for Home Study: Scott Akhtar is a 72 year old male with a history of hiatal hernia (small), NAFLD, DM II, HTN, dermatitis herpetiformis, obesity who presents with symptoms suggestive of obstructive sleep apnea.    Estimated body mass index is 33.97 kg/m  as calculated from the following:    Height as of 22: 1.753 m (5' 9\").    Weight as of 22: 104.3 kg (230 lb).  [place] Sleepiness Scale: 024  STOP-BAN/8    Data: A full night home sleep study was performed recording the standard physiologic parameters including peripheral arterial tonometry (PAT), sound/snoring, body position,  movement, sound, and oxygen saturation by pulse oximetry. Pulse rate was estimated by oximetry recording. Sleep staging (wake, REM, light, and deep sleep) was derived from PAT signal.  This study was considered adequate based on > 4 hours of quality oximetry and respiratory recording. As specified by the AASM Manual for the Scoring of Sleep and Associated events, version 2.3, Rule VIII.D 1B, 4% oxygen desaturation scoring for hypopneas is used as a standard of care on all home sleep apnea testing.    Total Recording Time: 9 hrs, 59 min  Total Sleep Time: 8 hrs, 40 min  % of Sleep Time REM: 16.6%    Respiratory:  Snoring: Snoring was present, largely confined to supine sleep position.  Respiratory events: The PAT respiratory disturbance index [pRDI] was 8.2 events per hour.  The PAT apnea/hypopnea index [pAHI] was 5 events per hour.  MAGNO was 2.8 events per hour.  During REM sleep the pAHI was 4.9.  Sleep Associated Hypoxemia: sustained hypoxemia was not present. Mean oxygen saturation was 91%.  Minimum was 85%.  Time with " saturation less than 88% was 0.9 minutes.    Heart Rate: By pulse oximetry normal rate was noted.     Position: Percent of time spent: supine - 23.7%, prone - 0.2%, on right - 1.2%, on left - 74.9%.  pAHI was 16.2 per hour supine, - per hour prone, - per hour on right side, and 0.8 per hour on left side.     Assessment:   - Borderline mild obstructive sleep apnea.  - Strong positional component (supine pAHI 16.2, non-supine pAHI 1.5)  - Sleep associated hypoxemia was not present.    Recommendations:  Consider auto-CPAP at 5-15 cmH2O, oral appliance therapy, positional therapy or polysomnography with full night PAP titration.  Suggest optimizing sleep hygiene and avoiding sleep deprivation.  Weight management.    Diagnosis Code(s): Obstructive Sleep Apnea G47.33    Clif Tee MD, MD, August 1, 2022   Diplomate, American Board of Family Medicine, Sleep Medicine

## 2022-08-08 ENCOUNTER — OFFICE VISIT (OUTPATIENT)
Dept: PULMONOLOGY | Facility: OTHER | Age: 72
End: 2022-08-08
Attending: FAMILY MEDICINE
Payer: COMMERCIAL

## 2022-08-08 DIAGNOSIS — R53.82 CHRONIC FATIGUE: Primary | ICD-10-CM

## 2022-08-08 DIAGNOSIS — F51.04 PSYCHOPHYSIOLOGICAL INSOMNIA: ICD-10-CM

## 2022-08-08 PROCEDURE — 99204 OFFICE O/P NEW MOD 45 MIN: CPT | Performed by: FAMILY MEDICINE

## 2022-08-08 NOTE — PATIENT INSTRUCTIONS
"For our plan for you:    1.)  Plan to be in bed no earlier than 11pm and getting up no later than 9am.  Ok for naps, aim for 30 minutes or less.    2.)  Bed for sleep / sex only, ok for TV near bed, just not IN bed.    3.)  No clock or time visible from bed.    4.)  We will plan for a follow-up visit (ok for phone / video) in 4 weeks.    5.)  We will look at the position device / pillow to prevent back sleeping as a treatment for the sleep apnea.    Sleep Hannah Tee MD    Sleep Medicine  [place], [place]  ([phone])  [facility] [place], [place]  ([phone])  [place], [place] ([phone])        Sleep Hygiene / Stimulus Control / Sleep Restriction instructions    We discussed my \"three buckets\" of recommended behavioral changes for good sleep and to reduce insomnia.  The main goal of these recommendations is to help retrain your brain that when you are in bed, you are sleeping and if you are not sleeping then you are not in bed.    Bucket #1 - Sleep Hygiene  Use the bed only for sleep or sex.  Do not read, eat, watch TV, talk on the phone, etc. In bed.  Prefer bed room to be cool, dark, quiet, free of interruptions.    Bucket #2 - Stimulus Control  This is the \"what to do and what NOT to do when you awaken in the night\" bucket.  No clock or time telling device visible from bed.  IT is fine to have a clock / phone in the bedroom and to set an alarm to wake up to, however, you may want to turn it to the wall so you can not see it.  If you are awake in bed for what seems to be longer than 15 minutes, then leave the bed and the bedroom and do something quiet and relaxing, such as reading.  This activity should not reward you (i.e. Getting work done, chores, checking emails).  When you feel sleepy, you may return to bed.  Repeat if necessary.    Bucket #3 - " "Sleep Restriction  This is where we try to match the amount of time you spend in bed with what we believe is closest to your natural sleep need.  This counteracts our natural tendency to spend more time in bed to try and get \"more\" sleep, though it often actually makes it worse.  I.e. I was spending 8 hours in bed to get 4 hours of sleep, now I am spending 10 hours in bed to get 4 hours of sleep.  I would recommend try to make your bed time slightly later by 15-30 minutes and being very consistent from night to night.  Avoid daytime napping.    "

## 2022-08-08 NOTE — PROGRESS NOTES
In-person visit for chronic daytime fatigue, irregular sleep-wake pattern, potential longer sleep need.     A/P:     1.)  Borderline mild obstructive sleep apnea (pAHI 5)  - Strong positional component (supine pAHI 16.2, non-supine pAHI 1.5)  - Sleep associated hypoxemia was not present.  - We discussed treatment options including CPAP, positional therapy.  Plan to start with attempt at positional therapy.    2.)  Chronic daytime fatigue  3.)  Irregular sleep-wake pattern with unclear total sleep time    Overall, it is possible that mild obstructive sleep apnea may be playing part of his symptoms, though I suspect it is more related to his irregular sleep-wake pattern with components of psychophysiological insomnia, reduce constraints on time.  My basis for the suspicion is that his symptoms seem to be primarily noticed after correction and significant changes in his sleep-wake pattern.    So we spent the majority of our visit reviewing behavioral modification for chronic insomnia including sleep hygiene, stimulus control, sleep restriction (mild, currently working with a total sleep time of 10 hours).  Our main focus is on improving sleep hygiene given he was watching TV in bed for multiple hours, stimulus control given frequent clock watching and spending excessive time in bed during awakenings, and establishing a very concrete sleep-wake pattern with in bed no earlier than 11 PM and in bed no later than 9 AM.    Plan for follow-up in 1 month.  If no significant provement, we may consider a more definitive treatment for obstructive sleep apnea with consideration for CPAP, if sleep-wake pattern is still unclear, will likely consider activity to get some sort of objective picture.    SUBJECTIVE:  Scott Akhtar is a 72 year old male.    72 year old yo male who is referred for daytime fatigue.    Pertinent PMHx of hiatal hernia (small), NAFLD, DM II, HTN, dermatitis herpetiformis, obesity.     Today -we reviewed  "his sleep history.  Initially, he reports that he sleeps 13 hours a day and still feels quite tired.  But as we went through a more detailed history about his sleep-wake pattern, I feel that this is likely not the case.  He has a fairly irregular sleep-wake pattern.  He will get in to bed anywhere from 9-10 PM or as late as possibly to 3 in the morning.  He will often watch TV for multiple hours in bed, timing of sleep onset may be difficult to assess though he does often look at the clock.  He also notes frequent awakenings during the night to urinate, here he will often take potentially multiple hours to return to sleep and is often watching the clock.  During this time he is usually laying in the dark and having his mind racing.  He will awaken naturally the morning anywhere from 9 AM to potentially after noon.  He denies taking regular naps, and sounds as if even if he did try that he would be unlikely to fall asleep.    While he feels he has had some fatigue for many years, most of his sleep concerns seem to have exacerbated since his custodial 6-7 years ago.  He used to work in construction and had a quite regular sleep-wake pattern of going to bed and fall asleep between 8-10 PM and awakening between 5-6 AM on workdays, though he would often sleep later on the weekends.    Per his wife, his snoring and observed apnea have reduced over time.    He does use [place] medical prove marijuana with 5-CBD and 5-THC, 2-3 times per week as an oral spray, usually between 5-7 PM.  Caffeine use is 1-2 cups of coffee in the morning.  Alcohol use is 1-2 drinks on most nights usually around 6 PM.  He denies any nicotine use.    Normal CBC, CMP, TSH on 6/7/2022.    STOP-BANG score of 4, with unknown neck circumference.  Mobile score of 0.  BMI of Estimated body mass index is 32.08 kg/m  as calculated from the following:    Height as of 2/15/22: 1.803 m (5' 11\").    Weight as of 6/7/22: 104.3 kg (230 lb).      Per " "questionnaire: \"Sleeping long hours and still tired\"     Unknown duration of symptoms.     No prior sleep testing.     Caffeine use:  No for 3+ per day.  No for within 6 hours of bed.     Tobacco use: No     Sleep pattern:  Workdays.  11pm - 11am, total sleep time 13 hours.  Weekends.  10pm - 11am, total sleep time 13 hours.  Time to fall asleep: ~30 minutes.  Awakenings: 1-2 times per night, 5 minutes to return to sleep, awake for total of 30 minutes per night.  Napping.  0 days per week, - hours per nap.     Yes for RLS screen.  No for sleep walking.  No for dream enactment behavior.  No for bruxism.     No for morning headaches.  Yes for snoring.  No for observed apnea.  Yes for FHx of RAYSA.     Negative narcolepsy triad.     SHx:  , lives with wife.  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       Indications for Home Study: Scott Akhtar is a 72 year old male with a history of hiatal hernia (small), NAFLD, DM II, HTN, dermatitis herpetiformis, obesity who presents with symptoms suggestive of obstructive sleep apnea.     Estimated body mass index is 33.97 kg/m  as calculated from the following:    Height as of 22: 1.753 m (5' 9\").    Weight as of 22: 104.3 kg (230 lb).  [place] Sleepiness Scale: 024  STOP-BAN/8     Data: A full night home sleep study was performed recording the standard physiologic parameters including peripheral arterial tonometry (PAT), sound/snoring, body position,  movement, sound, and oxygen saturation by pulse oximetry. Pulse rate was estimated by oximetry recording. Sleep staging (wake, REM, light, and deep sleep) was derived from PAT signal.  This study was considered adequate " based on > 4 hours of quality oximetry and respiratory recording. As specified by the AASM Manual for the Scoring of Sleep and Associated events, version 2.3, Rule VIII.D 1B, 4% oxygen desaturation scoring for hypopneas is used as a standard of care on all home sleep apnea testing.     Total Recording Time: 9 hrs, 59 min  Total Sleep Time: 8 hrs, 40 min  % of Sleep Time REM: 16.6%     Respiratory:  Snoring: Snoring was present, largely confined to supine sleep position.  Respiratory events: The PAT respiratory disturbance index [pRDI] was 8.2 events per hour.  The PAT apnea/hypopnea index [pAHI] was 5 events per hour.  MAGNO was 2.8 events per hour.  During REM sleep the pAHI was 4.9.  Sleep Associated Hypoxemia: sustained hypoxemia was not present. Mean oxygen saturation was 91%.  Minimum was 85%.  Time with saturation less than 88% was 0.9 minutes.     Heart Rate: By pulse oximetry normal rate was noted.      Position: Percent of time spent: supine - 23.7%, prone - 0.2%, on right - 1.2%, on left - 74.9%.  pAHI was 16.2 per hour supine, - per hour prone, - per hour on right side, and 0.8 per hour on left side.      Assessment:   - Borderline mild obstructive sleep apnea.  - Strong positional component (supine pAHI 16.2, non-supine pAHI 1.5)  - Sleep associated hypoxemia was not present.     Recommendations:  Consider auto-CPAP at 5-15 cmH2O, oral appliance therapy, positional therapy or polysomnography with full night PAP titration.  Suggest optimizing sleep hygiene and avoiding sleep deprivation.  Weight management.     Diagnosis Code(s): Obstructive Sleep Apnea G47.33     Clif Tee MD, MD, August 1, 2022   Diplomate, American Board of Family Medicine, Sleep Medicine    Past medical history:    Patient Active Problem List    Diagnosis Date Noted     Ascending aorta dilatation (H)-7/24/22-4.04, repeat echo 1/2023 07/24/2022     Priority: Medium     Cutaneous lupus erythematosus 11/30/2021     Priority:  Medium     Presence of right artificial shoulder joint 06/15/2021     Priority: Medium     Adverse effect of antihyperlipidemic drug, sequela 12/30/2020     Priority: Medium     Hepatic steatosis 08/17/2020     Priority: Medium     Hyperlipidemia associated with type 2 diabetes mellitus (H) 08/03/2020     Priority: Medium     PAD (peripheral artery disease) (H) 01/31/2020     Priority: Medium     Disorder of joint prosthesis (H) 02/28/2019     Priority: Medium     Overview:   Added automatically from request for surgery [id number]       Lumbar radiculopathy 10/06/2017     Priority: Medium     Muscle weakness 10/06/2017     Priority: Medium     History of repair of aneurysm of abdominal aorta using endovascular stent graft 01/26/2017     Priority: Medium     Presence of other vascular implants and grafts 01/26/2017     Priority: Medium     Gout 08/04/2016     Priority: Medium     Obesity with body mass index 30 or greater 07/28/2016     Priority: Medium     Controlled type 2 diabetes mellitus without complication, without long-term current use of insulin (H) 07/27/2016     Priority: Medium     4/15/13:  A1c 6.5  10/13/2020- Diabetes without diabetic retinopathy of both eyes.  Overview:   Overview:   4/15/13:  A1c 6.5       Dermatitis herpetiformis 07/27/2016     Priority: Medium     Overview:   Diagnosed while in the .  Started after visiting [facility] in 1971.  Treats with dapsone four times a week; when he cuts down on the dose he gets a breakout of little water blisters on the backs of his hands, elbows, and knees.  Overview:   Overview:   Diagnosed while in the .  Started after visiting [facility] in 1971.  Treats with dapsone four times a week; when he cuts down on the dose he gets a breakout of little water blisters on the backs of his hands, elbows, and knees.       Essential hypertension 07/27/2016     Priority: Medium     Degenerative scoliosis in adult patient 07/27/2016     Priority: Medium      "Sensorineural hearing loss (SNHL) 06/26/2013     Priority: Medium     Diaphragmatic hernia without obstruction or gangrene 12/19/2012     Priority: Medium     Overview:   Overview:   12/19/12, \"small esophageal hiatal hernia\" noted on CT abdomen/pelvis.       Gastroesophageal reflux disease without esophagitis 01/13/2005     Priority: Medium       10 point ROS of systems including Constitutional, Eyes, Respiratory, Cardiovascular, Gastroenterology, Genitourinary, Integumentary, Muscularskeletal, Psychiatric were all negative except for pertinent positives noted in my HPI.    Current Outpatient Medications   Medication Sig Dispense Refill     ASPIRIN EC PO Take 81 mg by mouth daily       Cholecalciferol (VITAMIN D3 PO) Take 5,000 Units by mouth daily 2000 to 5000       ezetimibe (ZETIA) 10 MG tablet Take 1 tablet (10 mg) by mouth daily 90 tablet 0     hydrochlorothiazide (HYDRODIURIL) 25 MG tablet Take 1 tablet (25 mg) by mouth daily 90 tablet 1     loratadine (CLARITIN) 10 MG tablet Take 10 mg by mouth daily as needed        METFORMIN HCL PO Take 500 mg by mouth 2 times daily (with meals)        montelukast (SINGULAIR) 10 MG tablet Take 1 tablet (10 mg) by mouth At Bedtime (Patient not taking: Reported on 7/19/2022) 30 tablet 1     Multiple Minerals (CALCIUM-MAGNESIUM-ZINC) TABS Take 1 tablet by mouth daily       Omega-3 Fatty Acids (OMEGA-3 FISH OIL PO) Take 1,200 mg by mouth daily        ONETOUCH ULTRA test strip TEST ONCE A  strip 3     potassium chloride ER (KLOR-CON) 10 MEQ CR tablet Take 1 tablet (10 mEq) by mouth daily 10 tablet 0     pravastatin (PRAVACHOL) 10 MG tablet 1 tablet 2 times weekly. (Patient taking differently: 10 mg once a week Patient takes 1 tablet every Monday - 10 mg) 24 tablet 3     UNKNOWN TO PATIENT Patient takes an allergy medication.  Unknown name         OBJECTIVE:  There were no vitals taken for this visit.    Physical Exam     ---  This note was written with the assistance of " the Dragon voice-dictation technology software. The final document, although reviewed, may contain errors. For corrections, please contact the office.    Clif Tee MD    Sleep Medicine  [facility]  - [place], [place]  ([phone])  [facility] [place], [place]  ([phone])  [place], [place] ([phone])    Time spent on the date of service:  45 minutes.

## 2022-08-08 NOTE — PROGRESS NOTES
"Patient is here to review test results IDed by name and date of birth. Reviewed  Allergies and medication    HT 5'9\"      SPO2 95  HR 74     BP  128/88       "

## 2022-08-31 PROBLEM — I77.810 ASCENDING AORTA DILATATION (H): Status: RESOLVED | Noted: 2022-07-24 | Resolved: 2022-08-31

## 2022-08-31 NOTE — PROGRESS NOTES
Assessment & Plan     Controlled type 2 diabetes mellitus without complication, without long-term current use of insulin (H)  A1C pending. Will notify patient of the results when available and intervene accordingly. BP at goal. On statin. Eye exam UTD. If A1C controlled, will see him back in 6 months. Sooner if A1C is poorly controlled.     - Hemoglobin A1c; Future  - Albumin Random Urine Quantitative with Creat Ratio; Future  - Hemoglobin A1c  - Albumin Random Urine Quantitative with Creat Ratio    Essential hypertension  Well controlled. Continue current medications. He does take hydrochlorothiazide and has a h/o gout, but notes that he has taken this for many years without any issues. If he starts having gout flares, will consider switching hydrochlorothiazide to losartan. Encouraged daily exercise and a low sodium diet. Recommended checking BP's 2x/wk, call the clinic if consistantly s>140 or d>90. Follow up in 6 months.     Hyperlipidemia associated with type 2 diabetes mellitus (H)  Tolerates Zetia with pravastatin once weekly. Will continue. Does not tolerate statin daily. Lipid panel pending. Will notify patient of the results when available and intervene accordingly.     FH: abdominal aortic aneurysm repair-2017, follows with vascular  Will continue follow up with vascular.     Subacute cutaneous lupus erythematosus  Flares with sun exposure. Per note from [place], rheumatology did not feel he had systemic lupus. Uses Kenalog occasional and rash resolves. Requesting refill.     - triamcinolone (KENALOG) 0.1 % external ointment; Apply topically 2 times daily    Idiopathic gout, unspecified chronicity, unspecified site  Controlled with allopurinol. Will continue. Will consider stopping hydrochlorothiazide and trying alternative if gout flares in the future.     - allopurinol (ZYLOPRIM) 100 MG tablet; Take 1 tablet (100 mg) by mouth 2 times daily    PAD (peripheral artery disease) (H)  On statin as tolerated  "and asa. Will continue.     Abnormal cardiovascular stress test  Enlarged aorta (H)  Recent stress test with reduced EF with exercise. May benefit from coronary CTA. Will refer to cardiology.     - Adult Cardiology Silvano Hoyt Referral      Return in about 6 months (around 3/8/2023).    Usha Gray NP  [facility] - MADELAINE Chavez is a 72 year old, presenting for the following health issues:  Diabetes, Hypertension, and Lipids      HPI     Diabetes Follow-up    How often are you checking your blood sugar? Once a week  What time of day are you checking your blood sugars (select all that apply)?  times vary - when he feels \" low \"   Have you had any blood sugars above 200?  No  Have you had any blood sugars below 70?  No    What symptoms do you notice when your blood sugar is low?  None and nauseated , dizzy     What concerns do you have today about your diabetes? None     Do you have any of these symptoms? (Select all that apply)  No numbness or tingling in feet.  No redness, sores or blisters on feet.  No complaints of excessive thirst.  No reports of blurry vision.  No significant changes to weight.     A1C was 6.5 on 6/7/22.     Taking Metformin 500 mg b.i.d. without side effects. Did have nausea with higher doses of Metformin.     On asa 81 mg.     Diabetic Foot Screen:  Any complaints of increased pain or numbness ? No  Is there a foot ulcer now or a history of foot ulcer? No  Does the foot have an abnormal shape? No  Are the nails thick, too long or ingrown? No  Are there any redness or open areas? No         Sensation Testing done at all points on the diagram with monofilament     Right Foot: Sensation Normal at all points  Left Foot: Sensation Normal at all points     Risk Category: 0- No loss of protective sensation  Performed by  Usha Gray CNP         Hyperlipidemia Follow-Up      Are you regularly taking any medication or supplement to lower your cholesterol?  Yes; " takes Pravastatin once weekly with Zetia, does not tolerate taking the statin daily. Developed stomach aches with Crestor, simvastatin, and atorvastatin.     Are you having muscle aches or other side effects that you think could be caused by your cholesterol lowering medication?  Yes-  toes , knees , arms , fingers     Hypertension Follow-up      Do you check your blood pressure regularly outside of the clinic? No     Are you following a low salt diet? No    Are your blood pressures ever more than 140 on the top number (systolic) OR more   than 90 on the bottom number (diastolic), for example 140/90? N/A   -Taking hydrochlorothiazide 25 mg daily without side effects. Also on potassium.     Was complaining of fatigue. Recently diagnosed with sleep apnea. A CPAP was not recommended. Mild. Placed on sleep regimen and fatigue has improved.     He also complains of ALDANA. Stress test done on 7/5/22. No reversible ischemia was seen, but his EF dropped with activity. Aorta was slightly enlarged at 4.04 cm. He does follow with vascular as he has had an aortic aneurysm repair. Recent US with no evidence of an endoleak. Rare chest pain. No diaphoresis. No nausea or vomiting.     GOUT; taking allopurinol. Rare flare.     Due for the pna 20 vaccine. Willing to get.      BP Readings from Last 2 Encounters:   09/08/22 104/68   07/19/22 125/66     Hemoglobin A1C (%)   Date Value   06/07/2022 6.5 (H)   12/01/2021 6.6 (H)     LDL Cholesterol Calculated (mg/dL)   Date Value   06/07/2022 67   12/01/2021 91         How many servings of fruits and vegetables do you eat daily?  2-3    On average, how many sweetened beverages do you drink each day (Examples: soda, juice, sweet tea, etc.  Do NOT count diet or artificially sweetened beverages)?   0    How many days per week do you exercise enough to make your heart beat faster? 3 or less    How many minutes a day do you exercise enough to make your heart beat faster? 10 - 19    How many days  "per week do you miss taking your medication? 0        Review of Systems   Constitutional, HEENT, cardiovascular, pulmonary, gi and gu systems are negative, except as otherwise noted.      Objective    /68 (BP Location: Right arm, Patient Position: Chair, Cuff Size: Adult Large)   Pulse 74   Ht 1.753 m (5' 9\")   Wt 102.1 kg (225 lb)   SpO2 93%   BMI 33.23 kg/m    Body mass index is 33.23 kg/m .  Physical Exam   GENERAL: alert, no distress and obese  EYES: Eyes grossly normal to inspection, PERRL and conjunctivae and sclerae normal  HENT: ear canals and TM's normal, nose and mouth without ulcers or lesions  NECK: no adenopathy, no asymmetry, masses, or scars and thyroid normal to palpation  RESP: lungs clear to auscultation - no rales, rhonchi or wheezes  CV: regular rate and rhythm, no murmur, click or rub, no peripheral edema and peripheral pulses present, but slightly decreased  NEURO: Normal strength and tone, mentation intact and speech normal  PSYCH: mentation appears normal, affect normal/bright  Diabetic foot exam: DP and PT pulses present, but slightly decreased, no trophic changes or ulcerative lesions and normal sensory exam    Results for orders placed or performed in visit on 09/08/22   Basic metabolic panel     Status: None (Preliminary result)   Result Value Ref Range    Sodium 138 133 - 144 mmol/L    Potassium 3.7 3.4 - 5.3 mmol/L    Chloride 104 94 - 109 mmol/L    Carbon Dioxide (CO2)      Anion Gap      Urea Nitrogen      Creatinine      Calcium      Glucose      GFR Estimate     Albumin Random Urine Quantitative with Creat Ratio     Status: None   Result Value Ref Range    Creatinine Urine mg/dL 223 mg/dL    Albumin Urine mg/L 19 mg/L    Albumin Urine mg/g Cr 8.52 0.00 - 17.00 mg/g Cr     Other labs pending              .  ..  "

## 2022-09-08 ENCOUNTER — OFFICE VISIT (OUTPATIENT)
Dept: FAMILY MEDICINE | Facility: OTHER | Age: 72
End: 2022-09-08
Attending: NURSE PRACTITIONER
Payer: MEDICARE

## 2022-09-08 VITALS
HEART RATE: 74 BPM | DIASTOLIC BLOOD PRESSURE: 68 MMHG | HEIGHT: 69 IN | WEIGHT: 225 LBS | BODY MASS INDEX: 33.33 KG/M2 | SYSTOLIC BLOOD PRESSURE: 104 MMHG | OXYGEN SATURATION: 93 %

## 2022-09-08 DIAGNOSIS — M10.00 IDIOPATHIC GOUT, UNSPECIFIED CHRONICITY, UNSPECIFIED SITE: ICD-10-CM

## 2022-09-08 DIAGNOSIS — E11.9 CONTROLLED TYPE 2 DIABETES MELLITUS WITHOUT COMPLICATION, WITHOUT LONG-TERM CURRENT USE OF INSULIN (H): Primary | ICD-10-CM

## 2022-09-08 DIAGNOSIS — Z23 NEED FOR PROPHYLACTIC VACCINATION AND INOCULATION AGAINST INFLUENZA: ICD-10-CM

## 2022-09-08 DIAGNOSIS — Z82.49: ICD-10-CM

## 2022-09-08 DIAGNOSIS — E11.69 HYPERLIPIDEMIA ASSOCIATED WITH TYPE 2 DIABETES MELLITUS (H): ICD-10-CM

## 2022-09-08 DIAGNOSIS — E78.5 HYPERLIPIDEMIA ASSOCIATED WITH TYPE 2 DIABETES MELLITUS (H): ICD-10-CM

## 2022-09-08 DIAGNOSIS — I73.9 PAD (PERIPHERAL ARTERY DISEASE) (H): ICD-10-CM

## 2022-09-08 DIAGNOSIS — L93.1 SUBACUTE CUTANEOUS LUPUS ERYTHEMATOSUS: ICD-10-CM

## 2022-09-08 DIAGNOSIS — I77.89 ENLARGED AORTA (H): ICD-10-CM

## 2022-09-08 DIAGNOSIS — Z23 NEED FOR PNEUMOCOCCAL VACCINATION: ICD-10-CM

## 2022-09-08 DIAGNOSIS — R94.39 ABNORMAL CARDIOVASCULAR STRESS TEST: ICD-10-CM

## 2022-09-08 DIAGNOSIS — I10 ESSENTIAL HYPERTENSION: ICD-10-CM

## 2022-09-08 LAB
ANION GAP SERPL CALCULATED.3IONS-SCNC: 6 MMOL/L (ref 3–14)
BUN SERPL-MCNC: 18 MG/DL (ref 7–30)
CALCIUM SERPL-MCNC: 9.1 MG/DL (ref 8.5–10.1)
CHLORIDE BLD-SCNC: 104 MMOL/L (ref 94–109)
CHOLEST SERPL-MCNC: 133 MG/DL
CO2 SERPL-SCNC: 28 MMOL/L (ref 20–32)
CREAT SERPL-MCNC: 0.87 MG/DL (ref 0.66–1.25)
CREAT UR-MCNC: 223 MG/DL
EST. AVERAGE GLUCOSE BLD GHB EST-MCNC: 140 MG/DL
GFR SERPL CREATININE-BSD FRML MDRD: >90 ML/MIN/1.73M2
GLUCOSE BLD-MCNC: 119 MG/DL (ref 70–99)
HBA1C MFR BLD: 6.5 % (ref 0–5.6)
HDLC SERPL-MCNC: 30 MG/DL
LDLC SERPL CALC-MCNC: 63 MG/DL
MICROALBUMIN UR-MCNC: 19 MG/L
MICROALBUMIN/CREAT UR: 8.52 MG/G CR (ref 0–17)
NONHDLC SERPL-MCNC: 103 MG/DL
POTASSIUM BLD-SCNC: 3.7 MMOL/L (ref 3.4–5.3)
SODIUM SERPL-SCNC: 138 MMOL/L (ref 133–144)
TRIGL SERPL-MCNC: 199 MG/DL

## 2022-09-08 PROCEDURE — 90677 PCV20 VACCINE IM: CPT

## 2022-09-08 PROCEDURE — G0008 ADMIN INFLUENZA VIRUS VAC: HCPCS

## 2022-09-08 PROCEDURE — 80061 LIPID PANEL: CPT | Mod: ZL | Performed by: NURSE PRACTITIONER

## 2022-09-08 PROCEDURE — 36415 COLL VENOUS BLD VENIPUNCTURE: CPT | Mod: ZL | Performed by: NURSE PRACTITIONER

## 2022-09-08 PROCEDURE — G0463 HOSPITAL OUTPT CLINIC VISIT: HCPCS | Mod: 25 | Performed by: NURSE PRACTITIONER

## 2022-09-08 PROCEDURE — 83036 HEMOGLOBIN GLYCOSYLATED A1C: CPT | Mod: ZL | Performed by: NURSE PRACTITIONER

## 2022-09-08 PROCEDURE — 99214 OFFICE O/P EST MOD 30 MIN: CPT | Performed by: NURSE PRACTITIONER

## 2022-09-08 PROCEDURE — 82043 UR ALBUMIN QUANTITATIVE: CPT | Mod: ZL | Performed by: NURSE PRACTITIONER

## 2022-09-08 PROCEDURE — 80048 BASIC METABOLIC PNL TOTAL CA: CPT | Mod: ZL | Performed by: NURSE PRACTITIONER

## 2022-09-08 RX ORDER — TRIAMCINOLONE ACETONIDE 1 MG/G
OINTMENT TOPICAL 2 TIMES DAILY
Qty: 30 G | Refills: 0 | Status: SHIPPED | OUTPATIENT
Start: 2022-09-08 | End: 2022-10-10

## 2022-09-08 RX ORDER — CETIRIZINE HYDROCHLORIDE 10 MG/1
10 TABLET ORAL DAILY
COMMUNITY
End: 2023-09-06

## 2022-09-08 RX ORDER — ALLOPURINOL 100 MG/1
100 TABLET ORAL 2 TIMES DAILY
Qty: 60 TABLET | Refills: 4 | Status: SHIPPED | OUTPATIENT
Start: 2022-09-08 | End: 2022-10-10

## 2022-09-08 ASSESSMENT — PAIN SCALES - GENERAL: PAINLEVEL: NO PAIN (1)

## 2022-09-08 NOTE — NURSING NOTE
"Chief Complaint   Patient presents with     Diabetes     Hypertension     Lipids       Initial There were no vitals taken for this visit. Estimated body mass index is 33.97 kg/m  as calculated from the following:    Height as of 7/19/22: 1.753 m (5' 9\").    Weight as of 7/19/22: 104.3 kg (230 lb).  Medication Reconciliation: complete  Li Hollingsworth LPN  "

## 2022-09-09 ENCOUNTER — TELEPHONE (OUTPATIENT)
Dept: FAMILY MEDICINE | Facility: OTHER | Age: 72
End: 2022-09-09

## 2022-09-11 NOTE — PROGRESS NOTES
"Scott Akhtar is a 72 year old male who is being evaluated via a billable video visit.       The patient has been notified of following:      \"This video visit will be conducted via a call between you and your physician/provider. We have found that certain health care needs can be provided without the need for an in-person physical exam.  This service lets us provide the care you need with a video conversation.  If a prescription is necessary we can send it directly to your pharmacy.  If lab work is needed we can place an order for that and you can then stop by our lab to have the test done at a later time.     Video visits are billed at different rates depending on your insurance coverage.  Please reach out to your insurance provider with any questions.     If during the course of the call the physician/provider feels a video visit is not appropriate, you will not be charged for this service.\"     Patient has given verbal consent for Video visit? Yes  How would you like to obtain your AVS? Mail a copy  If you are dropped from the video visit, the video invite should be resent to: Text to cell phone: [phone]  Will anyone else be joining your video visit? No  If patient encounters technical issues they should call [phone]      Video-Visit Details     Type of service:  Video Visit     Video Start Time: 10:30am  Video End Time: 10:45am    Originating Location (pt. Location): Home     Distant Location (provider location):  [facility]      Platform used for Video Visit: [email or web address]    Virtual visit for borderline mild obstructive sleep apnea, chronic insomnia.     A/P:     1.)  Borderline mild obstructive sleep apnea (pAHI 5)  - Strong positional component (supine pAHI 16.2, non-supine pAHI 1.5)  - Sleep associated hypoxemia was not present.  - We discussed treatment options including CPAP, positional therapy.  Plan to start with attempt at positional therapy.     2.)  Chronic daytime " fatigue  3.)  Irregular sleep-wake pattern with unclear total sleep time     Overall, it is possible that mild obstructive sleep apnea may be playing part of his symptoms, though I suspect it is more related to his irregular sleep-wake pattern with components of psychophysiological insomnia, reduce constraints on time.  My basis for the suspicion is that his symptoms seem to be primarily noticed after longterm and significant changes in his sleep-wake pattern.    He feels that his sleep-wake pattern has improved and he is overall happy with his current sleep following our behavioral modification recommendations since our last visit.  If concerns in the future, I would likely focus more on sleep restriction.    We will plan to continue behavioral modification and plan for follow-up in 1 year, or sooner if questions or concerns.  Currently, I did not see a strong indication for treatment of his borderline obstructive sleep apnea, but we will continue to monitor.       SUBJECTIVE:  Scott Akhtar is a 72 year old male.     Pertinent PMHx of hiatal hernia (small), NAFLD, DM II, HTN, dermatitis herpetiformis, obesity.    Prior Sleep Testin2022 - WatchPAT HST with weight 230 lbs, BMI 34.  pAHI 5.  Mean oxygen saturation was 91%.  Minimum was 85%.  Time with saturation less than 88% was 0.9 minutes.     2022 -we reviewed his sleep history.  Initially, he reports that he sleeps 13 hours a day and still feels quite tired.  But as we went through a more detailed history about his sleep-wake pattern, I feel that this is likely not the case.  He has a fairly irregular sleep-wake pattern.  He will get in to bed anywhere from 9-10 PM or as late as possibly to 3 in the morning.  He will often watch TV for multiple hours in bed, timing of sleep onset may be difficult to assess though he does often look at the clock.  He also notes frequent awakenings during the night to urinate, here he will often take  potentially multiple hours to return to sleep and is often watching the clock.  During this time he is usually laying in the dark and having his mind racing.  He will awaken naturally the morning anywhere from 9 AM to potentially after noon.  He denies taking regular naps, and sounds as if even if he did try that he would be unlikely to fall asleep.     While he feels he has had some fatigue for many years, most of his sleep concerns seem to have exacerbated since his alf 6-7 years ago.  He used to work in construction and had a quite regular sleep-wake pattern of going to bed and fall asleep between 8-10 PM and awakening between 5-6 AM on workdays, though he would often sleep later on the weekends.     Per his wife, his snoring and observed apnea have reduced over time.     He does use [place] [employer] marijuana with 5-CBD and 5-THC, 2-3 times per week as an oral spray, usually between 5-7 PM.  Caffeine use is 1-2 cups of coffee in the morning.  Alcohol use is 1-2 drinks on most nights usually around 6 PM.  He denies any nicotine use.     Normal CBC, CMP, TSH on 6/7/2022.    A/P where we reviewed behavioral modification for chronic insomnia, consider trial of CPAP if no significant improvement.    Today -overall, he feels that his sleep is doing much better, he is more consistently able to fall asleep and stay asleep and this is led to a significant improvement in his daytime fatigue.  He is working with cardiology and will be having what sounds like an upcoming coronary artery angiogram.    He will now go today between 10-11 PM, he feels that he falls asleep in an adequate and acceptable amount of time.  He feels that he is having less and shorter awakenings.  Will be out of bed between 9-10 AM.        Past medical history:    Patient Active Problem List    Diagnosis Date Noted     Subacute cutaneous lupus erythematosus-rheum did not feel he had systemic lupud 11/30/2021     Priority: Medium      Presence of right artificial shoulder joint 06/15/2021     Priority: Medium     Adverse effect of antihyperlipidemic drug, sequela 12/30/2020     Priority: Medium     Hepatic steatosis 08/17/2020     Priority: Medium     Hyperlipidemia associated with type 2 diabetes mellitus (H) 08/03/2020     Priority: Medium     PAD (peripheral artery disease) (H) 01/31/2020     Priority: Medium     Disorder of joint prosthesis (H) 02/28/2019     Priority: Medium     Overview:   Added automatically from request for surgery [id number]       Lumbar radiculopathy 10/06/2017     Priority: Medium     Muscle weakness 10/06/2017     Priority: Medium     History of repair of aneurysm of abdominal aorta using endovascular stent graft 01/26/2017     Priority: Medium     Presence of other vascular implants and grafts 01/26/2017     Priority: Medium     Gout 08/04/2016     Priority: Medium     Obesity with body mass index 30 or greater 07/28/2016     Priority: Medium     Controlled type 2 diabetes mellitus without complication, without long-term current use of insulin (H) 07/27/2016     Priority: Medium     4/15/13:  A1c 6.5  10/13/2020- Diabetes without diabetic retinopathy of both eyes.  Overview:   Overview:   4/15/13:  A1c 6.5       Dermatitis herpetiformis 07/27/2016     Priority: Medium     Overview:   Diagnosed while in the .  Started after visiting [facility] in 1971.  Treats with dapsone four times a week; when he cuts down on the dose he gets a breakout of little water blisters on the backs of his hands, elbows, and knees.  Overview:   Overview:   Diagnosed while in the .  Started after visiting [facility] in 1971.  Treats with dapsone four times a week; when he cuts down on the dose he gets a breakout of little water blisters on the backs of his hands, elbows, and knees.       Essential hypertension 07/27/2016     Priority: Medium     Degenerative scoliosis in adult patient 07/27/2016     Priority: Medium     Sensorineural  "hearing loss (SNHL) 06/26/2013     Priority: Medium     Diaphragmatic hernia without obstruction or gangrene 12/19/2012     Priority: Medium     Overview:   Overview:   12/19/12, \"small esophageal hiatal hernia\" noted on CT abdomen/pelvis.       Gastroesophageal reflux disease without esophagitis 01/13/2005     Priority: Medium       10 point ROS of systems including Constitutional, Eyes, Respiratory, Cardiovascular, Gastroenterology, Genitourinary, Integumentary, Muscularskeletal, Psychiatric were all negative except for pertinent positives noted in my HPI.    Current Outpatient Medications   Medication Sig Dispense Refill     allopurinol (ZYLOPRIM) 100 MG tablet Take 1 tablet (100 mg) by mouth 2 times daily 60 tablet 4     ASPIRIN EC PO Take 81 mg by mouth daily       cetirizine (ZYRTEC) 10 MG tablet Take 10 mg by mouth daily       Cholecalciferol (VITAMIN D3 PO) Take 5,000 Units by mouth daily 2000 to 5000       ezetimibe (ZETIA) 10 MG tablet Take 1 tablet (10 mg) by mouth daily 90 tablet 0     hydrochlorothiazide (HYDRODIURIL) 25 MG tablet Take 1 tablet (25 mg) by mouth daily 90 tablet 1     METFORMIN HCL PO Take 500 mg by mouth 2 times daily (with meals)        Multiple Minerals (CALCIUM-MAGNESIUM-ZINC) TABS Take 1 tablet by mouth daily       Omega-3 Fatty Acids (OMEGA-3 FISH OIL PO) Take 1,200 mg by mouth daily        ONETOUCH ULTRA test strip TEST ONCE A  strip 3     potassium chloride ER (KLOR-CON) 10 MEQ CR tablet Take 1 tablet (10 mEq) by mouth daily 10 tablet 0     pravastatin (PRAVACHOL) 10 MG tablet 1 tablet 2 times weekly. (Patient taking differently: 10 mg once a week Patient takes 1 tablet every Monday - 10 mg) 24 tablet 3     triamcinolone (KENALOG) 0.1 % external ointment Apply topically 2 times daily 30 g 0       OBJECTIVE:  There were no vitals taken for this visit.    Physical Exam     ---  This note was written with the assistance of the Dragon voice-dictation technology software. The " final document, although reviewed, may contain errors. For corrections, please contact the office.    Clif Tee MD    Sleep Medicine    [place], [place]  o Main Office: [phone]    [facility] [place], [place] ([phone])  o Schedule visits: [phone]  o Main Office: [phone]    Time spent on the date of service:  25 minutes.

## 2022-09-13 ENCOUNTER — VIRTUAL VISIT (OUTPATIENT)
Dept: PULMONOLOGY | Facility: OTHER | Age: 72
End: 2022-09-13
Attending: FAMILY MEDICINE
Payer: COMMERCIAL

## 2022-09-13 VITALS — WEIGHT: 225 LBS | HEIGHT: 69 IN | BODY MASS INDEX: 33.33 KG/M2

## 2022-09-13 DIAGNOSIS — F51.04 PSYCHOPHYSIOLOGICAL INSOMNIA: Primary | ICD-10-CM

## 2022-09-13 PROCEDURE — 99213 OFFICE O/P EST LOW 20 MIN: CPT | Mod: 95 | Performed by: FAMILY MEDICINE

## 2022-09-13 ASSESSMENT — SLEEP AND FATIGUE QUESTIONNAIRES
HOW LIKELY ARE YOU TO NOD OFF OR FALL ASLEEP WHILE SITTING INACTIVE IN A PUBLIC PLACE: WOULD NEVER DOZE
HOW LIKELY ARE YOU TO NOD OFF OR FALL ASLEEP WHILE SITTING AND READING: WOULD NEVER DOZE
HOW LIKELY ARE YOU TO NOD OFF OR FALL ASLEEP WHEN YOU ARE A PASSENGER IN A CAR FOR AN HOUR WITHOUT A BREAK: WOULD NEVER DOZE
HOW LIKELY ARE YOU TO NOD OFF OR FALL ASLEEP WHILE SITTING AND TALKING TO SOMEONE: WOULD NEVER DOZE
HOW LIKELY ARE YOU TO NOD OFF OR FALL ASLEEP IN A CAR, WHILE STOPPED FOR A FEW MINUTES IN TRAFFIC: WOULD NEVER DOZE
HOW LIKELY ARE YOU TO NOD OFF OR FALL ASLEEP WHILE WATCHING TV: WOULD NEVER DOZE
HOW LIKELY ARE YOU TO NOD OFF OR FALL ASLEEP WHILE LYING DOWN TO REST IN THE AFTERNOON WHEN CIRCUMSTANCES PERMIT: SLIGHT CHANCE OF DOZING
HOW LIKELY ARE YOU TO NOD OFF OR FALL ASLEEP WHILE SITTING QUIETLY AFTER LUNCH WITHOUT ALCOHOL: WOULD NEVER DOZE

## 2022-09-13 NOTE — NURSING NOTE
Chief Complaint   Patient presents with     Video Visit     1 month follow up       Patient confirms medications and allergies are accurate via patients echeck in completion, and or denies any changes since last reviewed/verified.     Flu shot given 9/8/2022    Shanice Ndiaye, Virtual Facilitator/PANKAJN

## 2022-09-20 ENCOUNTER — MYC MEDICAL ADVICE (OUTPATIENT)
Dept: FAMILY MEDICINE | Facility: OTHER | Age: 72
End: 2022-09-20

## 2022-09-21 ENCOUNTER — TELEPHONE (OUTPATIENT)
Dept: FAMILY MEDICINE | Facility: OTHER | Age: 72
End: 2022-09-21

## 2022-09-21 NOTE — TELEPHONE ENCOUNTER
Called the patient and he said that he needs the [facility] for the medications that you had prescribed him at that visit [facility]. ( [facility])

## 2022-09-21 NOTE — TELEPHONE ENCOUNTER
[facility] called [facility] to them from the 9/8/22  appointment before the  triamcinolone (KENALOG) 0.1 % external ointment and allopurinol (ZYLOPRIM) 100 MG tablet will be filled    allopurinol (ZYLOPRIM) 100 MG tablet 60 tablet 4 9/8/2022  --   Sig - Route: Take 1 tablet (100 mg) by mouth 2 times daily - Oral   Sent to pharmacy as: Allopurinol 100 MG Oral Tablet (ZYLOPRIM)   Class: E-Prescribe   Order: [id number]   E-Prescribing Status: Receipt confirmed by pharmacy (9/8/2022  9:39 AM CDT)     triamcinolone (KENALOG) 0.1 % external ointment 30 g 0 9/8/2022  --   Sig - Route: Apply topically 2 times daily - Topical   Sent to pharmacy as: Triamcinolone Acetonide 0.1 % External Ointment (KENALOG)   Class: E-Prescribe   Order: [id number]   E-Prescribing Status: Receipt confirmed by pharmacy (9/8/2022  9:39 AM CDT)

## 2022-10-07 ENCOUNTER — TELEPHONE (OUTPATIENT)
Dept: FAMILY MEDICINE | Facility: OTHER | Age: 72
End: 2022-10-07

## 2022-10-07 DIAGNOSIS — L93.1 SUBACUTE CUTANEOUS LUPUS ERYTHEMATOSUS: ICD-10-CM

## 2022-10-07 DIAGNOSIS — M10.00 IDIOPATHIC GOUT, UNSPECIFIED CHRONICITY, UNSPECIFIED SITE: ICD-10-CM

## 2022-10-07 NOTE — TELEPHONE ENCOUNTER
11:06 AM    Reason for Call: Phone Call    Description: Scott is having problems getting [facility] and he is wondering if you can send Allopurinol and Kenalog to [facility]?     Was an appointment offered for this call? No  If yes : Appointment type              Date    Preferred method for responding to this message: Telephone Call  What is your phone number ? [phone]    If we cannot reach you directly, may we leave a detailed response at the number you provided? Yes    Can this message wait until your PCP/provider returns, if available today? YES, No    Anabel Cedeno

## 2022-10-10 RX ORDER — ALLOPURINOL 100 MG/1
100 TABLET ORAL 2 TIMES DAILY
Qty: 60 TABLET | Refills: 4 | Status: SHIPPED | OUTPATIENT
Start: 2022-10-10 | End: 2022-10-11

## 2022-10-10 RX ORDER — TRIAMCINOLONE ACETONIDE 1 MG/G
OINTMENT TOPICAL 2 TIMES DAILY
Qty: 30 G | Refills: 0 | Status: SHIPPED | OUTPATIENT
Start: 2022-10-10 | End: 2024-01-17

## 2022-10-11 ENCOUNTER — TRANSFERRED RECORDS (OUTPATIENT)
Dept: HEALTH INFORMATION MANAGEMENT | Facility: CLINIC | Age: 72
End: 2022-10-11

## 2022-10-11 DIAGNOSIS — M10.00 IDIOPATHIC GOUT, UNSPECIFIED CHRONICITY, UNSPECIFIED SITE: ICD-10-CM

## 2022-10-11 LAB — RETINOPATHY: NEGATIVE

## 2022-10-11 RX ORDER — ALLOPURINOL 100 MG/1
100 TABLET ORAL 2 TIMES DAILY
Qty: 60 TABLET | Refills: 4 | Status: SHIPPED | OUTPATIENT
Start: 2022-10-11 | End: 2023-03-08

## 2022-10-11 NOTE — TELEPHONE ENCOUNTER
allopurinol      Last Written Prescription Date:  10/10/22  Last Fill Quantity: 60,   # refills: 4  Last Office Visit: 7/25/22  Future Office visit:    Next 5 appointments (look out 90 days)    Oct 18, 2022  1:30 PM  (Arrive by 1:15 PM)  Return Visit with Shanice Robledo NP  [facility] [place] ([facility] [place] ) [address]  [place] [address]  [phone]           Routing refill request to provider for review/approval because:

## 2022-10-18 ENCOUNTER — OFFICE VISIT (OUTPATIENT)
Dept: OTOLARYNGOLOGY | Facility: OTHER | Age: 72
End: 2022-10-18
Attending: NURSE PRACTITIONER
Payer: COMMERCIAL

## 2022-10-18 VITALS
WEIGHT: 225 LBS | HEIGHT: 69 IN | SYSTOLIC BLOOD PRESSURE: 120 MMHG | BODY MASS INDEX: 33.33 KG/M2 | TEMPERATURE: 96.6 F | DIASTOLIC BLOOD PRESSURE: 70 MMHG | HEART RATE: 64 BPM | OXYGEN SATURATION: 98 %

## 2022-10-18 DIAGNOSIS — R05.3 CHRONIC COUGH: ICD-10-CM

## 2022-10-18 DIAGNOSIS — H61.23 BILATERAL IMPACTED CERUMEN: Primary | ICD-10-CM

## 2022-10-18 DIAGNOSIS — E04.1 THYROID NODULE: ICD-10-CM

## 2022-10-18 PROCEDURE — G0463 HOSPITAL OUTPT CLINIC VISIT: HCPCS

## 2022-10-18 PROCEDURE — 99213 OFFICE O/P EST LOW 20 MIN: CPT | Mod: 25 | Performed by: NURSE PRACTITIONER

## 2022-10-18 PROCEDURE — 69210 REMOVE IMPACTED EAR WAX UNI: CPT | Performed by: NURSE PRACTITIONER

## 2022-10-18 PROCEDURE — 69210 REMOVE IMPACTED EAR WAX UNI: CPT | Mod: 50 | Performed by: NURSE PRACTITIONER

## 2022-10-18 ASSESSMENT — PAIN SCALES - GENERAL: PAINLEVEL: NO PAIN (0)

## 2022-10-18 NOTE — PROGRESS NOTES
Otolaryngology Note         Chief Complaint:     Patient presents with:  Follow Up: 3 month Ear Check            History of Present Illness:     Scott Akhtar is a 72 year old male seen today for cough.  Cough comes and goes and is worse now that the heat has turned on.  The cough is dry and barky.      No wheezing.  He is currently undergoing work up with Cardiology.  He has increased abdominal pain (muscle pain) with cough.  He continues to take zyrtec     No dysphagia  Sleeping well, no recurrent heartburn  Cough has been present for many years  Improved for some time this summer but never really diasappears  Coughs up phlegm every morning.   No rinsing or sprays in nose.   He has tried singulair in the past without much improvement  Dry cough  Working with cardiology currrnetly    He reports less tinnitus.          Medications:     Current Outpatient Rx   Medication Sig Dispense Refill     allopurinol (ZYLOPRIM) 100 MG tablet Take 1 tablet (100 mg) by mouth 2 times daily 60 tablet 4     ASPIRIN EC PO Take 81 mg by mouth daily       cetirizine (ZYRTEC) 10 MG tablet Take 10 mg by mouth daily       Cholecalciferol (VITAMIN D3 PO) Take 5,000 Units by mouth daily 2000 to 5000       hydrochlorothiazide (HYDRODIURIL) 25 MG tablet Take 1 tablet (25 mg) by mouth daily 90 tablet 1     METFORMIN HCL PO Take 500 mg by mouth 2 times daily (with meals)        Multiple Minerals (CALCIUM-MAGNESIUM-ZINC) TABS Take 1 tablet by mouth daily       Omega-3 Fatty Acids (OMEGA-3 FISH OIL PO) Take 1,200 mg by mouth daily        ONETOUCH ULTRA test strip TEST ONCE A  strip 3     potassium chloride ER (KLOR-CON) 10 MEQ CR tablet Take 1 tablet (10 mEq) by mouth daily 10 tablet 0     pravastatin (PRAVACHOL) 10 MG tablet 1 tablet 2 times weekly. (Patient taking differently: 10 mg once a week Patient takes 1 tablet every Monday - 10 mg) 24 tablet 3     triamcinolone (KENALOG) 0.1 % external ointment Apply topically 2 times daily  "30 g 0     ezetimibe (ZETIA) 10 MG tablet Take 1 tablet (10 mg) by mouth daily 90 tablet 0            Allergies:     Allergies: Pseudoephedrine hcl, Simvastatin, Sulfa drugs, and Chinese ink          Past Medical History:     Past Medical History:   Diagnosis Date     Aneurysm of thoracic aorta      Diabetes (H)      External thrombosed hemorrhoids      Hiatal hernia      Hypertension      Need for prophylactic vaccination and inoculation against unspecified single bacterial disease      Other chest pain             Past Surgical History:     Past Surgical History:   Procedure Laterality Date     ABDOMINAL AORTIC ANEURYSM REPAIR       BACK SURGERY      laminectomy L5S1     COLONOSCOPY       COLONOSCOPY N/A 10/14/2016    Procedure: COLONOSCOPY;  Surgeon: Daljit Salazar MD;  Location: [facility]     FINGER GANGLION CYST EXCISION       L5 S1 Laminectomy  1991     ROTATOR CUFF REPAIR RT/LT  2006     SHOULDER SURGERY Right     replacement     TONSILLECTOMY  1971       ENT family history reviewed         Social History:     Social History     Tobacco Use     Smoking status: Former     Types: Cigarettes     Quit date: 1985     Years since quittin.8     Smokeless tobacco: Never   Substance Use Topics     Alcohol use: Yes     Alcohol/week: 0.0 standard drinks     Comment: 3 beers daily      Drug use: Never     Comment: medical marijuana            Review of Systems:     ROS: See HPI         Physical Exam:     /70 (BP Location: Right arm, Patient Position: Sitting, Cuff Size: Adult Regular)   Pulse 64   Temp (!) 96.6  F (35.9  C) (Tympanic)   Ht 1.753 m (5' 9.02\")   Wt 102.1 kg (225 lb)   SpO2 98%   BMI 33.21 kg/m      General - The patient is well nourished and well developed, and appears to have good nutritional status.  Alert and oriented to person and place, answers questions and cooperates with examination appropriately.   Head and Face - Normocephalic and atraumatic, with no gross " asymmetry noted.  The facial nerve is intact, with strong symmetric movements.  Voice and Breathing - The patient was breathing comfortably without the use of accessory muscles. There was no wheezing, stridor. The patients voice was clear and strong, and had appropriate pitch and quality.  Ears - External ear normal.  The ears were examined under binocular microscopy and with otoscope.  Bilateral canals are cerumen impacted, they were cleaned with cupped forceps, suction. Bilateral TMs are intact without effusion or retraction.   Eyes - Extraocular movements intact, sclera were not icteric or injected.  Mouth - Examination of the oral cavity showed pink, healthy oral mucosa. Dentition in good condition. No lesions or ulcerations noted. The tongue was mobile and midline.   Throat - The walls of the oropharynx were smooth, pink, moist, symmetric, and had no lesions or ulcerations.  The tonsillar pillars and soft palate were symmetric. The uvula was midline on elevation.    Neck - Normal midline excursion of the laryngotracheal complex during swallowing.  Full range of motion on passive movement.  Palpation of the occipital, submental, submandibular, internal jugular chain, and supraclavicular nodes did not demonstrate any abnormal lymph nodes or masses.  Palpation of the thyroid - slightly enlarged thyroid with left sided thyroid nodule, he has compressive symptoms with palpation of the thyroid.  The trachea was mobile and midline.  Nose - External contour is symmetric, no gross deflection or scars.  Nasal mucosa is pink and moist with no abnormal mucus.  The septum and turbinates were evaluated with nasal speculum no polyps, masses, or purulence noted on examination.         Assessment and Plan:       ICD-10-CM    1. Bilateral impacted cerumen  H61.23       2. Thyroid nodule  E04.1 US Thyroid      3. Chronic cough  R05.3           Follow up with Dr Bell next week  Complete US thyroid  I will call you with results of  the thyroid ultrasound     Follow up in 3 months for ear recheck     Try jeremias med sinus rinse for post nasal drainage.     Shanice COCHRAN  [facility]

## 2022-10-18 NOTE — NURSING NOTE
"Chief Complaint   Patient presents with     Follow Up     3 month Ear Check        Initial /70 (BP Location: Right arm, Patient Position: Sitting, Cuff Size: Adult Regular)   Pulse 64   Temp (!) 96.6  F (35.9  C) (Tympanic)   Ht 1.753 m (5' 9.02\")   Wt 102.1 kg (225 lb)   SpO2 98%   BMI 33.21 kg/m   Estimated body mass index is 33.21 kg/m  as calculated from the following:    Height as of this encounter: 1.753 m (5' 9.02\").    Weight as of this encounter: 102.1 kg (225 lb).  Medication Reconciliation: complete  Merlyn Porter LPN    "

## 2022-10-18 NOTE — LETTER
10/18/2022         RE: Scott Akhtar  [address] [facility] [address]        Dear Colleague,    Thank you for referring your patient, Scott Akhtar, to the [facility]. Please see a copy of my visit note below.    Otolaryngology Note         Chief Complaint:     Patient presents with:  Follow Up: 3 month Ear Check            History of Present Illness:     Scott Akhtar is a 72 year old male seen today for cough.  Cough comes and goes and is worse now that the heat has turned on.  The cough is dry and barky.      No wheezing.  He is currently undergoing work up with Cardiology.  He has increased abdominal pain (muscle pain) with cough.  He continues to take zyrtec     No dysphagia  Sleeping well, no recurrent heartburn  Cough has been present for many years  Improved for some time this summer but never really diasappears  Coughs up phlegm every morning.   No rinsing or sprays in nose.   He has tried singulair in the past without much improvement  Dry cough  Working with cardiology currrnetly    He reports less tinnitus.          Medications:     Current Outpatient Rx   Medication Sig Dispense Refill     allopurinol (ZYLOPRIM) 100 MG tablet Take 1 tablet (100 mg) by mouth 2 times daily 60 tablet 4     ASPIRIN EC PO Take 81 mg by mouth daily       cetirizine (ZYRTEC) 10 MG tablet Take 10 mg by mouth daily       Cholecalciferol (VITAMIN D3 PO) Take 5,000 Units by mouth daily 2000 to 5000       hydrochlorothiazide (HYDRODIURIL) 25 MG tablet Take 1 tablet (25 mg) by mouth daily 90 tablet 1     METFORMIN HCL PO Take 500 mg by mouth 2 times daily (with meals)        Multiple Minerals (CALCIUM-MAGNESIUM-ZINC) TABS Take 1 tablet by mouth daily       Omega-3 Fatty Acids (OMEGA-3 FISH OIL PO) Take 1,200 mg by mouth daily        ONETOUCH ULTRA test strip TEST ONCE A  strip 3     potassium chloride ER (KLOR-CON) 10 MEQ CR tablet Take 1 tablet (10 mEq) by mouth daily 10  "tablet 0     pravastatin (PRAVACHOL) 10 MG tablet 1 tablet 2 times weekly. (Patient taking differently: 10 mg once a week Patient takes 1 tablet every Monday - 10 mg) 24 tablet 3     triamcinolone (KENALOG) 0.1 % external ointment Apply topically 2 times daily 30 g 0     ezetimibe (ZETIA) 10 MG tablet Take 1 tablet (10 mg) by mouth daily 90 tablet 0            Allergies:     Allergies: Pseudoephedrine hcl, Simvastatin, Sulfa drugs, and Chinese ink          Past Medical History:     Past Medical History:   Diagnosis Date     Aneurysm of thoracic aorta      Diabetes (H)      External thrombosed hemorrhoids      Hiatal hernia      Hypertension      Need for prophylactic vaccination and inoculation against unspecified single bacterial disease      Other chest pain             Past Surgical History:     Past Surgical History:   Procedure Laterality Date     ABDOMINAL AORTIC ANEURYSM REPAIR       BACK SURGERY      laminectomy L5S1     COLONOSCOPY       COLONOSCOPY N/A 10/14/2016    Procedure: COLONOSCOPY;  Surgeon: Daljit Salazar MD;  Location: [facility]     FINGER GANGLION CYST EXCISION       L5 S1 Laminectomy  1991     ROTATOR CUFF REPAIR RT/LT  2006     SHOULDER SURGERY Right     replacement     TONSILLECTOMY  1971       ENT family history reviewed         Social History:     Social History     Tobacco Use     Smoking status: Former     Types: Cigarettes     Quit date: 1985     Years since quittin.8     Smokeless tobacco: Never   Substance Use Topics     Alcohol use: Yes     Alcohol/week: 0.0 standard drinks     Comment: 3 beers daily      Drug use: Never     Comment: medical marijuana            Review of Systems:     ROS: See HPI         Physical Exam:     /70 (BP Location: Right arm, Patient Position: Sitting, Cuff Size: Adult Regular)   Pulse 64   Temp (!) 96.6  F (35.9  C) (Tympanic)   Ht 1.753 m (5' 9.02\")   Wt 102.1 kg (225 lb)   SpO2 98%   BMI 33.21 kg/m      General - " The patient is well nourished and well developed, and appears to have good nutritional status.  Alert and oriented to person and place, answers questions and cooperates with examination appropriately.   Head and Face - Normocephalic and atraumatic, with no gross asymmetry noted.  The facial nerve is intact, with strong symmetric movements.  Voice and Breathing - The patient was breathing comfortably without the use of accessory muscles. There was no wheezing, stridor. The patients voice was clear and strong, and had appropriate pitch and quality.  Ears - External ear normal.  The ears were examined under binocular microscopy and with otoscope.  Bilateral canals are cerumen impacted, they were cleaned with cupped forceps, suction. Bilateral TMs are intact without effusion or retraction.   Eyes - Extraocular movements intact, sclera were not icteric or injected.  Mouth - Examination of the oral cavity showed pink, healthy oral mucosa. Dentition in good condition. No lesions or ulcerations noted. The tongue was mobile and midline.   Throat - The walls of the oropharynx were smooth, pink, moist, symmetric, and had no lesions or ulcerations.  The tonsillar pillars and soft palate were symmetric. The uvula was midline on elevation.    Neck - Normal midline excursion of the laryngotracheal complex during swallowing.  Full range of motion on passive movement.  Palpation of the occipital, submental, submandibular, internal jugular chain, and supraclavicular nodes did not demonstrate any abnormal lymph nodes or masses.  Palpation of the thyroid - slightly enlarged thyroid with left sided thyroid nodule, he has compressive symptoms with palpation of the thyroid.  The trachea was mobile and midline.  Nose - External contour is symmetric, no gross deflection or scars.  Nasal mucosa is pink and moist with no abnormal mucus.  The septum and turbinates were evaluated with nasal speculum no polyps, masses, or purulence noted on  examination.         Assessment and Plan:       ICD-10-CM    1. Bilateral impacted cerumen  H61.23       2. Thyroid nodule  E04.1 US Thyroid      3. Chronic cough  R05.3           Follow up with Dr Bell next week  Complete US thyroid  I will call you with results of the thyroid ultrasound     Follow up in 3 months for ear recheck     Try jeremias med sinus rinse for post nasal drainage.     Shanice COCHRAN  [facility]        Again, thank you for allowing me to participate in the care of your patient.        Sincerely,        Shanice Robledo NP

## 2022-10-18 NOTE — PATIENT INSTRUCTIONS
Thank you for allowing Shanice Meena and our ENT team to participate in your care.  If your medications are too expensive, please give the nurse a call.  We can possibly change this medication.  If you have a scheduling or an appointment question please contact our Health Unit Coordinator at their direct line [phone].   ALL nursing questions or concerns can be directed to your ENT nurse at: [phone] - [facility]     Follow up with Dr Bell next week  Complete US thyroid  I will call you with results of the thyroid ultrasound     Follow up in 3 months for ear recheck     Try jeremias med sinus rinse for post nasal drainage.

## 2022-10-24 ENCOUNTER — ANCILLARY PROCEDURE (OUTPATIENT)
Dept: GENERAL RADIOLOGY | Facility: OTHER | Age: 72
End: 2022-10-24
Attending: INTERNAL MEDICINE
Payer: MEDICARE

## 2022-10-24 ENCOUNTER — APPOINTMENT (OUTPATIENT)
Dept: GENERAL RADIOLOGY | Facility: OTHER | Age: 72
End: 2022-10-24
Attending: NURSE PRACTITIONER
Payer: MEDICARE

## 2022-10-24 ENCOUNTER — PATIENT OUTREACH (OUTPATIENT)
Dept: CARE COORDINATION | Facility: OTHER | Age: 72
End: 2022-10-24

## 2022-10-24 ENCOUNTER — OFFICE VISIT (OUTPATIENT)
Dept: CARDIOLOGY | Facility: OTHER | Age: 72
End: 2022-10-24
Attending: NURSE PRACTITIONER
Payer: COMMERCIAL

## 2022-10-24 VITALS
DIASTOLIC BLOOD PRESSURE: 88 MMHG | TEMPERATURE: 98.2 F | WEIGHT: 227.9 LBS | SYSTOLIC BLOOD PRESSURE: 118 MMHG | OXYGEN SATURATION: 94 % | BODY MASS INDEX: 33.64 KG/M2 | HEART RATE: 65 BPM

## 2022-10-24 DIAGNOSIS — R05.9 COUGH, UNSPECIFIED TYPE: ICD-10-CM

## 2022-10-24 DIAGNOSIS — K59.01 SLOW TRANSIT CONSTIPATION: ICD-10-CM

## 2022-10-24 DIAGNOSIS — E78.1 HYPERTRIGLYCERIDEMIA: ICD-10-CM

## 2022-10-24 DIAGNOSIS — E87.8 IMPAIRED HYDRATION: ICD-10-CM

## 2022-10-24 DIAGNOSIS — E86.0 DEHYDRATION: ICD-10-CM

## 2022-10-24 DIAGNOSIS — R10.9 ABDOMINAL DISCOMFORT: ICD-10-CM

## 2022-10-24 DIAGNOSIS — R94.39 ABNORMAL STRESS TEST: Primary | ICD-10-CM

## 2022-10-24 DIAGNOSIS — I10 ESSENTIAL HYPERTENSION: ICD-10-CM

## 2022-10-24 DIAGNOSIS — Z98.890 HISTORY OF AAA (ABDOMINAL AORTIC ANEURYSM) REPAIR: ICD-10-CM

## 2022-10-24 DIAGNOSIS — E11.9 CONTROLLED TYPE 2 DIABETES MELLITUS WITHOUT COMPLICATION, WITHOUT LONG-TERM CURRENT USE OF INSULIN (H): ICD-10-CM

## 2022-10-24 DIAGNOSIS — R94.39 ABNORMAL CARDIOVASCULAR STRESS TEST: ICD-10-CM

## 2022-10-24 DIAGNOSIS — I71.21 ANEURYSM OF ASCENDING AORTA WITHOUT RUPTURE (H): ICD-10-CM

## 2022-10-24 DIAGNOSIS — R07.89 OTHER CHEST PAIN: Primary | ICD-10-CM

## 2022-10-24 DIAGNOSIS — Z95.828 HISTORY OF REPAIR OF ANEURYSM OF ABDOMINAL AORTA USING ENDOVASCULAR STENT GRAFT: ICD-10-CM

## 2022-10-24 PROCEDURE — 93005 ELECTROCARDIOGRAM TRACING: CPT | Performed by: INTERNAL MEDICINE

## 2022-10-24 PROCEDURE — G0463 HOSPITAL OUTPT CLINIC VISIT: HCPCS | Mod: 25

## 2022-10-24 PROCEDURE — 99205 OFFICE O/P NEW HI 60 MIN: CPT | Performed by: INTERNAL MEDICINE

## 2022-10-24 PROCEDURE — G0463 HOSPITAL OUTPT CLINIC VISIT: HCPCS

## 2022-10-24 PROCEDURE — 71046 X-RAY EXAM CHEST 2 VIEWS: CPT | Mod: TC

## 2022-10-24 ASSESSMENT — PAIN SCALES - GENERAL: PAINLEVEL: MILD PAIN (2)

## 2022-10-24 NOTE — NURSING NOTE
"Chief Complaint   Patient presents with     New Patient       Initial /88   Pulse 65   Temp 98.2  F (36.8  C) (Tympanic)   Wt 103.4 kg (227 lb 14.4 oz)   SpO2 94%   BMI 33.64 kg/m   Estimated body mass index is 33.64 kg/m  as calculated from the following:    Height as of 10/18/22: 1.753 m (5' 9.02\").    Weight as of this encounter: 103.4 kg (227 lb 14.4 oz).  Medication Reconciliation: complete  Cecilia Perdomo LPN    "

## 2022-10-24 NOTE — PROGRESS NOTES
[facility]   CARDIOLOGY CONSULT     Scott Akhtar   1950  [id number]    sUha Gray     Chief Complaint   Patient presents with     New Patient          HPI:   Mr. Akhtar is a 72-year-old gentleman who is being seen by cardiology for atypical chest discomfort.    He also has a history of an ascending aortic aneurysm, AAA with repair, stress testing with fixed defect on 7/5/2022, left and right outer upper abdominal discomfort, dehydration with poor fluid consumption, constipation, hypertension, hypertriglyceridemia, and DM-2.    Patient has been having discomfort to his abdomen for multiple years.  Has been evaluated at [place] multiple times.  Stress testing completed on 10/31/2017 which was unremarkable on 1/29/2019 while at [place], and most recently on 7/5/2022.  He was noted of a fixed defect without reversible component.  There is no history of stenting or bypass.  He has not had heart attack previously.  It is likely that this testing is showing artifact.    He is describing atypical discomfort.  Randomly, he will have discomfort on his right upper outer and left upper outer quadrants.  Describes a sharp pain lasting only a few seconds.  Symptoms are nonexertional and random.  He describes hard stools with poor fluid consumption.  He has had work-up multiple times and describes this discomfort as electric shock with some fatigue.  He has been set up for stress testing and echocardiogram and here in follow-up.    IMAGING RESULTS:  ECHO on 7/21/22:  Global and regional left ventricular function is normal with an EF of 55-60%.  Borderline right ventricular enlargement.  Both atria appear normal.  Trace tricuspid insufficiency is present.  Ascending aorta 4.04 cm.    Stress test on 7/5/22:     The nuclear stress test is abnormal. There is a small area of a   moderate degree of infarction in the apical and inferolateral segment(s)   of the left ventricle. The left ventricular ejection fraction  at rest is   62%.  The left ventricular ejection fraction at stress is 52%.      The patient's exercise capacity is average.      A prior study was conducted on 10/30/2017.   Apical lateral fixed abnormality consistent with previous infarct.     Abnormally low left ventricular ejection fraction on the stress study     US carotids on 6/10/22:  No ultrasound evidence of hemo-dynamically significant stenosis.   Minimal atherosclerotic plaque in the right carotid bulb.  Irregular heart beat.      CURRENT MEDICATIONS:   Prior to Admission medications    Medication Sig Start Date End Date Taking? Authorizing Provider   allopurinol (ZYLOPRIM) 100 MG tablet Take 1 tablet (100 mg) by mouth 2 times daily 10/11/22   Usha Gray NP   ASPIRIN EC PO Take 81 mg by mouth daily    Reported, Patient   cetirizine (ZYRTEC) 10 MG tablet Take 10 mg by mouth daily    Reported, Patient   Cholecalciferol (VITAMIN D3 PO) Take 5,000 Units by mouth daily 2000 to 5000    Reported, Patient   ezetimibe (ZETIA) 10 MG tablet Take 1 tablet (10 mg) by mouth daily 6/7/22 9/5/22  Usha Gray NP   hydrochlorothiazide (HYDRODIURIL) 25 MG tablet Take 1 tablet (25 mg) by mouth daily 12/22/21   Moise Kelley MD   METFORMIN HCL PO Take 500 mg by mouth 2 times daily (with meals)     Reported, Patient   Multiple Minerals (CALCIUM-MAGNESIUM-ZINC) TABS Take 1 tablet by mouth daily    Reported, Patient   Omega-3 Fatty Acids (OMEGA-3 FISH OIL PO) Take 1,200 mg by mouth daily     Reported, Patient   ONETOUCH ULTRA test strip TEST ONCE A DAY 6/21/22   Usha Gray NP   potassium chloride ER (KLOR-CON) 10 MEQ CR tablet Take 1 tablet (10 mEq) by mouth daily 3/18/22   Maegan Jerez MD   pravastatin (PRAVACHOL) 10 MG tablet 1 tablet 2 times weekly.  Patient taking differently: 10 mg once a week Patient takes 1 tablet every Monday - 10 mg 12/1/21   Usha Gray NP   triamcinolone (KENALOG) 0.1 % external ointment Apply topically 2 times  daily 10/10/22   Usha Gray, THOMAS       ALLERGIES:   Allergies   Allergen Reactions     Pseudoephedrine Hcl      Sudafed      Simvastatin GI Disturbance     Sulfa Drugs Nausea     Chinese Ink Other (See Comments)        PAST MEDICAL HISTORY:   Past Medical History:   Diagnosis Date     Aneurysm of thoracic aorta      Diabetes (H)      External thrombosed hemorrhoids      Hiatal hernia      Hypertension      Need for prophylactic vaccination and inoculation against unspecified single bacterial disease      Other chest pain         PAST SURGICAL HISTORY:   Past Surgical History:   Procedure Laterality Date     ABDOMINAL AORTIC ANEURYSM REPAIR       BACK SURGERY      laminectomy L5S1     COLONOSCOPY       COLONOSCOPY N/A 10/14/2016    Procedure: COLONOSCOPY;  Surgeon: Daljit Salazar MD;  Location: [facility]     FINGER GANGLION CYST EXCISION       L5 S1 Laminectomy  1991     ROTATOR CUFF REPAIR RT/LT  2006     SHOULDER SURGERY Right     replacement     TONSILLECTOMY  1971        FAMILY HISTORY:   Family History   Problem Relation Age of Onset     Depression Mother      Thyroid Disease Mother      Diabetes Father      Prostate Cancer Father      Breast Cancer No family hx of      Colon Cancer No family hx of         SOCIAL HISTORY:   Social History     Socioeconomic History     Marital status:    Tobacco Use     Smoking status: Former     Types: Cigarettes     Quit date: 1985     Years since quittin.8     Smokeless tobacco: Never   Substance and Sexual Activity     Alcohol use: Yes     Alcohol/week: 0.0 standard drinks     Comment: 3 beers daily      Drug use: Never     Comment: medical marijuana   Other Topics Concern     Caffeine Concern Yes     Comment: Coffee, 2 cups daily    Social History Narrative    2021: Lives in [place], one daughter, , retired, worked as            ROS:   CONSTITUTIONAL: No weight loss, fever, chills, weakness or fatigue.    HEENT: Eyes: No visual changes. Ears, Nose, Throat: No hearing loss, congestion or difficulty swallowing.   CARDIOVASCULAR: No chest pain, chest pressure or chest discomfort. No palpitations or lower extremity edema.   RESPIRATORY: No shortness of breath, dyspnea upon exertion, cough or sputum production.   GASTROINTESTINAL: No abdominal pain. No anorexia, nausea, vomiting or diarrhea.   NEUROLOGICAL: No headache, lightheadedness, dizziness, syncope, ataxia or weakness.   HEMATOLOGIC: No anemia, bleeding or bruising.   PSYCHIATRIC: No history of depression or anxiety.   ENDOCRINOLOGIC: No reports of sweating, cold or heat intolerance. No polyuria or polydipsia.   SKIN: No abnormal rashes or itching.       PHYSICAL EXAM:   GENERAL: The patient is a well-developed, well-nourished, in no apparent distress. Alert and oriented x3.   HEENT: Head is normocephalic and atraumatic. Eyes are symmetrical with normal visual tracking.  HEART: Regular rate and rhythm, S1S2 present without murmur, rub or gallop.   LUNGS: Respirations regular and unlabored. Clear to auscultation.   EXTREMITIES: No peripheral edema present.   NEUROLOGIC: Alert and oriented X3.    SKIN: No jaundice. No rashes or visible skin lesions present.        LAB RESULTS:   Transferred Records on 10/11/2022   Component Date Value Ref Range Status     RETINOPATHY 10/11/2022 NEGATIVE   Final   Office Visit on 09/08/2022   Component Date Value Ref Range Status     Estimated Average Glucose 09/08/2022 140  mg/dL Final     Hemoglobin A1C 09/08/2022 6.5 (H)  0.0 - 5.6 % Final     Sodium 09/08/2022 138  133 - 144 mmol/L Final     Potassium 09/08/2022 3.7  3.4 - 5.3 mmol/L Final     Chloride 09/08/2022 104  94 - 109 mmol/L Final     Carbon Dioxide (CO2) 09/08/2022 28  20 - 32 mmol/L Final     Anion Gap 09/08/2022 6  3 - 14 mmol/L Final     Urea Nitrogen 09/08/2022 18  7 - 30 mg/dL Final     Creatinine 09/08/2022 0.87  0.66 - 1.25 mg/dL Final     Calcium 09/08/2022 9.1   8.5 - 10.1 mg/dL Final     Glucose 09/08/2022 119 (H)  70 - 99 mg/dL Final     GFR Estimate 09/08/2022 >90  >60 mL/min/1.73m2 Final     Creatinine Urine mg/dL 09/08/2022 223  mg/dL Final     Albumin Urine mg/L 09/08/2022 19  mg/L Final     Albumin Urine mg/g Cr 09/08/2022 8.52  0.00 - 17.00 mg/g Cr Final     Cholesterol 09/08/2022 133  <200 mg/dL Final     Triglycerides 09/08/2022 199 (H)  <150 mg/dL Final     Direct Measure HDL 09/08/2022 30 (L)  >=40 mg/dL Final     LDL Cholesterol Calculated 09/08/2022 63  <=100 mg/dL Final     Non HDL Cholesterol 09/08/2022 103  <130 mg/dL Final          ASSESSMENT:       ICD-10-CM    1. Other chest pain  R07.89       2. Abnormal cardiovascular stress test on 7/5/2022  R94.39       3. Aneurysm of ascending aorta without rupture  I71.21       4. Cough, unspecified type  R05.9 X-ray Chest 2 vws*      5. Abdominal discomfort  R10.9       6. Constipation  K59.01       7. Impaired hydration  E87.8       8. Dehydration  E86.0       9. Essential hypertension  I10       10. History of AAA (abdominal aortic aneurysm) repair  Z98.890       11. History of repair of aneurysm of abdominal aorta using endovascular stent graft  Z95.828       12. Hypertriglyceridemia  E78.1       13. Controlled type 2 diabetes mellitus without complication, without long-term current use of insulin (H)  E11.9             PLAN:   1.  Atypical chest discomfort.  I suspect his bilateral sharp pain to his abdomen is related to constipation from dehydration.  He only drinks around 20 ounces of water a day.  Drinks an occasional pop.  Stools are hard and hard to move.  Have encouraged to increase his fluid intake.  Have encouraged him to drink 8 glasses of water a day.  I suspect his stress test is artifact as there is no history of heart attack, stenting, or bypass.  2.  AAA: Would recommend he have an ultrasound of his abdomen for AAA on a regular basis.  3.  Cough: He describes some epigastric and inferior sternal  discomfort which I believe is acid reflux.  He does not complain of traditional GERD symptoms but I believe his cough potentially is coming from acid entering his esophagus.  Suggested H2 blocker versus PPI but declined.  4.  Dehydration: Increase fluid consumption and minimize caffeine intake.  5.  TAAA: Mild.  Should have an echocardiogram in the next 1 to 2 years.  6.  Follow-up in the future on as-needed basis.    Total time spent on day of visit, including review of tests, obtaining/reviewing separately obtained history, ordering medications/tests/procedures, communicating with PCP/consultants, and documenting in electronic medical record: 60 minutes.         Thank you for allowing me to participate in the care of your patient. Please do not hesitate to contact me if you have any questions.     Robinson Bell, DO

## 2022-10-24 NOTE — PATIENT INSTRUCTIONS
Thank you for allowing Dr. Bell and our  team to participate in your care. Please call our office at [phone] with scheduling questions or if you need to cancel or change your appointment. With any other questions or concerns you may call Cecilia cardiology nurse at [phone].       If you experience chest pain, chest pressure, chest tightness, shortness of breath, fainting, lightheadedness, nausea, vomiting, or other concerning symptoms, please report to the Emergency Department or call 911. These symptoms may be emergent, and best treated in the Emergency Department.        Chest xray today- we will call you with the results.     Follow up as needed.

## 2022-10-27 ENCOUNTER — HOSPITAL ENCOUNTER (OUTPATIENT)
Dept: ULTRASOUND IMAGING | Facility: HOSPITAL | Age: 72
Discharge: HOME | End: 2022-10-27
Attending: NURSE PRACTITIONER | Admitting: NURSE PRACTITIONER
Payer: MEDICARE

## 2022-10-27 DIAGNOSIS — E04.1 THYROID NODULE: ICD-10-CM

## 2022-10-27 PROCEDURE — 76536 US EXAM OF HEAD AND NECK: CPT

## 2022-10-28 DIAGNOSIS — E04.1 LEFT THYROID NODULE: ICD-10-CM

## 2022-10-28 DIAGNOSIS — E04.1 RIGHT THYROID NODULE: Primary | ICD-10-CM

## 2022-11-04 DIAGNOSIS — E11.9 CONTROLLED TYPE 2 DIABETES MELLITUS WITHOUT COMPLICATION, WITHOUT LONG-TERM CURRENT USE OF INSULIN (H): Primary | ICD-10-CM

## 2022-11-04 NOTE — TELEPHONE ENCOUNTER
Metformin 500 mg      Last Written Prescription Date:  Patient reported  [facility]  Last Office Visit: 9/9/22    Future Office visit:    Next 5 appointments (look out 90 days)    Andrew 10, 2023  1:30 PM  (Arrive by 1:15 PM)  Return Visit with Shanice Robledo NP  [facility] ([facility] ) [address] [place] [address]  [phone]           Routing refill request to provider for review/approval because:  Medication is reported/historical

## 2022-11-08 ENCOUNTER — HOSPITAL ENCOUNTER (EMERGENCY)
Facility: HOSPITAL | Age: 72
Discharge: HOME | End: 2022-11-08
Attending: NURSE PRACTITIONER | Admitting: NURSE PRACTITIONER
Payer: MEDICARE

## 2022-11-08 VITALS
DIASTOLIC BLOOD PRESSURE: 84 MMHG | RESPIRATION RATE: 20 BRPM | HEART RATE: 82 BPM | OXYGEN SATURATION: 94 % | TEMPERATURE: 96.9 F | SYSTOLIC BLOOD PRESSURE: 131 MMHG

## 2022-11-08 DIAGNOSIS — L72.9 INFECTED CYST OF SKIN: ICD-10-CM

## 2022-11-08 DIAGNOSIS — L08.9 INFECTED CYST OF SKIN: ICD-10-CM

## 2022-11-08 PROCEDURE — 10060 I&D ABSCESS SIMPLE/SINGLE: CPT

## 2022-11-08 PROCEDURE — 999N000104 HC STATISTIC NO CHARGE

## 2022-11-08 PROCEDURE — 10060 I&D ABSCESS SIMPLE/SINGLE: CPT | Performed by: NURSE PRACTITIONER

## 2022-11-08 PROCEDURE — 87070 CULTURE OTHR SPECIMN AEROBIC: CPT | Performed by: NURSE PRACTITIONER

## 2022-11-08 RX ORDER — CLINDAMYCIN HCL 150 MG
300 CAPSULE ORAL 3 TIMES DAILY
Qty: 18 CAPSULE | Refills: 0 | Status: SHIPPED | OUTPATIENT
Start: 2022-11-08 | End: 2022-11-11

## 2022-11-08 ASSESSMENT — ENCOUNTER SYMPTOMS
COLOR CHANGE: 1
DIARRHEA: 0
NAUSEA: 0
HEADACHES: 0
DIZZINESS: 0
ACTIVITY CHANGE: 1
LIGHT-HEADEDNESS: 0
SHORTNESS OF BREATH: 0
CHILLS: 0
COUGH: 1
FEVER: 0
VOMITING: 0

## 2022-11-08 NOTE — ED TRIAGE NOTES
Patient presents to urgent care for lump on upper middle back. Patient States the lump has grown in sizes in the past couple days. Patient states it's painful and itchy. Pain is annoying.

## 2022-11-08 NOTE — ED PROVIDER NOTES
History     Chief Complaint   Patient presents with     Cyst     HPI  Scott Akhtar is a 72 year old male who presents with inflamed, red, painful cyst on his back.  Has had a cyst on his back for a very long time.  Had third COVID-vaccine booster November 2 and 3 days later the cyst started to bother him.  Has doubled in size overnight.  No OTC medications have been taken.  Has applied antibiotic ointment without relief of his symptoms.  Quit smoking 1988.  Denies fevers, chills, nausea, vomiting, diarrhea, shortness of breath, and headaches.    Allergies:  Allergies   Allergen Reactions     Pseudoephedrine Hcl      Sudafed      Simvastatin GI Disturbance     Sulfa Drugs Nausea     Chinese Ink Other (See Comments)       Problem List:    Patient Active Problem List    Diagnosis Date Noted     Other chest pain 10/24/2022     Priority: Medium     Abnormal cardiovascular stress test on 7/5/2022 10/24/2022     Priority: Medium     Cough, unspecified type 10/24/2022     Priority: Medium     Abdominal discomfort 10/24/2022     Priority: Medium     Constipation 10/24/2022     Priority: Medium     Impaired hydration 10/24/2022     Priority: Medium     Aneurysm of ascending aorta without rupture 10/24/2022     Priority: Medium     History of AAA (abdominal aortic aneurysm) repair 10/24/2022     Priority: Medium     Dehydration 10/24/2022     Priority: Medium     Hypertriglyceridemia 10/24/2022     Priority: Medium     Subacute cutaneous lupus erythematosus-rheum did not feel he had systemic lupud 11/30/2021     Priority: Medium     Presence of right artificial shoulder joint 06/15/2021     Priority: Medium     Adverse effect of antihyperlipidemic drug, sequela 12/30/2020     Priority: Medium     Hepatic steatosis 08/17/2020     Priority: Medium     Hyperlipidemia associated with type 2 diabetes mellitus (H) 08/03/2020     Priority: Medium     PAD (peripheral artery disease) (H) 01/31/2020     Priority: Medium      "Disorder of joint prosthesis (H) 02/28/2019     Priority: Medium     Overview:   Added automatically from request for surgery [id number]       Lumbar radiculopathy 10/06/2017     Priority: Medium     Muscle weakness 10/06/2017     Priority: Medium     History of repair of aneurysm of abdominal aorta using endovascular stent graft 01/26/2017     Priority: Medium     Presence of other vascular implants and grafts 01/26/2017     Priority: Medium     Gout 08/04/2016     Priority: Medium     Obesity with body mass index 30 or greater 07/28/2016     Priority: Medium     Controlled type 2 diabetes mellitus without complication, without long-term current use of insulin (H) 07/27/2016     Priority: Medium     4/15/13:  A1c 6.5  10/13/2020- Diabetes without diabetic retinopathy of both eyes.  Overview:   Overview:   4/15/13:  A1c 6.5       Dermatitis herpetiformis 07/27/2016     Priority: Medium     Overview:   Diagnosed while in the .  Started after visiting [facility] in 1971.  Treats with dapsone four times a week; when he cuts down on the dose he gets a breakout of little water blisters on the backs of his hands, elbows, and knees.  Overview:   Overview:   Diagnosed while in the .  Started after visiting [facility] in 1971.  Treats with dapsone four times a week; when he cuts down on the dose he gets a breakout of little water blisters on the backs of his hands, elbows, and knees.       Essential hypertension 07/27/2016     Priority: Medium     Degenerative scoliosis in adult patient 07/27/2016     Priority: Medium     Sensorineural hearing loss (SNHL) 06/26/2013     Priority: Medium     Diaphragmatic hernia without obstruction or gangrene 12/19/2012     Priority: Medium     Overview:   Overview:   12/19/12, \"small esophageal hiatal hernia\" noted on CT abdomen/pelvis.       Gastroesophageal reflux disease without esophagitis 01/13/2005     Priority: Medium        Past Medical History:    Past Medical History: "   Diagnosis Date     Aneurysm of thoracic aorta      Diabetes (H)      External thrombosed hemorrhoids      Hiatal hernia      Hypertension      Need for prophylactic vaccination and inoculation against unspecified single bacterial disease      Other chest pain        Past Surgical History:    Past Surgical History:   Procedure Laterality Date     ABDOMINAL AORTIC ANEURYSM REPAIR       BACK SURGERY      laminectomy L5S1     COLONOSCOPY       COLONOSCOPY N/A 10/14/2016    Procedure: COLONOSCOPY;  Surgeon: Daljit Salazar MD;  Location: [facility]     FINGER GANGLION CYST EXCISION       L5 S1 Laminectomy  1991     ROTATOR CUFF REPAIR RT/LT  2006     SHOULDER SURGERY Right     replacement     TONSILLECTOMY  1971       Family History:    Family History   Problem Relation Age of Onset     Depression Mother      Thyroid Disease Mother      Diabetes Father      Prostate Cancer Father      Breast Cancer No family hx of      Colon Cancer No family hx of        Social History:  Marital Status:   [2]  Social History     Tobacco Use     Smoking status: Former     Types: Cigarettes     Quit date: 1985     Years since quittin.8     Smokeless tobacco: Never   Substance Use Topics     Alcohol use: Yes     Alcohol/week: 0.0 standard drinks     Comment: 3 beers daily      Drug use: Never     Comment: medical marijuana        Medications:    allopurinol (ZYLOPRIM) 100 MG tablet  ASPIRIN EC PO  cetirizine (ZYRTEC) 10 MG tablet  Cholecalciferol (VITAMIN D3 PO)  clindamycin (CLEOCIN) 150 MG capsule  hydrochlorothiazide (HYDRODIURIL) 25 MG tablet  metFORMIN (GLUCOPHAGE) 500 MG tablet  Multiple Minerals (CALCIUM-MAGNESIUM-ZINC) TABS  Omega-3 Fatty Acids (OMEGA-3 FISH OIL PO)  ONETOUCH ULTRA test strip  potassium chloride ER (KLOR-CON) 10 MEQ CR tablet  pravastatin (PRAVACHOL) 10 MG tablet  triamcinolone (KENALOG) 0.1 % external ointment  ezetimibe (ZETIA) 10 MG tablet          Review of Systems    Constitutional: Positive for activity change. Negative for chills and fever.   HENT:        History of thyroid lesions   Respiratory: Positive for cough (chronic). Negative for shortness of breath.    Gastrointestinal: Negative for diarrhea, nausea and vomiting.   Skin: Positive for color change.        Cyst on back for multiple years   Neurological: Negative for dizziness, light-headedness and headaches.       Physical Exam   BP: 131/84  Pulse: 82  Temp: 96.9  F (36.1  C)  Resp: 20  SpO2: 94 %      Physical Exam  Vitals and nursing note reviewed.   Constitutional:       General: He is in acute distress.      Appearance: He is overweight.   Cardiovascular:      Rate and Rhythm: Normal rate.   Pulmonary:      Effort: Pulmonary effort is normal.   Skin:     General: Skin is warm and dry.      Findings: Abscess and erythema (mild to moderate, cyst on back) present. No bruising.          Neurological:      Mental Status: He is alert and oriented to person, place, and time.   Psychiatric:         Behavior: Behavior normal.         ED Course                 [facility]    PROCEDURE: -Incision/Drainage    Date/Time: 11/8/2022 4:15 PM  Performed by: Mell Ramos CNP  Authorized by: Mell Ramos CNP     Risks, benefits and alternatives discussed.      LOCATION:      Type:  Cyst    Size:  2 cm    Location:  Trunk    Trunk location:  Back    PRE-PROCEDURE DETAILS:     Skin preparation:  Betadine    PROCEDURE TYPE:     Complexity:  Simple    ANESTHESIA (see MAR for exact dosages):     Anesthesia method:  Local infiltration    Local anesthetic:  Lidocaine 1% WITH epi and lidocaine 2% w/o epi (2.5 ml)    PROCEDURE DETAILS:     Needle aspiration: no      Incision types:  Cruciate    Incision depth:  Dermal    Scalpel blade:  11    Wound management:  Probed and deloculated and irrigated with saline    Drainage:  Purulent    Drainage amount:  Moderate    Wound treatment:  Drain placed and wound left  "open    Packing materials:  1/4 in iodoform gauze and 1/4 in gauze    Amount 1/4\":  One inch    PROCEDURE  Describe Procedure: Consent obtained. Time out completed. Mid back Region cleaned with chloraprep.Cruiciate incision of 2 mm x 2 mm made  with #11 blade and area probed and deloculated. Small to moderate amount of thick white along with small amount purulent secretions obtained. Culture obtained. Irrigated with 10 ml NS. One inch of 1/4th inch packing placed in wound and dry dressing placed.       Patient Tolerance:  Patient tolerated the procedure well with no immediate complications               No results found for this or any previous visit (from the past 24 hour(s)).    Medications - No data to display    Assessments & Plan (with Medical Decision Making)     I have reviewed the nursing notes.    I have reviewed the findings, diagnosis, plan and need for follow up with the patient.  (L72.9,  L08.9) Infected cyst of skin  Comment: 72 year old male who presents with inflamed, red, painful cyst on his back.  Has had a cyst on his back for a very long time.  Had third COVID-vaccine booster November 2 and 3 days later the cyst started to bother him.  Has doubled in size overnight.  No OTC medications have been taken.  Has applied antibiotic ointment without relief of his symptoms.  Quit smoking 1988.  Denies fevers, chills, nausea, vomiting, diarrhea, shortness of breath, and headaches.    MDM; 2 cm mildly swollen, erythematous cyst right mid back, lateral to spine  See procedure note for incision and drainage.  Wound culture sent    Plan: Clindamycin 3 times daily for 3 days.  Education provided and/or discussed for this/these medication and incision and drainage of abscess.  -Increase fluids.   -Complete all antibiotics even if feeling better.    -Taking antibiotics with food may decrease the symptoms, of an upset stomach, that can occur when taking antibiotics. Antibiotics frequently cause diarrhea. " Probiotics or yogurt may help prevent or decrease these symptoms.   -Return to be reevaluated or follow-up with primary care provider if symptoms do not improve, or worsen.  These discharge instructions and medications were reviewed with him and understanding verbalized.    This document was prepared using a combination of typing and voice generated software.  While every attempt was made for accuracy, spelling and grammatical errors may exist.    Discharge Medication List as of 11/8/2022  1:58 PM      START taking these medications    Details   clindamycin (CLEOCIN) 150 MG capsule Take 2 capsules (300 mg) by mouth 3 times daily for 3 days, Disp-18 capsule, R-0, E-Prescribe             Final diagnoses:   Infected cyst of skin       11/8/2022   [facility]       Mell Ramos, CNP  11/08/22 2621

## 2022-11-08 NOTE — ED TRIAGE NOTES
Pt reports that he noticed a lump on his middle back. He states that it has grown in size over the past couple days.

## 2022-11-08 NOTE — DISCHARGE INSTRUCTIONS
-Increase fluids.   -Complete all antibiotics even if feeling better.    -Taking antibiotics with food may decrease the symptoms, of an upset stomach, that can occur when taking antibiotics. Antibiotics frequently cause diarrhea. Probiotics or yogurt may help prevent or decrease these symptoms.   -Return to be reevaluated or follow-up with primary care provider if symptoms do not improve, or worsen.

## 2022-11-09 RX ORDER — DEXTROSE MONOHYDRATE 25 G/50ML
25-50 INJECTION, SOLUTION INTRAVENOUS
Status: CANCELLED | OUTPATIENT
Start: 2022-11-09

## 2022-11-09 RX ORDER — LIDOCAINE 40 MG/G
CREAM TOPICAL
Status: CANCELLED | OUTPATIENT
Start: 2022-11-09

## 2022-11-09 RX ORDER — LIDOCAINE HYDROCHLORIDE 10 MG/ML
10 INJECTION, SOLUTION EPIDURAL; INFILTRATION; INTRACAUDAL; PERINEURAL ONCE
Status: CANCELLED | OUTPATIENT
Start: 2022-11-10

## 2022-11-09 RX ORDER — SODIUM CHLORIDE 9 MG/ML
INJECTION, SOLUTION INTRAVENOUS CONTINUOUS PRN
Status: CANCELLED | OUTPATIENT
Start: 2022-11-09

## 2022-11-09 RX ORDER — NICOTINE POLACRILEX 4 MG
15-30 LOZENGE BUCCAL
Status: CANCELLED | OUTPATIENT
Start: 2022-11-09

## 2022-11-10 ENCOUNTER — HOSPITAL ENCOUNTER (OUTPATIENT)
Facility: HOSPITAL | Age: 72
Discharge: HOME | End: 2022-11-10
Attending: RADIOLOGY | Admitting: RADIOLOGY
Payer: MEDICARE

## 2022-11-10 ENCOUNTER — HOSPITAL ENCOUNTER (OUTPATIENT)
Dept: ULTRASOUND IMAGING | Facility: HOSPITAL | Age: 72
Discharge: HOME | End: 2022-11-10
Attending: NURSE PRACTITIONER
Payer: MEDICARE

## 2022-11-10 VITALS
RESPIRATION RATE: 16 BRPM | HEART RATE: 71 BPM | DIASTOLIC BLOOD PRESSURE: 83 MMHG | OXYGEN SATURATION: 93 % | SYSTOLIC BLOOD PRESSURE: 116 MMHG

## 2022-11-10 DIAGNOSIS — E04.1 RIGHT THYROID NODULE: ICD-10-CM

## 2022-11-10 DIAGNOSIS — E04.1 LEFT THYROID NODULE: ICD-10-CM

## 2022-11-10 PROCEDURE — 88172 CYTP DX EVAL FNA 1ST EA SITE: CPT | Mod: TC | Performed by: NURSE PRACTITIONER

## 2022-11-10 PROCEDURE — 10005 FNA BX W/US GDN 1ST LES: CPT | Mod: 76

## 2022-11-10 PROCEDURE — 10006 FNA BX W/US GDN EA ADDL: CPT

## 2022-11-10 PROCEDURE — 10005 FNA BX W/US GDN 1ST LES: CPT

## 2022-11-10 RX ORDER — NICOTINE POLACRILEX 4 MG
15-30 LOZENGE BUCCAL
Status: CANCELLED | OUTPATIENT
Start: 2022-11-10

## 2022-11-10 RX ORDER — LIDOCAINE 40 MG/G
CREAM TOPICAL
Status: CANCELLED | OUTPATIENT
Start: 2022-11-10

## 2022-11-10 RX ORDER — DEXTROSE MONOHYDRATE 25 G/50ML
25-50 INJECTION, SOLUTION INTRAVENOUS
Status: CANCELLED | OUTPATIENT
Start: 2022-11-10

## 2022-11-10 RX ORDER — LIDOCAINE HYDROCHLORIDE 10 MG/ML
10 INJECTION, SOLUTION EPIDURAL; INFILTRATION; INTRACAUDAL; PERINEURAL ONCE
Status: DISCONTINUED | OUTPATIENT
Start: 2022-11-10 | End: 2022-11-11 | Stop reason: HOSPADM

## 2022-11-10 RX ORDER — SODIUM CHLORIDE 9 MG/ML
INJECTION, SOLUTION INTRAVENOUS CONTINUOUS PRN
Status: CANCELLED | OUTPATIENT
Start: 2022-11-10

## 2022-11-10 RX ORDER — LIDOCAINE HYDROCHLORIDE 10 MG/ML
10-20 INJECTION, SOLUTION EPIDURAL; INFILTRATION; INTRACAUDAL; PERINEURAL ONCE
Status: CANCELLED | OUTPATIENT
Start: 2022-11-10 | End: 2022-11-10

## 2022-11-10 ASSESSMENT — ACTIVITIES OF DAILY LIVING (ADL): ADLS_ACUITY_SCORE: 35

## 2022-11-10 NOTE — IP AVS SNAPSHOT
[facility]  [address] [place] [id number]  Phone: [phone]                                    After Visit Summary   11/10/2022    Scott Akhtar   MRN: [id number]           After Visit Summary Signature Page    I have received my discharge instructions, and my questions have been answered. I have discussed any challenges I see with this plan with the nurse or doctor.    ..........................................................................................................................................  Patient/Patient Representative Signature      ..........................................................................................................................................  Patient Representative Print Name and Relationship to Patient    ..................................................               ................................................  Date                                   Time    ..........................................................................................................................................  Reviewed by Signature/Title    ...................................................              ..............................................  Date                                               Time          22EPIC Rev 08/18

## 2022-11-10 NOTE — IP AVS SNAPSHOT
After Visit Summary Template Not Found    This Print Group is only intended to be used in the After Visit Summary and can only be used in a report that uses a released After Visit Summary Template.                       MRN:[id number]                      After Visit Summary   11/10/2022    Scott Akhtar   MRN: [id number]           Visit Information        Provider Department      11/10/2022 12:30 PM [facility]; [facility]; [facility]           Review of your medicines       Notice    This visit is during an admission. Changes to the med list made in this visit will be reflected in the After Visit Summary of the admission.           Protect others around you: Learn how to safely use, store and throw away your medicines at www.disposemymeds.org.       Follow-ups after your visit       Your next 10 appointments already scheduled    Nov 10, 2022 12:45 PM  (Arrive by 12:15 PM)  US BIOPSY THYROID FINE NEEDLE ASPIRATION with [facility], [facility], [facility]  [facility] ([facility] ) [address] [place] [id number]  [phone]   How do I prepare for my exam? (Food and drink instructions)  No Food and Drink Restrictions.    How do I prepare for my exam? (Other instructions)  IF YOUR DOCTOR ALSO PRESCRIBED SEDATION DURING THE EXAM (medicine to help you relax): You will receive separate instructions about driving, eating, and additional tests that may be necessary prior to your exam day.    What should I wear: Wear comfortable clothes.    How long does the exam take: Aspiration (no sedation treatment takes about an hour).  For paracentesis, thoracentesis or sedation plan to spend at least three hours at the hospital.    What should I bring: Bring a list of your medicines, including vitamins, minerals and over-the-counter drugs. It is safest to leave personal items at home. Bring your 's license or photo ID and your insurance card.    Do I need a :  No  is needed.    What do I  need to tell my doctor:  Tell your doctor in advance:  * If you are or may be pregnant.  * If you are taking Coumadin (or any other blood thinners) 5 days prior to the exam for any special instructions.  * If you are diabetic to determine if your insulin needs have to be adjusted for the exam.    What should I do after the exam: Take it easy the rest of the day. You can return to normal activities the next day.    What is this test: This test uses a long, thin needle or tube to remove tissue, fluid, or other cells from your body. Pictures from an ultrasound will guide the needle to the right place. (Ultrasound uses sound waves to create pictures of the body on a video screen. You will not feel the sound waves.)    * A biopsy removes tissue or other cells from the body; we send the tissue or cells to a lab for testing.  * Paracentesis removes fluid from the belly (abdomen) to relieve pressure, to test the fluid or both.  * Thoracentesis removes fluid from the sac around the lungs to relieve pressure, to test the fluid or both.  * Aspiration removes fluid from any part of the body, then the fluid is tested for disease or infection.    Who should I call with questions: If you have any questions, please call the Imaging Department where you will have your exam. Directions, parking instructions, and other information are available on our website, [email or web address].     Andrew 10, 2023  1:30 PM  (Arrive by 1:15 PM)  Return Visit with Shanice Robledo NP  [facility] ([facility] [place] ) [address]  [place] [address]  [phone]      Mar 08, 2023 10:20 AM  (Arrive by 10:05 AM)  SHORT with Usha Gray NP  [facility] [place] ([facility] [place] ) [address]  [place] [address]  [phone]         Care Instructions       Further instructions from your care team         Discharge Instructions for Fine-Needle Thyroid Biopsy  You have had a  procedure called fine-needle thyroid biopsy. This biopsy was done to learn more about a nodule or cyst in your thyroid gland or to find out what might be causing enlargement of your thyroid. During the biopsy, a very thin needle is inserted through the skin into the gland. The needle is used to remove a small amount of tissue from the gland. More than one tissue sample may be done to be sure to get cells from all parts of the nodule. Or the needle might be used to drain fluid from a cyst. Often a special ultrasound probe or transducer is used to help guide the needle to the right place. The tissue or fluid is then studied in a lab.   Home care  Here are some tips to take care of yourself at home:   You will have a small adhesive bandage on your biopsy site. Leave the bandage on for 4 to 6 hours. After this time, you don't need to keep it covered.   If you feel discomfort after the biopsy, take over-the-counter pain medicine. Don't take aspirin. It's normal to feel sore for a day or two.  Ask your healthcare provider when you can return to work and normal activities. This will likely be the same day as your procedure.  Getting your results  Your biopsy results may take a few days. When the results are ready, your healthcare provider will discuss them with you and tell you what, if anything, needs to be done next.   Follow-up  Make a follow-up appointment as directed by your healthcare provider.  When to call your healthcare provider  Call your healthcare provider right away if you have any of the following:  Bleeding that won t stop  Shortness of breath or trouble breathing  Fever of 100.4 F (38 C) or higher  Increasing pain, redness, tenderness, or drainage at the biopsy site  Swelling of the biopsy site  Be sure you understand what problems you should watch for and know how to reach the healthcare provider after hours and on weekends.   Brandon last reviewed this educational content on 6/1/2018 2000-2021 [employer]  [employer]. All rights reserved. This information is not intended as a substitute for professional medical care. Always follow your healthcare professional's instructions.          Additional Information About Your Visit       [employer] Information    [employer] gives you secure access to your electronic health record. If you see a primary care provider, you can also send messages to your care team and make appointments. If you have questions, please call your primary care clinic.  If you do not have a primary care provider, please call [phone] and they will assist you.       Care EveryWhere ID    This is your Care EveryWhere ID. This could be used by other organizations to access your [facility] medical records  [id number]        Primary Care Provider  Usha Gray NP Office Phone #  [phone] Fax #  [phone]      Equal Access to Services    ANDRI[place] YOON : Hadii karen lutz hadasho Soomaali, waaxda luqadaha, qaybta kaalmada adeegyada, amanda zhang . So [facility] [phone].    ATENCIÓN: Si habla español, tiene a peña disposición servicios gratuitos de asistencia lingüística. Llame al [phone].    We comply with applicable federal and state civil rights laws, including the [place] Human Rights Act. We do not discriminate on the basis of race, color, creed, Mormonism, national origin, marital status, age, disability, sex, sexual orientation, or gender identity.    If you would like an itemization of your charges they will now be available in [employer] 30 days after discharge. To access the itemized statements in [employer] go to billing/billing summary. From there select view account. There will be multiple tabs showing an overview of your account, detail, payments, and communications. From the communications tab you can see your monthly statements, your itemized statements, and any billing letters generated for your account. If you do not have a [employer] account and need  help getting access please contact [employer] at [phone].  If you would prefer to have your itemized statements mailed please contact our automated itemized bill request line at [phone] option  2.       Thank you!    Thank you for choosing [facility] for your care. Our goal is always to provide you with excellent care. Hearing back from our patients is one way we can continue to improve our services. Please take a few minutes to complete the written survey that you may receive in the mail after you visit with us. Thank you!         Medication List      Notice    This visit is during an admission. Changes to the med list made in this visit will be reflected in the After Visit Summary of the admission.

## 2022-11-10 NOTE — PROGRESS NOTES
Procedure: Fine Needle Biopsy, Thyroid, bilateral    There were  no complications and patient has no symptoms..      Tolerated procedure well.    Patient to US.  Consent complete.  Supine on Ultrasound table.      Radiologist:Dr Dalton    Time Out: Prior to the start of the procedure and with procedural staff participation, I verbally confirmed the patient s identity using two indicators, relevant allergies, that the procedure was appropriate and matched the consent or emergent situation, and that the correct equipment/implants were available. Immediately prior to starting the procedure I conducted the Time Out with the procedural staff and re-confirmed the patient s name, procedure, and site/side. (The Joint Commission universal protocol was followed.)  Yes    Position:  supine    Pain:  0    Sedation:None. Local Anesthestic used  No sedation    Estimated Blood Loss: None     Condition: Stable    Comments: See dictated procedure note for full details     Ashlyn Bermudez RN

## 2022-11-10 NOTE — DISCHARGE INSTRUCTIONS
Discharge Instructions for Fine-Needle Thyroid Biopsy  You have had a procedure called fine-needle thyroid biopsy. This biopsy was done to learn more about a nodule or cyst in your thyroid gland or to find out what might be causing enlargement of your thyroid. During the biopsy, a very thin needle is inserted through the skin into the gland. The needle is used to remove a small amount of tissue from the gland. More than one tissue sample may be done to be sure to get cells from all parts of the nodule. Or the needle might be used to drain fluid from a cyst. Often a special ultrasound probe or transducer is used to help guide the needle to the right place. The tissue or fluid is then studied in a lab.   Home care  Here are some tips to take care of yourself at home:   You will have a small adhesive bandage on your biopsy site. Leave the bandage on for 4 to 6 hours. After this time, you don't need to keep it covered.   If you feel discomfort after the biopsy, take over-the-counter pain medicine. Don't take aspirin. It's normal to feel sore for a day or two.  Ask your healthcare provider when you can return to work and normal activities. This will likely be the same day as your procedure.  Getting your results  Your biopsy results may take a few days. When the results are ready, your healthcare provider will discuss them with you and tell you what, if anything, needs to be done next.   Follow-up  Make a follow-up appointment as directed by your healthcare provider.  When to call your healthcare provider  Call your healthcare provider right away if you have any of the following:  Bleeding that won t stop  Shortness of breath or trouble breathing  Fever of 100.4 F (38 C) or higher  Increasing pain, redness, tenderness, or drainage at the biopsy site  Swelling of the biopsy site  Be sure you understand what problems you should watch for and know how to reach the healthcare provider after hours and on weekends.    Brandon last reviewed this educational content on 6/1/2018 2000-2021 [employer]. All rights reserved. This information is not intended as a substitute for professional medical care. Always follow your healthcare professional's instructions.

## 2022-11-11 ENCOUNTER — TELEPHONE (OUTPATIENT)
Dept: INTERVENTIONAL RADIOLOGY/VASCULAR | Facility: HOSPITAL | Age: 72
End: 2022-11-11

## 2022-11-11 NOTE — PROVIDER NOTIFICATION
Pt stated left side of neck with bruising and swelling. States right side with no bruise or swelling.

## 2022-11-12 LAB
BACTERIA WND CULT: ABNORMAL
GRAM STAIN RESULT: ABNORMAL

## 2022-11-17 LAB
PATH REPORT.COMMENTS IMP SPEC: NORMAL
PATH REPORT.FINAL DX SPEC: NORMAL
PATH REPORT.GROSS SPEC: NORMAL
PATH REPORT.MICROSCOPIC SPEC OTHER STN: NORMAL

## 2022-11-17 PROCEDURE — 88173 CYTOPATH EVAL FNA REPORT: CPT | Mod: 26 | Performed by: PATHOLOGY

## 2022-11-17 PROCEDURE — 88172 CYTP DX EVAL FNA 1ST EA SITE: CPT | Mod: 26 | Performed by: PATHOLOGY

## 2022-11-17 NOTE — PROGRESS NOTES
Assessment & Plan     1. Infected cyst of skin  Dressing instructions reviewed.   - clindamycin (CLEOCIN) 300 MG capsule; Take 1 capsule (300 mg) by mouth 3 times daily for 10 days  Dispense: 30 capsule; Refill: 0  - mupirocin (BACTROBAN) 2 % external cream; Apply topically 3 times daily  Dispense: 30 g; Refill: 1  - Adult General Surg Referral - for excision.     Follow-up as needed     Coco Penn, NP  [facility] - CALLUM Chavez is a 72 year old, presenting for the following health issues: he is here today with spouse.    Urgent Care (Follow up  )      HPI     Concern - follow up cyst - back  Onset: years  Description: cyst lanced at  on 11/8/22  Intensity: moderate- itching   Progression of Symptoms:  Improving but not resolving.   Accompanying Signs & Symptoms: itching, drainage (clear -white in color), redness continues    Previous history of similar problem: no   Precipitating factors:        Worsened by: no   Alleviating factors:        Improved by: lancing at    Therapies tried and outcome: dressing changes- daily at home, course of clindamycin 3 x daily x 3 days prescribed by  Provider. (therapy completed).  Cyst is getting larger, continues draining and is painful.    Culture reviewed.         Review of Systems   Constitutional, HEENT, cardiovascular, pulmonary, gi and gu systems are negative, except as otherwise noted.      Objective    /78 (BP Location: Left arm, Patient Position: Sitting, Cuff Size: Adult Regular)   Pulse 78   Temp 97.5  F (36.4  C) (Tympanic)   Resp 20   Wt 105.2 kg (232 lb)   SpO2 95%   BMI 34.24 kg/m    Body mass index is 34.24 kg/m .  Physical Exam   GENERAL: healthy, alert and no distress  MS: no gross musculoskeletal defects noted, no edema  SKIN: 2 inch cyst of mid back that is draining clear fluid.  Area is warm, tender with palpation and erythematous.   PSYCH: mentation appears normal, affect normal/bright

## 2022-11-18 ENCOUNTER — OFFICE VISIT (OUTPATIENT)
Dept: FAMILY MEDICINE | Facility: OTHER | Age: 72
End: 2022-11-18
Attending: NURSE PRACTITIONER
Payer: MEDICARE

## 2022-11-18 ENCOUNTER — TELEPHONE (OUTPATIENT)
Dept: FAMILY MEDICINE | Facility: OTHER | Age: 72
End: 2022-11-18

## 2022-11-18 VITALS
SYSTOLIC BLOOD PRESSURE: 114 MMHG | DIASTOLIC BLOOD PRESSURE: 78 MMHG | BODY MASS INDEX: 34.24 KG/M2 | HEART RATE: 78 BPM | WEIGHT: 232 LBS | TEMPERATURE: 97.5 F | RESPIRATION RATE: 20 BRPM | OXYGEN SATURATION: 95 %

## 2022-11-18 DIAGNOSIS — L08.9 INFECTED CYST OF SKIN: Primary | ICD-10-CM

## 2022-11-18 DIAGNOSIS — L72.9 INFECTED CYST OF SKIN: ICD-10-CM

## 2022-11-18 DIAGNOSIS — L72.9 INFECTED CYST OF SKIN: Primary | ICD-10-CM

## 2022-11-18 DIAGNOSIS — L08.9 INFECTED CYST OF SKIN: ICD-10-CM

## 2022-11-18 PROCEDURE — 99024 POSTOP FOLLOW-UP VISIT: CPT | Performed by: NURSE PRACTITIONER

## 2022-11-18 PROCEDURE — G0463 HOSPITAL OUTPT CLINIC VISIT: HCPCS

## 2022-11-18 RX ORDER — MUPIROCIN CALCIUM 20 MG/G
CREAM TOPICAL 3 TIMES DAILY
Qty: 30 G | Refills: 1 | Status: SHIPPED | OUTPATIENT
Start: 2022-11-18 | End: 2023-01-10

## 2022-11-18 RX ORDER — CLINDAMYCIN HCL 300 MG
300 CAPSULE ORAL 3 TIMES DAILY
Qty: 30 CAPSULE | Refills: 0 | Status: SHIPPED | OUTPATIENT
Start: 2022-11-18 | End: 2022-11-28

## 2022-11-18 ASSESSMENT — PAIN SCALES - GENERAL: PAINLEVEL: NO PAIN (0)

## 2022-11-18 NOTE — PATIENT INSTRUCTIONS
Assessment & Plan     1. Infected cyst of skin  Dressing instructions reviewed.   - clindamycin (CLEOCIN) 300 MG capsule; Take 1 capsule (300 mg) by mouth 3 times daily for 10 days  Dispense: 30 capsule; Refill: 0  - mupirocin (BACTROBAN) 2 % external cream; Apply topically 3 times daily  Dispense: 30 g; Refill: 1  - Adult General Surg Referral     Follow-up as needed     Coco Penn NP  [facility]

## 2022-11-18 NOTE — TELEPHONE ENCOUNTER
Received PA from [employer] for Mupirocin Calcium 2% cream. Submitted on CMM. Waiting for a response.

## 2022-11-21 NOTE — TELEPHONE ENCOUNTER
Received APPROVAL from [employer] for Mupirocin Calcium 2% cream. Effective 10/19/2022-11/18/2023. Forms scanned to Epic.

## 2022-11-22 RX ORDER — MUPIROCIN 20 MG/G
OINTMENT TOPICAL 3 TIMES DAILY
Qty: 30 G | Refills: 1 | Status: SHIPPED | OUTPATIENT
Start: 2022-11-22 | End: 2022-12-13

## 2022-11-22 RX ORDER — MUPIROCIN CALCIUM 20 MG/G
CREAM TOPICAL 3 TIMES DAILY
Qty: 30 G | Refills: 1 | OUTPATIENT
Start: 2022-11-22

## 2022-12-13 ENCOUNTER — OFFICE VISIT (OUTPATIENT)
Dept: SURGERY | Facility: OTHER | Age: 72
End: 2022-12-13
Attending: SURGERY
Payer: COMMERCIAL

## 2022-12-13 VITALS
RESPIRATION RATE: 16 BRPM | TEMPERATURE: 96.8 F | BODY MASS INDEX: 33.33 KG/M2 | HEART RATE: 73 BPM | OXYGEN SATURATION: 95 % | HEIGHT: 69 IN | DIASTOLIC BLOOD PRESSURE: 78 MMHG | SYSTOLIC BLOOD PRESSURE: 128 MMHG | WEIGHT: 225 LBS

## 2022-12-13 DIAGNOSIS — L72.9 INFECTED CYST OF SKIN: ICD-10-CM

## 2022-12-13 DIAGNOSIS — L08.9 INFECTED CYST OF SKIN: ICD-10-CM

## 2022-12-13 PROCEDURE — G0463 HOSPITAL OUTPT CLINIC VISIT: HCPCS | Mod: 25

## 2022-12-13 PROCEDURE — 99203 OFFICE O/P NEW LOW 30 MIN: CPT | Performed by: SURGERY

## 2022-12-13 ASSESSMENT — PAIN SCALES - GENERAL: PAINLEVEL: NO PAIN (0)

## 2022-12-13 NOTE — PROGRESS NOTES
CLINIC NOTE - CONSULT  12/13/2022    Patient:Scott Akhtar    Referring Physician: Coco Penn    Reason for Referral: Masses on back and left arm    This is a 72 year old male with a masses on the back and left arm.  The masses are getting larger.  The masses have been infected in the past.  The masses have drained.  The patient does desire excision.     Past Medical History:  Past Medical History:   Diagnosis Date     Aneurysm of thoracic aorta      Diabetes (H)      External thrombosed hemorrhoids      Hiatal hernia      Hypertension      Need for prophylactic vaccination and inoculation against unspecified single bacterial disease      Other chest pain        Past Surgical History:  Past Surgical History:   Procedure Laterality Date     ABDOMINAL AORTIC ANEURYSM REPAIR       BACK SURGERY      laminectomy L5S1     COLONOSCOPY       COLONOSCOPY N/A 10/14/2016    Procedure: COLONOSCOPY;  Surgeon: Daljit Salazar MD;  Location: [facility] OR     FINGER GANGLION CYST EXCISION       L5 S1 Laminectomy  01/01/1991     ROTATOR CUFF REPAIR RT/LT  01/01/2006     SHOULDER SURGERY Right     replacement     TONSILLECTOMY  01/01/1971       Family History History:  Family History   Problem Relation Age of Onset     Depression Mother      Thyroid Disease Mother      Diabetes Father      Prostate Cancer Father      Breast Cancer No family hx of      Colon Cancer No family hx of        History of Tobacco Use:  History   Smoking Status     Former     Types: Cigarettes     Quit date: 1/1/1985   Smokeless Tobacco     Never       Current Medications:  Current Outpatient Medications   Medication Sig Dispense Refill     allopurinol (ZYLOPRIM) 100 MG tablet Take 1 tablet (100 mg) by mouth 2 times daily 60 tablet 4     ASPIRIN EC PO Take 81 mg by mouth daily       cetirizine (ZYRTEC) 10 MG tablet Take 10 mg by mouth daily       Cholecalciferol (VITAMIN D3 PO) Take 5,000 Units by mouth daily 2000 to 5000        "hydrochlorothiazide (HYDRODIURIL) 25 MG tablet Take 1 tablet (25 mg) by mouth daily 90 tablet 1     metFORMIN (GLUCOPHAGE) 500 MG tablet Take 1 tablet (500 mg) by mouth 2 times daily (with meals) 180 tablet 0     Multiple Minerals (CALCIUM-MAGNESIUM-ZINC) TABS Take 1 tablet by mouth daily       mupirocin (BACTROBAN) 2 % external cream Apply topically 3 times daily 30 g 1     Omega-3 Fatty Acids (OMEGA-3 FISH OIL PO) Take 1,200 mg by mouth daily        ONETOUCH ULTRA test strip TEST ONCE A  strip 3     potassium chloride ER (KLOR-CON) 10 MEQ CR tablet Take 1 tablet (10 mEq) by mouth daily 10 tablet 0     pravastatin (PRAVACHOL) 10 MG tablet 1 tablet 2 times weekly. (Patient taking differently: 10 mg once a week Patient takes 1 tablet every Monday - 10 mg) 24 tablet 3     triamcinolone (KENALOG) 0.1 % external ointment Apply topically 2 times daily 30 g 0     ezetimibe (ZETIA) 10 MG tablet Take 1 tablet (10 mg) by mouth daily 90 tablet 0       Allergies:  Allergies   Allergen Reactions     Pseudoephedrine Hcl      Sudafed      Simvastatin GI Disturbance     Sulfa Drugs Nausea     Chinese Ink Other (See Comments)     Privet Other (See Comments)     Any Ink at all causes him to sneeze uncontrollable.        ROS:  Pertinent items are noted in HPI.  All other systems are negative.    PHYSICAL EXAM:     Vital signs: /78 (BP Location: Right arm, Cuff Size: Adult Large)   Pulse 73   Temp 96.8  F (36  C) (Tympanic)   Resp 16   Ht 1.753 m (5' 9\")   Wt 102.1 kg (225 lb)   SpO2 95%   BMI 33.23 kg/m     Weight: [unfilled]   BMI: Body mass index is 33.23 kg/m .   General: Normal, healthy, cooperative, in no acute distress, alert   Skin: no jaundice   HEENT: PERRLA and Sclera clear, anicteric   Neck: supple      On the mid back there is evidence of a draining sebaceous cyst.  Evidence of additional 1 on his back and on his left posterior arm that are not draining.  Not currently showing signs of " infection.    ASSESSMENT: 72 year old male with a masses on the back and left posterior arm/shoulder.    PLAN: Discussed options with the patient.  Will plan on taking the patient to the OR for surgical excision.      The risks, benefits, and alternatives to the planned procedure were fully discussed with the patient and/or the patient's representative(s). The risks of bleeding, infection, death, missing pathology, the need for additional procedures intra-operatively, the possible need for intra-operative consults, the possible need for transfusion therapy, cardiopulmonary compromise, the possible need for additional surgery for a complication were discussed with the patient and/or the patient's representative(s). The patient's and/or patient's representative(s) questions were addressed and answered. Informed consent was obtained from the patient and/or the patient's representative(s). The patient and/or the patient's representative(s) consent to proceed.

## 2022-12-13 NOTE — PATIENT INSTRUCTIONS
Thank you for allowing Dr. Lentz and our surgical team to participate in your care. Please call our health unit coordinator at [phone] with scheduling questions or the nurse at [phone] with any other questions or concerns.    You have been scheduled for EXCISION OF MASSES ON BACK AND LEFT POSTERIOR ARM with  on Monday January 16TH 2023.     Please see handout for additional instruction.    You WILL NOT  need a pre-operative appointment with your primary care provider.  You may call [phone] or [phone] with any questions.

## 2022-12-18 ENCOUNTER — HEALTH MAINTENANCE LETTER (OUTPATIENT)
Age: 72
End: 2022-12-18

## 2023-01-06 ENCOUNTER — ANESTHESIA EVENT (OUTPATIENT)
Dept: SURGERY | Facility: HOSPITAL | Age: 73
End: 2023-01-06
Payer: MEDICARE

## 2023-01-06 ASSESSMENT — LIFESTYLE VARIABLES: TOBACCO_USE: 1

## 2023-01-06 NOTE — ANESTHESIA PREPROCEDURE EVALUATION
Anesthesia Pre-Procedure Evaluation    Patient: Scott Akhtar   MRN: [id number] : 1950        Procedure : Procedure(s):  EXCISION OF MASSES ON BACK AND LEFT POSTERIOR ARM          Past Medical History:   Diagnosis Date     Aneurysm of thoracic aorta      Diabetes (H)      External thrombosed hemorrhoids      Hiatal hernia      Hypertension      Need for prophylactic vaccination and inoculation against unspecified single bacterial disease      Other chest pain       Past Surgical History:   Procedure Laterality Date     ABDOMINAL AORTIC ANEURYSM REPAIR       BACK SURGERY      laminectomy L5S1     COLONOSCOPY       COLONOSCOPY N/A 10/14/2016    Procedure: COLONOSCOPY;  Surgeon: Daljit Salazar MD;  Location: [facility]     FINGER GANGLION CYST EXCISION       L5 S1 Laminectomy  1991     ROTATOR CUFF REPAIR RT/LT  2006     SHOULDER SURGERY Right     replacement     TONSILLECTOMY  1971      Allergies   Allergen Reactions     Pseudoephedrine Hcl      Sudafed      Simvastatin GI Disturbance     Sulfa Drugs Nausea     Chinese Ink Other (See Comments)     Privet Other (See Comments)     Any Ink at all causes him to sneeze uncontrollable.       Social History     Tobacco Use     Smoking status: Former     Types: Cigarettes     Quit date: 1985     Years since quittin.0     Smokeless tobacco: Never   Substance Use Topics     Alcohol use: Yes     Alcohol/week: 0.0 standard drinks     Comment: 3 beers daily       Wt Readings from Last 1 Encounters:   22 102.1 kg (225 lb)        Anesthesia Evaluation   Pt has had prior anesthetic. Type: General and MAC.    History of anesthetic complications   has been agressive waking up from GA.    ROS/MED HX  ENT/Pulmonary:     (+) RAYSA risk factors, hypertension, tobacco use, Past use,     Neurologic: Comment: Sensorineural hearing loss       Cardiovascular: Comment: Aneurysm of thoracic aorta  Endovascular AAA repair with stent graft    (+)  Dyslipidemia hypertension-Peripheral Vascular Disease----Previous cardiac testing   Echo: Date: 7/21/22 Results:  Interpretation Summary  Global and regional left ventricular function is normal with an EF of 55-60%.  Borderline right ventricular enlargement.  Both atria appear normal.  Trace tricuspid insufficiency is present.  Ascending aorta 4.04 cm.  Stress Test: Date: 7/22 Results:   The nuclear stress test is abnormal. There is a small area of a   moderate degree of infarction in the apical and inferolateral segment(s)   of the left ventricle. The left ventricular ejection fraction at rest is   62%.  The left ventricular ejection fraction at stress is 52%.      The patient's exercise capacity is average.      A prior study was conducted on 10/30/2017.     Apical lateral fixed abnormality consistent with previous infarct.     Abnormally low left ventricular ejection fraction on the stress   ECG Reviewed: Date: 10/24/22 Results:  NSR @69  Inferior infarct, anterior infarct  Cath:  Date: Results:      METS/Exercise Tolerance: >4 METS    Hematologic:  - neg hematologic  ROS     Musculoskeletal: Comment: Infected cyst of skin  L5 S1 Laminectomy  Muscle weakness  Degenerative scoliosis   (+) arthritis (Gout),     GI/Hepatic: Comment: External thrombosed hemorrhoids  Diaphragmatic hernia     (+) GERD, hiatal hernia, liver disease (Hepatic steatosis),     Renal/Genitourinary:       Endo:     (+) type II DM, Last HgA1c: 6.5, date: 9/8/22, Not using insulin, - not using insulin pump. Obesity,     Psychiatric/Substance Use:     (+) Recreational drug usage: Cannabis (2 days ago).    Infectious Disease:  - neg infectious disease ROS     Malignancy:  - neg malignancy ROS     Other: Comment: Subacute cutaneous lupus erythematosus     (+) , H/O Chronic Pain (lumbar radiculopathy),        Physical Exam    Airway        Mallampati: III   TM distance: > 3 FB   Neck ROM: full   Mouth opening: > 3 cm    Respiratory Devices and  Support         Dental  no notable dental history         Cardiovascular   cardiovascular exam normal       Rhythm and rate: regular and normal     Pulmonary   pulmonary exam normal        breath sounds clear to auscultation           OUTSIDE LABS:  CBC:   Lab Results   Component Value Date    WBC 5.9 06/07/2022    HGB 14.9 06/07/2022    HCT 46.2 06/07/2022     06/07/2022     BMP:   Lab Results   Component Value Date     09/08/2022     06/07/2022    POTASSIUM 3.7 09/08/2022    POTASSIUM 3.6 06/07/2022    CHLORIDE 104 09/08/2022    CHLORIDE 102 06/07/2022    CO2 28 09/08/2022    CO2 29 06/07/2022    BUN 18 09/08/2022    BUN 13 06/07/2022    CR 0.87 09/08/2022    CR 0.88 06/07/2022     (H) 09/08/2022    GLC 97 06/07/2022     COAGS: No results found for: PTT, INR, FIBR  POC:   Lab Results   Component Value Date     (H) 03/12/2019     HEPATIC:   Lab Results   Component Value Date    ALBUMIN 3.8 06/07/2022    PROTTOTAL 8.2 06/07/2022    ALT 56 06/07/2022    AST 43 06/07/2022    ALKPHOS 57 06/07/2022    BILITOTAL 0.7 06/07/2022     OTHER:   Lab Results   Component Value Date    A1C 6.5 (H) 09/08/2022    SARAH 9.1 09/08/2022    TSH 1.49 06/07/2022       Anesthesia Plan    ASA Status:  3   NPO Status:  NPO Appropriate    Anesthesia Type: General.     - Airway: ETT   Induction: Intravenous, Propofol.   Maintenance: Balanced.        Consents    Anesthesia Plan(s) and associated risks, benefits, and realistic alternatives discussed. Questions answered and patient/representative(s) expressed understanding.     - Discussed: Risks, Benefits and Alternatives for BOTH SEDATION and the PROCEDURE were discussed     - Discussed with:  Patient      - Extended Intubation/Ventilatory Support Discussed: No.      - Patient is DNR/DNI Status: No    Use of blood products discussed: No .     Postoperative Care    Pain management: IV analgesics.   PONV prophylaxis: Ondansetron (or other 5HT-3)      Comments:    Other Comments:  1/10/23  Per surgeon unable to complete procedure with lateral position and MAC. Plan for prone general            LACHO Baltazar CRNA

## 2023-01-09 NOTE — PATIENT INSTRUCTIONS
Thank you for allowing Shanice Robledo and our ENT team to participate in your care.  If your medications are too expensive, please give the nurse a call.  We can possibly change this medication.  If you have a scheduling or an appointment question please contact our Health Unit Coordinator at their direct line [phone].   ALL nursing questions or concerns can be directed to your ENT nurse at: [phone] - Kce     Follow up in 4 months for repeat ear cleaning and ultrasound thyroid.

## 2023-01-10 ENCOUNTER — OFFICE VISIT (OUTPATIENT)
Dept: FAMILY MEDICINE | Facility: OTHER | Age: 73
End: 2023-01-10
Attending: FAMILY MEDICINE
Payer: MEDICARE

## 2023-01-10 ENCOUNTER — OFFICE VISIT (OUTPATIENT)
Dept: OTOLARYNGOLOGY | Facility: OTHER | Age: 73
End: 2023-01-10
Attending: NURSE PRACTITIONER
Payer: MEDICARE

## 2023-01-10 VITALS
TEMPERATURE: 97.3 F | WEIGHT: 225 LBS | HEART RATE: 67 BPM | HEIGHT: 69 IN | BODY MASS INDEX: 33.33 KG/M2 | SYSTOLIC BLOOD PRESSURE: 122 MMHG | DIASTOLIC BLOOD PRESSURE: 74 MMHG | OXYGEN SATURATION: 97 %

## 2023-01-10 VITALS
OXYGEN SATURATION: 95 % | SYSTOLIC BLOOD PRESSURE: 128 MMHG | BODY MASS INDEX: 33.92 KG/M2 | TEMPERATURE: 96.7 F | DIASTOLIC BLOOD PRESSURE: 68 MMHG | WEIGHT: 229.7 LBS | HEART RATE: 66 BPM | RESPIRATION RATE: 18 BRPM

## 2023-01-10 DIAGNOSIS — E66.09 CLASS 1 OBESITY DUE TO EXCESS CALORIES WITH BODY MASS INDEX (BMI) OF 33.0 TO 33.9 IN ADULT, UNSPECIFIED WHETHER SERIOUS COMORBIDITY PRESENT: ICD-10-CM

## 2023-01-10 DIAGNOSIS — Z01.818 PREOPERATIVE EXAMINATION: Primary | ICD-10-CM

## 2023-01-10 DIAGNOSIS — E66.811 CLASS 1 OBESITY DUE TO EXCESS CALORIES WITH BODY MASS INDEX (BMI) OF 33.0 TO 33.9 IN ADULT, UNSPECIFIED WHETHER SERIOUS COMORBIDITY PRESENT: ICD-10-CM

## 2023-01-10 DIAGNOSIS — H61.23 BILATERAL IMPACTED CERUMEN: Primary | ICD-10-CM

## 2023-01-10 DIAGNOSIS — L98.9 BACK SKIN LESION: ICD-10-CM

## 2023-01-10 DIAGNOSIS — E04.1 THYROID NODULE: ICD-10-CM

## 2023-01-10 DIAGNOSIS — E11.9 CONTROLLED TYPE 2 DIABETES MELLITUS WITHOUT COMPLICATION, WITHOUT LONG-TERM CURRENT USE OF INSULIN (H): ICD-10-CM

## 2023-01-10 DIAGNOSIS — I10 ESSENTIAL HYPERTENSION: ICD-10-CM

## 2023-01-10 LAB
ANION GAP SERPL CALCULATED.3IONS-SCNC: 12 MMOL/L (ref 7–15)
BASOPHILS # BLD AUTO: 0 10E3/UL (ref 0–0.2)
BASOPHILS NFR BLD AUTO: 0 %
BUN SERPL-MCNC: 14.1 MG/DL (ref 8–23)
CALCIUM SERPL-MCNC: 9.7 MG/DL (ref 8.8–10.2)
CHLORIDE SERPL-SCNC: 101 MMOL/L (ref 98–107)
CREAT SERPL-MCNC: 0.92 MG/DL (ref 0.67–1.17)
DEPRECATED HCO3 PLAS-SCNC: 28 MMOL/L (ref 22–29)
EOSINOPHIL # BLD AUTO: 0.2 10E3/UL (ref 0–0.7)
EOSINOPHIL NFR BLD AUTO: 3 %
ERYTHROCYTE [DISTWIDTH] IN BLOOD BY AUTOMATED COUNT: 14.9 % (ref 10–15)
GFR SERPL CREATININE-BSD FRML MDRD: 89 ML/MIN/1.73M2
GLUCOSE SERPL-MCNC: 82 MG/DL (ref 70–99)
HCT VFR BLD AUTO: 47.4 % (ref 40–53)
HGB BLD-MCNC: 15.4 G/DL (ref 13.3–17.7)
IMM GRANULOCYTES # BLD: 0 10E3/UL
IMM GRANULOCYTES NFR BLD: 0 %
LYMPHOCYTES # BLD AUTO: 1.4 10E3/UL (ref 0.8–5.3)
LYMPHOCYTES NFR BLD AUTO: 22 %
MCH RBC QN AUTO: 29.1 PG (ref 26.5–33)
MCHC RBC AUTO-ENTMCNC: 32.5 G/DL (ref 31.5–36.5)
MCV RBC AUTO: 89 FL (ref 78–100)
MONOCYTES # BLD AUTO: 0.6 10E3/UL (ref 0–1.3)
MONOCYTES NFR BLD AUTO: 10 %
NEUTROPHILS # BLD AUTO: 4.3 10E3/UL (ref 1.6–8.3)
NEUTROPHILS NFR BLD AUTO: 65 %
NRBC # BLD AUTO: 0 10E3/UL
NRBC BLD AUTO-RTO: 0 /100
PLATELET # BLD AUTO: 191 10E3/UL (ref 150–450)
POTASSIUM SERPL-SCNC: 3.9 MMOL/L (ref 3.4–5.3)
RBC # BLD AUTO: 5.3 10E6/UL (ref 4.4–5.9)
SODIUM SERPL-SCNC: 141 MMOL/L (ref 136–145)
WBC # BLD AUTO: 6.6 10E3/UL (ref 4–11)

## 2023-01-10 PROCEDURE — 82947 ASSAY GLUCOSE BLOOD QUANT: CPT | Mod: ZL | Performed by: FAMILY MEDICINE

## 2023-01-10 PROCEDURE — 69210 REMOVE IMPACTED EAR WAX UNI: CPT | Performed by: NURSE PRACTITIONER

## 2023-01-10 PROCEDURE — G0463 HOSPITAL OUTPT CLINIC VISIT: HCPCS

## 2023-01-10 PROCEDURE — 80051 ELECTROLYTE PANEL: CPT | Mod: ZL | Performed by: FAMILY MEDICINE

## 2023-01-10 PROCEDURE — 99213 OFFICE O/P EST LOW 20 MIN: CPT | Mod: 25 | Performed by: NURSE PRACTITIONER

## 2023-01-10 PROCEDURE — 85025 COMPLETE CBC W/AUTO DIFF WBC: CPT | Mod: ZL | Performed by: FAMILY MEDICINE

## 2023-01-10 PROCEDURE — 99214 OFFICE O/P EST MOD 30 MIN: CPT | Performed by: FAMILY MEDICINE

## 2023-01-10 PROCEDURE — 36415 COLL VENOUS BLD VENIPUNCTURE: CPT | Mod: ZL | Performed by: FAMILY MEDICINE

## 2023-01-10 PROCEDURE — G0463 HOSPITAL OUTPT CLINIC VISIT: HCPCS | Mod: 25

## 2023-01-10 PROCEDURE — G0463 HOSPITAL OUTPT CLINIC VISIT: HCPCS | Mod: 27,25

## 2023-01-10 ASSESSMENT — PAIN SCALES - GENERAL
PAINLEVEL: NO PAIN (0)
PAINLEVEL: NO PAIN (0)

## 2023-01-10 NOTE — PROGRESS NOTES
Otolaryngology Note         Chief Complaint:     Patient presents with:  Follow Up: 3 month recheck of ears and cough           History of Present Illness:     Scott Akhtar is a 72 year old male who presents today for ear cleaning.    Hearing aids have been helping.  He denies any acute changes in hearing.      No bothersome tinnitus  Occasional headrush with standing. Lasts seconds.   No rotary vertigo, facial numbness or weakness.      No otalgia, otorrhea.    No hx of COM or otologic surgeries.     He reports today that his cough has improved significantly.  He is unsure what changed.    Ultrasound thyroid completed 10/27/2022:  FINDINGS: The right lobe of the thyroid measures 4.4 x 2.2 x 1.6 cm.  Left lobe thyroid measures 2.9 x 1.2 x 1.2 cm.     In the right lobe of the thyroid there is a 1.3 x 1.2 x 1.6 cm in  diameter hypoechoic mass with internal echoes. This a small cyst seen  measuring 3 mm in diameter in the right lobe of the thyroid. In the  left lobe of the thyroid there is a hypoechoic mass measuring 1.2 x 1  x 1.1 cm                                                                        IMPRESSION: Bilateral small thyroid nodules the largest is on the  right measuring 1.6 cm in maximal diameter     FNA bilateral thyroid nodules completed 11/10/2022:  Final Diagnosis  A. Thyroid, right lobe nodule, ultrasound-guided fine-needle aspiration:  -BENIGN.  -Benign-appearing follicular cells, many macrophages and abundant colloid, consistent with benign follicular nodule with  cystic/degenerative changes (includes adenomatoid, hyperplastic or colloid nodule).  B. Thyroid, left lobe nodule, ultrasound-guided fine-needle aspiration:  -BENIGN.  -Benign-appearing follicular cells and abundant colloid, consistent with benign follicular nodule (includes adenomatoid,  hyperplastic or colloid nodule).    He is scheduled for a follow-up thyroid ultrasound in April 2023         Medications:     Current Outpatient  Rx   Medication Sig Dispense Refill     allopurinol (ZYLOPRIM) 100 MG tablet Take 1 tablet (100 mg) by mouth 2 times daily 60 tablet 4     ASPIRIN EC PO Take 81 mg by mouth daily (Patient not taking: Reported on 1/10/2023)       cetirizine (ZYRTEC) 10 MG tablet Take 10 mg by mouth daily (Patient not taking: Reported on 1/10/2023)       Cholecalciferol (VITAMIN D3 PO) Take 5,000 Units by mouth daily 2000 to 5000 (Patient not taking: Reported on 1/10/2023)       hydrochlorothiazide (HYDRODIURIL) 25 MG tablet Take 1 tablet (25 mg) by mouth daily 90 tablet 1     metFORMIN (GLUCOPHAGE) 500 MG tablet Take 1 tablet (500 mg) by mouth 2 times daily (with meals) 180 tablet 0     Multiple Minerals (CALCIUM-MAGNESIUM-ZINC) TABS Take 1 tablet by mouth daily (Patient not taking: Reported on 1/10/2023)       Omega-3 Fatty Acids (OMEGA-3 FISH OIL PO) Take 1,200 mg by mouth daily  (Patient not taking: Reported on 1/10/2023)       ONETOUCH ULTRA test strip TEST ONCE A  strip 3     potassium chloride ER (KLOR-CON) 10 MEQ CR tablet Take 1 tablet (10 mEq) by mouth daily 10 tablet 0     pravastatin (PRAVACHOL) 10 MG tablet 1 tablet 2 times weekly. (Patient taking differently: 10 mg once a week Patient takes 1 tablet every Monday - 10 mg) 24 tablet 3     triamcinolone (KENALOG) 0.1 % external ointment Apply topically 2 times daily 30 g 0     ezetimibe (ZETIA) 10 MG tablet Take 1 tablet (10 mg) by mouth daily 90 tablet 0            Allergies:     Allergies: Pseudoephedrine hcl, Simvastatin, Sulfa drugs, Chinese ink, and Privet          Past Medical History:     Past Medical History:   Diagnosis Date     Aneurysm of thoracic aorta      Diabetes (H)      External thrombosed hemorrhoids      Hiatal hernia      Hypertension      Need for prophylactic vaccination and inoculation against unspecified single bacterial disease      Other chest pain             Past Surgical History:     Past Surgical History:   Procedure Laterality Date      "ABDOMINAL AORTIC ANEURYSM REPAIR       BACK SURGERY      laminectomy L5S1     COLONOSCOPY       COLONOSCOPY N/A 10/14/2016    Procedure: COLONOSCOPY;  Surgeon: Daljit Salazar MD;  Location: [facility]     FINGER GANGLION CYST EXCISION       L5 S1 Laminectomy  1991     ROTATOR CUFF REPAIR RT/LT  2006     SHOULDER SURGERY Right     replacement     TONSILLECTOMY  1971            Social History:     Social History     Tobacco Use     Smoking status: Former     Types: Cigarettes     Quit date: 1985     Years since quittin.0     Smokeless tobacco: Never   Substance Use Topics     Alcohol use: Yes     Alcohol/week: 0.0 standard drinks     Comment: 3 beers daily      Drug use: Never     Comment: medical marijuana            Review of Systems:     ROS: See HPI         Physical Exam:     /74 (Cuff Size: Adult Regular)   Pulse 67   Temp 97.3  F (36.3  C) (Tympanic)   Ht 1.753 m (5' 9\")   Wt 102.1 kg (225 lb)   SpO2 97%   BMI 33.23 kg/m      General - The patient is well nourished and well developed, and appears to have good nutritional status.  Alert and oriented to person and place, answers questions and cooperates with examination appropriately.   Head and Face - Normocephalic and atraumatic, with no gross asymmetry noted.  The facial nerve is intact, with strong symmetric movements.  Voice and Breathing - The patient was breathing comfortably without the use of accessory muscles. There was no wheezing, stridor. The patients voice was clear and strong, and had appropriate pitch and quality.  Ears - The ears were examined with binocular microscopy and with otoscope.  External ears normal. Right canal cerumen impacted.  Left canal cerumen impacted.  The right ear was cleaned with #7, #5 [place].   Right tympanic membrane is intact without effusion, retraction or mass. The left ear was cleaned with #7, #5 [place].  Left tympanic membrane is intact without effusion, retraction or " mass.  Eyes - Extraocular movements intact, sclera were not icteric or injected, conjunctiva were pink and moist.  Mouth - Examination of the oral cavity showed pink, healthy oral mucosa. Dentition in good condition. No lesions or ulcerations noted. The tongue was mobile and midline.   Throat - The walls of the oropharynx were smooth, pink, moist, symmetric, and had no lesions or ulcerations.  The tonsillar pillars and soft palate were symmetric. The uvula was midline on elevation.    Neck - Full range of motion on passive movement.  Palpation of the occipital, submental, submandibular, internal jugular chain, and supraclavicular nodes did not demonstrate any abnormal lymph nodes or masses.  Palpation of the thyroid - slightly enlarged thyroid with left sided thyroid nodule.  Nose - External contour is symmetric, no gross deflection or scars.  Nasal mucosa is pink and moist with no abnormal mucus.  The septum and turbinates were evaluated with nasal speculum, no polyps, masses, or purulence noted on examination.         Assessment and Plan:       ICD-10-CM    1. Bilateral impacted cerumen  H61.23       2. Thyroid nodule  E04.1 US Thyroid        Follow up in 4 months for repeat ear cleaning and ultrasound thyroid.      Shanice COCHRAN  [facility]

## 2023-01-10 NOTE — H&P (VIEW-ONLY)
[facility] - [place]  [address]  [place] [address]  Phone: [phone]  Primary Provider: Usha Gray  Pre-op Performing Provider: SPEEDY OLIVIA      PREOPERATIVE EVALUATION:  Today's date: 1/10/2023    Scott Akhtar is a 72 year old male who presents for a preoperative evaluation.    Surgical Information:  Surgery/Procedure: Excision of mass on back and left posterior arm  Surgery Location: [facility]  Surgeon: Bello Lentz MD  Surgery Date: 1/16/2023  Time of Surgery: 1150 am  Where patient plans to recover: At home with family  Fax number for surgical facility: Note does not need to be faxed, will be available electronically in Epic.    Type of Anesthesia Anticipated: Local with MAC    Assessment & Plan     The proposed surgical procedure is considered LOW risk.    Preoperative examination  - CBC with platelets and differential  - Basic metabolic panel  Back Skin Lesion  Controlled type 2 diabetes mellitus without complication, without long-term current use of insulin (H)  Essential hypertension  Class 1 obesity due to excess calories with body mass index (BMI) of 33.0 to 33.9 in adult, unspecified whether serious comorbidity present             Risks and Recommendations:  The patient has the following additional risks and recommendations for perioperative complications:   - No identified additional risk factors other than previously addressed    Medication Instructions:  Patient is to take all scheduled medications on the day of surgery    RECOMMENDATION:  APPROVAL GIVEN to proceed with proposed procedure, without further diagnostic evaluation.      Subjective     HPI related to upcoming procedure: preop excision inclusion cysts from back and posterior LUE x3    Preop Questions 1/4/2023   1. Have you ever had a heart attack or stroke? No   2. Have you ever had surgery on your heart or blood vessels, such as a stent placement, a coronary artery bypass, or surgery on an artery in  your head, neck, heart, or legs? No, Has stent placement in abdominal aorta   3. Do you have chest pain with activity? No  >4 METS   4. Do you have a history of  heart failure? No   5. Do you currently have a cold, bronchitis or symptoms of other infection? No   6. Do you have a cough, shortness of breath, or wheezing? No   7. Do you or anyone in your family have previous history of blood clots? No   8. Do you or does anyone in your family have a serious bleeding problem such as prolonged bleeding following surgeries or cuts? No   9. Have you ever had problems with anemia or been told to take iron pills? No   10. Have you had any abnormal blood loss such as black, tarry or bloody stools? No   11. Have you ever had a blood transfusion? No   12. Are you willing to have a blood transfusion if it is medically needed before, during, or after your surgery? Yes   13. Have you or any of your relatives ever had problems with anesthesia? No   14. Do you have sleep apnea, excessive snoring or daytime drowsiness? No   15. Do you have any artifical heart valves or other implanted medical devices like a pacemaker, defibrillator, or continuous glucose monitor? No   16. Do you have artificial joints? YES - Right shoulder replacement   17. Are you allergic to latex? No       Health Care Directive:  Patient does not have a Health Care Directive or Living Will: Patient states has Advance Directive and will bring in a copy to clinic. Patient stated should be one here on file.    Preoperative Review of :   reviewed - no record of controlled substances prescribed.      Status of Chronic Conditions:  THC/CBD occasionally for back pain.  HTN - controlled  HLP  DM2 - A1c 6.5 on 9/8/22.  Gout  Repaired AAA 2016  BMI 34  Sleep study July/Aug 2022 showed AHI 5.  Abnormal stress test 7/2022 consistent with previous infarct.    Echo 7/2022 normal EF.        Review of Systems   Constitutional: Negative for fever.   Respiratory: Negative for  shortness of breath and wheezing.    Cardiovascular: Negative for chest pain, palpitations and peripheral edema.   Neurological: Negative for light-headedness.         Patient Active Problem List    Diagnosis Date Noted     Other chest pain 10/24/2022     Priority: Medium     Abnormal cardiovascular stress test on 7/5/2022 10/24/2022     Priority: Medium     Cough, unspecified type 10/24/2022     Priority: Medium     Abdominal discomfort 10/24/2022     Priority: Medium     Constipation 10/24/2022     Priority: Medium     Impaired hydration 10/24/2022     Priority: Medium     Aneurysm of ascending aorta without rupture 10/24/2022     Priority: Medium     History of AAA (abdominal aortic aneurysm) repair 10/24/2022     Priority: Medium     Dehydration 10/24/2022     Priority: Medium     Hypertriglyceridemia 10/24/2022     Priority: Medium     Subacute cutaneous lupus erythematosus-rheum did not feel he had systemic lupud 11/30/2021     Priority: Medium     Presence of right artificial shoulder joint 06/15/2021     Priority: Medium     Adverse effect of antihyperlipidemic drug, sequela 12/30/2020     Priority: Medium     Hepatic steatosis 08/17/2020     Priority: Medium     Hyperlipidemia associated with type 2 diabetes mellitus (H) 08/03/2020     Priority: Medium     PAD (peripheral artery disease) (H) 01/31/2020     Priority: Medium     Disorder of joint prosthesis (H) 02/28/2019     Priority: Medium     Overview:   Added automatically from request for surgery [id number]       Lumbar radiculopathy 10/06/2017     Priority: Medium     Muscle weakness 10/06/2017     Priority: Medium     History of repair of aneurysm of abdominal aorta using endovascular stent graft 01/26/2017     Priority: Medium     Presence of other vascular implants and grafts 01/26/2017     Priority: Medium     Gout 08/04/2016     Priority: Medium     Obesity with body mass index 30 or greater 07/28/2016     Priority: Medium     Controlled type 2  "diabetes mellitus without complication, without long-term current use of insulin (H) 07/27/2016     Priority: Medium     4/15/13:  A1c 6.5  10/13/2020- Diabetes without diabetic retinopathy of both eyes.  Overview:   Overview:   4/15/13:  A1c 6.5       Dermatitis herpetiformis 07/27/2016     Priority: Medium     Overview:   Diagnosed while in the .  Started after visiting [facility] in 1971.  Treats with dapsone four times a week; when he cuts down on the dose he gets a breakout of little water blisters on the backs of his hands, elbows, and knees.  Overview:   Overview:   Diagnosed while in the .  Started after visiting [facility] in 1971.  Treats with dapsone four times a week; when he cuts down on the dose he gets a breakout of little water blisters on the backs of his hands, elbows, and knees.       Essential hypertension 07/27/2016     Priority: Medium     Degenerative scoliosis in adult patient 07/27/2016     Priority: Medium     Sensorineural hearing loss (SNHL) 06/26/2013     Priority: Medium     Diaphragmatic hernia without obstruction or gangrene 12/19/2012     Priority: Medium     Overview:   Overview:   12/19/12, \"small esophageal hiatal hernia\" noted on CT abdomen/pelvis.       Gastroesophageal reflux disease without esophagitis 01/13/2005     Priority: Medium      Past Medical History:   Diagnosis Date     Aneurysm of thoracic aorta      Diabetes (H)      External thrombosed hemorrhoids      Hiatal hernia      Hypertension      Need for prophylactic vaccination and inoculation against unspecified single bacterial disease      Other chest pain      Past Surgical History:   Procedure Laterality Date     ABDOMINAL AORTIC ANEURYSM REPAIR       BACK SURGERY      laminectomy L5S1     COLONOSCOPY       COLONOSCOPY N/A 10/14/2016    Procedure: COLONOSCOPY;  Surgeon: Daljit Salazar MD;  Location: [facility]     FINGER GANGLION CYST EXCISION       L5 S1 Laminectomy  01/01/1991     ROTATOR CUFF REPAIR " RT/LT  2006     SHOULDER SURGERY Right     replacement     TONSILLECTOMY  1971     Current Outpatient Medications   Medication Sig Dispense Refill     allopurinol (ZYLOPRIM) 100 MG tablet Take 1 tablet (100 mg) by mouth 2 times daily 60 tablet 4     hydrochlorothiazide (HYDRODIURIL) 25 MG tablet Take 1 tablet (25 mg) by mouth daily 90 tablet 1     metFORMIN (GLUCOPHAGE) 500 MG tablet Take 1 tablet (500 mg) by mouth 2 times daily (with meals) 180 tablet 0     ONETOUCH ULTRA test strip TEST ONCE A  strip 3     potassium chloride ER (KLOR-CON) 10 MEQ CR tablet Take 1 tablet (10 mEq) by mouth daily 10 tablet 0     pravastatin (PRAVACHOL) 10 MG tablet 1 tablet 2 times weekly. (Patient taking differently: 10 mg once a week Patient takes 1 tablet every Monday - 10 mg) 24 tablet 3     triamcinolone (KENALOG) 0.1 % external ointment Apply topically 2 times daily 30 g 0     ASPIRIN EC PO Take 81 mg by mouth daily (Patient not taking: Reported on 1/10/2023)       cetirizine (ZYRTEC) 10 MG tablet Take 10 mg by mouth daily (Patient not taking: Reported on 1/10/2023)       Cholecalciferol (VITAMIN D3 PO) Take 5,000 Units by mouth daily 2000 to 5000 (Patient not taking: Reported on 1/10/2023)       ezetimibe (ZETIA) 10 MG tablet Take 1 tablet (10 mg) by mouth daily 90 tablet 0     Multiple Minerals (CALCIUM-MAGNESIUM-ZINC) TABS Take 1 tablet by mouth daily (Patient not taking: Reported on 1/10/2023)       Omega-3 Fatty Acids (OMEGA-3 FISH OIL PO) Take 1,200 mg by mouth daily  (Patient not taking: Reported on 1/10/2023)         Allergies   Allergen Reactions     Pseudoephedrine Hcl      Sudafed      Simvastatin GI Disturbance     Sulfa Drugs Nausea     Chinese Ink Other (See Comments)     Privet Other (See Comments)     Any Ink at all causes him to sneeze uncontrollable.         Social History     Tobacco Use     Smoking status: Former     Types: Cigarettes     Quit date: 1985     Years since quittin.0      Smokeless tobacco: Never   Substance Use Topics     Alcohol use: Yes     Alcohol/week: 0.0 standard drinks     Comment: 3 beers daily        History   Drug Use Unknown     Comment: medical marijuana         Objective     /68   Pulse 66   Temp (!) 96.7  F (35.9  C) (Tympanic)   Resp 18   Wt 104.2 kg (229 lb 11.2 oz)   SpO2 95%   BMI 33.92 kg/m      Physical Exam  Constitutional:       General: He is not in acute distress.  HENT:      Head: Normocephalic and atraumatic.      Right Ear: Tympanic membrane normal.      Left Ear: Tympanic membrane normal.      Mouth/Throat:      Mouth: Mucous membranes are moist.      Pharynx: Oropharynx is clear.   Eyes:      Conjunctiva/sclera: Conjunctivae normal.      Pupils: Pupils are equal, round, and reactive to light.   Neck:      Vascular: No carotid bruit.   Cardiovascular:      Rate and Rhythm: Normal rate and regular rhythm.      Heart sounds: Normal heart sounds. No murmur heard.  Pulmonary:      Effort: Pulmonary effort is normal.      Breath sounds: Normal breath sounds. No wheezing, rhonchi or rales.   Musculoskeletal:      Cervical back: Normal range of motion.      Right lower leg: No edema.      Left lower leg: No edema.   Lymphadenopathy:      Cervical: No cervical adenopathy.   Skin:     Comments: Posterior prox LUE, Upper back, lower back have skin lumps - suspected cysts   Neurological:      Mental Status: He is alert and oriented to person, place, and time.           Recent Labs   Lab Test 09/08/22  0853 06/07/22  1524   HGB  --  14.9   PLT  --  180    137   POTASSIUM 3.7 3.6   CR 0.87 0.88   A1C 6.5* 6.5*        Diagnostics:  Recent Results (from the past 168 hour(s))   Basic metabolic panel    Collection Time: 01/10/23  3:06 PM   Result Value Ref Range    Sodium 141 136 - 145 mmol/L    Potassium 3.9 3.4 - 5.3 mmol/L    Chloride 101 98 - 107 mmol/L    Carbon Dioxide (CO2) 28 22 - 29 mmol/L    Anion Gap 12 7 - 15 mmol/L    Urea Nitrogen 14.1  8.0 - 23.0 mg/dL    Creatinine 0.92 0.67 - 1.17 mg/dL    Calcium 9.7 8.8 - 10.2 mg/dL    Glucose 82 70 - 99 mg/dL    GFR Estimate 89 >60 mL/min/1.73m2   CBC with platelets and differential    Collection Time: 01/10/23  3:06 PM   Result Value Ref Range    WBC Count 6.6 4.0 - 11.0 10e3/uL    RBC Count 5.30 4.40 - 5.90 10e6/uL    Hemoglobin 15.4 13.3 - 17.7 g/dL    Hematocrit 47.4 40.0 - 53.0 %    MCV 89 78 - 100 fL    MCH 29.1 26.5 - 33.0 pg    MCHC 32.5 31.5 - 36.5 g/dL    RDW 14.9 10.0 - 15.0 %    Platelet Count 191 150 - 450 10e3/uL    % Neutrophils 65 %    % Lymphocytes 22 %    % Monocytes 10 %    % Eosinophils 3 %    % Basophils 0 %    % Immature Granulocytes 0 %    NRBCs per 100 WBC 0 <1 /100    Absolute Neutrophils 4.3 1.6 - 8.3 10e3/uL    Absolute Lymphocytes 1.4 0.8 - 5.3 10e3/uL    Absolute Monocytes 0.6 0.0 - 1.3 10e3/uL    Absolute Eosinophils 0.2 0.0 - 0.7 10e3/uL    Absolute Basophils 0.0 0.0 - 0.2 10e3/uL    Absolute Immature Granulocytes 0.0 <=0.4 10e3/uL    Absolute NRBCs 0.0 10e3/uL      No EKG required for low risk surgery (cataract, skin procedure, breast biopsy, etc).           Signed Electronically by: SPEEDY OLIVIA DO  Copy of this evaluation report is provided to requesting physician.

## 2023-01-10 NOTE — LETTER
1/10/2023         RE: Scott Akhtar  [address] [facility] [address]        Dear Colleague,    Thank you for referring your patient, Scott Akhtar, to the [facility]. Please see a copy of my visit note below.    Otolaryngology Note         Chief Complaint:     Patient presents with:  Follow Up: 3 month recheck of ears and cough           History of Present Illness:     Scott Akhtar is a 72 year old male who presents today for ear cleaning.    Hearing aids have been helping.  He denies any acute changes in hearing.      No bothersome tinnitus  Occasional headrush with standing. Lasts seconds.   No rotary vertigo, facial numbness or weakness.      No otalgia, otorrhea.    No hx of COM or otologic surgeries.     He reports today that his cough has improved significantly.  He is unsure what changed.    Ultrasound thyroid completed 10/27/2022:  FINDINGS: The right lobe of the thyroid measures 4.4 x 2.2 x 1.6 cm.  Left lobe thyroid measures 2.9 x 1.2 x 1.2 cm.     In the right lobe of the thyroid there is a 1.3 x 1.2 x 1.6 cm in  diameter hypoechoic mass with internal echoes. This a small cyst seen  measuring 3 mm in diameter in the right lobe of the thyroid. In the  left lobe of the thyroid there is a hypoechoic mass measuring 1.2 x 1  x 1.1 cm                                                                        IMPRESSION: Bilateral small thyroid nodules the largest is on the  right measuring 1.6 cm in maximal diameter     FNA bilateral thyroid nodules completed 11/10/2022:  Final Diagnosis  A. Thyroid, right lobe nodule, ultrasound-guided fine-needle aspiration:  -BENIGN.  -Benign-appearing follicular cells, many macrophages and abundant colloid, consistent with benign follicular nodule with  cystic/degenerative changes (includes adenomatoid, hyperplastic or colloid nodule).  B. Thyroid, left lobe nodule, ultrasound-guided fine-needle  aspiration:  -BENIGN.  -Benign-appearing follicular cells and abundant colloid, consistent with benign follicular nodule (includes adenomatoid,  hyperplastic or colloid nodule).    He is scheduled for a follow-up thyroid ultrasound in April 2023         Medications:     Current Outpatient Rx   Medication Sig Dispense Refill     allopurinol (ZYLOPRIM) 100 MG tablet Take 1 tablet (100 mg) by mouth 2 times daily 60 tablet 4     ASPIRIN EC PO Take 81 mg by mouth daily (Patient not taking: Reported on 1/10/2023)       cetirizine (ZYRTEC) 10 MG tablet Take 10 mg by mouth daily (Patient not taking: Reported on 1/10/2023)       Cholecalciferol (VITAMIN D3 PO) Take 5,000 Units by mouth daily 2000 to 5000 (Patient not taking: Reported on 1/10/2023)       hydrochlorothiazide (HYDRODIURIL) 25 MG tablet Take 1 tablet (25 mg) by mouth daily 90 tablet 1     metFORMIN (GLUCOPHAGE) 500 MG tablet Take 1 tablet (500 mg) by mouth 2 times daily (with meals) 180 tablet 0     Multiple Minerals (CALCIUM-MAGNESIUM-ZINC) TABS Take 1 tablet by mouth daily (Patient not taking: Reported on 1/10/2023)       Omega-3 Fatty Acids (OMEGA-3 FISH OIL PO) Take 1,200 mg by mouth daily  (Patient not taking: Reported on 1/10/2023)       ONETOUCH ULTRA test strip TEST ONCE A  strip 3     potassium chloride ER (KLOR-CON) 10 MEQ CR tablet Take 1 tablet (10 mEq) by mouth daily 10 tablet 0     pravastatin (PRAVACHOL) 10 MG tablet 1 tablet 2 times weekly. (Patient taking differently: 10 mg once a week Patient takes 1 tablet every Monday - 10 mg) 24 tablet 3     triamcinolone (KENALOG) 0.1 % external ointment Apply topically 2 times daily 30 g 0     ezetimibe (ZETIA) 10 MG tablet Take 1 tablet (10 mg) by mouth daily 90 tablet 0            Allergies:     Allergies: Pseudoephedrine hcl, Simvastatin, Sulfa drugs, Chinese ink, and Privet          Past Medical History:     Past Medical History:   Diagnosis Date     Aneurysm of thoracic aorta      Diabetes (H)   "    External thrombosed hemorrhoids      Hiatal hernia      Hypertension      Need for prophylactic vaccination and inoculation against unspecified single bacterial disease      Other chest pain             Past Surgical History:     Past Surgical History:   Procedure Laterality Date     ABDOMINAL AORTIC ANEURYSM REPAIR       BACK SURGERY      laminectomy L5S1     COLONOSCOPY       COLONOSCOPY N/A 10/14/2016    Procedure: COLONOSCOPY;  Surgeon: Daljit Salazar MD;  Location: [facility] OR     FINGER GANGLION CYST EXCISION       L5 S1 Laminectomy  1991     ROTATOR CUFF REPAIR RT/LT  2006     SHOULDER SURGERY Right     replacement     TONSILLECTOMY  1971            Social History:     Social History     Tobacco Use     Smoking status: Former     Types: Cigarettes     Quit date: 1985     Years since quittin.0     Smokeless tobacco: Never   Substance Use Topics     Alcohol use: Yes     Alcohol/week: 0.0 standard drinks     Comment: 3 beers daily      Drug use: Never     Comment: medical marijuana            Review of Systems:     ROS: See HPI         Physical Exam:     /74 (Cuff Size: Adult Regular)   Pulse 67   Temp 97.3  F (36.3  C) (Tympanic)   Ht 1.753 m (5' 9\")   Wt 102.1 kg (225 lb)   SpO2 97%   BMI 33.23 kg/m      General - The patient is well nourished and well developed, and appears to have good nutritional status.  Alert and oriented to person and place, answers questions and cooperates with examination appropriately.   Head and Face - Normocephalic and atraumatic, with no gross asymmetry noted.  The facial nerve is intact, with strong symmetric movements.  Voice and Breathing - The patient was breathing comfortably without the use of accessory muscles. There was no wheezing, stridor. The patients voice was clear and strong, and had appropriate pitch and quality.  Ears - The ears were examined with binocular microscopy and with otoscope.  External ears normal. Right canal " cerumen impacted.  Left canal cerumen impacted.  The right ear was cleaned with #7, #5 [place]itt.   Right tympanic membrane is intact without effusion, retraction or mass. The left ear was cleaned with #7, #5 Merritt.  Left tympanic membrane is intact without effusion, retraction or mass.  Eyes - Extraocular movements intact, sclera were not icteric or injected, conjunctiva were pink and moist.  Mouth - Examination of the oral cavity showed pink, healthy oral mucosa. Dentition in good condition. No lesions or ulcerations noted. The tongue was mobile and midline.   Throat - The walls of the oropharynx were smooth, pink, moist, symmetric, and had no lesions or ulcerations.  The tonsillar pillars and soft palate were symmetric. The uvula was midline on elevation.    Neck - Full range of motion on passive movement.  Palpation of the occipital, submental, submandibular, internal jugular chain, and supraclavicular nodes did not demonstrate any abnormal lymph nodes or masses.  Palpation of the thyroid - slightly enlarged thyroid with left sided thyroid nodule.  Nose - External contour is symmetric, no gross deflection or scars.  Nasal mucosa is pink and moist with no abnormal mucus.  The septum and turbinates were evaluated with nasal speculum, no polyps, masses, or purulence noted on examination.         Assessment and Plan:       ICD-10-CM    1. Bilateral impacted cerumen  H61.23       2. Thyroid nodule  E04.1 US Thyroid        Follow up in 4 months for repeat ear cleaning and ultrasound thyroid.      Shanice COCHRAN  [facility]        Again, thank you for allowing me to participate in the care of your patient.        Sincerely,        Shanice Robledo NP

## 2023-01-10 NOTE — PROGRESS NOTES
[facility] [place]  [address]  [place] [address]  Phone: [phone]  Primary Provider: Usha Gray  Pre-op Performing Provider: SPEEDY OLIVIA      PREOPERATIVE EVALUATION:  Today's date: 1/10/2023    Scott Akhtar is a 72 year old male who presents for a preoperative evaluation.    Surgical Information:  Surgery/Procedure: Excision of mass on back and left posterior arm  Surgery Location: [facility]  Surgeon: Bello Lentz MD  Surgery Date: 1/16/2023  Time of Surgery: 1150 am  Where patient plans to recover: At home with family  Fax number for surgical facility: Note does not need to be faxed, will be available electronically in Epic.    Type of Anesthesia Anticipated: Local with MAC    Assessment & Plan     The proposed surgical procedure is considered LOW risk.    Preoperative examination  - CBC with platelets and differential  - Basic metabolic panel  Back Skin Lesion  Controlled type 2 diabetes mellitus without complication, without long-term current use of insulin (H)  Essential hypertension  Class 1 obesity due to excess calories with body mass index (BMI) of 33.0 to 33.9 in adult, unspecified whether serious comorbidity present             Risks and Recommendations:  The patient has the following additional risks and recommendations for perioperative complications:   - No identified additional risk factors other than previously addressed    Medication Instructions:  Patient is to take all scheduled medications on the day of surgery    RECOMMENDATION:  APPROVAL GIVEN to proceed with proposed procedure, without further diagnostic evaluation.      Subjective     HPI related to upcoming procedure: preop excision inclusion cysts from back and posterior LUE x3    Preop Questions 1/4/2023   1. Have you ever had a heart attack or stroke? No   2. Have you ever had surgery on your heart or blood vessels, such as a stent placement, a coronary artery bypass, or surgery on an artery in  your head, neck, heart, or legs? No, Has stent placement in abdominal aorta   3. Do you have chest pain with activity? No  >4 METS   4. Do you have a history of  heart failure? No   5. Do you currently have a cold, bronchitis or symptoms of other infection? No   6. Do you have a cough, shortness of breath, or wheezing? No   7. Do you or anyone in your family have previous history of blood clots? No   8. Do you or does anyone in your family have a serious bleeding problem such as prolonged bleeding following surgeries or cuts? No   9. Have you ever had problems with anemia or been told to take iron pills? No   10. Have you had any abnormal blood loss such as black, tarry or bloody stools? No   11. Have you ever had a blood transfusion? No   12. Are you willing to have a blood transfusion if it is medically needed before, during, or after your surgery? Yes   13. Have you or any of your relatives ever had problems with anesthesia? No   14. Do you have sleep apnea, excessive snoring or daytime drowsiness? No   15. Do you have any artifical heart valves or other implanted medical devices like a pacemaker, defibrillator, or continuous glucose monitor? No   16. Do you have artificial joints? YES - Right shoulder replacement   17. Are you allergic to latex? No       Health Care Directive:  Patient does not have a Health Care Directive or Living Will: Patient states has Advance Directive and will bring in a copy to clinic. Patient stated should be one here on file.    Preoperative Review of :   reviewed - no record of controlled substances prescribed.      Status of Chronic Conditions:  THC/CBD occasionally for back pain.  HTN - controlled  HLP  DM2 - A1c 6.5 on 9/8/22.  Gout  Repaired AAA 2016  BMI 34  Sleep study July/Aug 2022 showed AHI 5.  Abnormal stress test 7/2022 consistent with previous infarct.    Echo 7/2022 normal EF.        Review of Systems   Constitutional: Negative for fever.   Respiratory: Negative for  shortness of breath and wheezing.    Cardiovascular: Negative for chest pain, palpitations and peripheral edema.   Neurological: Negative for light-headedness.         Patient Active Problem List    Diagnosis Date Noted     Other chest pain 10/24/2022     Priority: Medium     Abnormal cardiovascular stress test on 7/5/2022 10/24/2022     Priority: Medium     Cough, unspecified type 10/24/2022     Priority: Medium     Abdominal discomfort 10/24/2022     Priority: Medium     Constipation 10/24/2022     Priority: Medium     Impaired hydration 10/24/2022     Priority: Medium     Aneurysm of ascending aorta without rupture 10/24/2022     Priority: Medium     History of AAA (abdominal aortic aneurysm) repair 10/24/2022     Priority: Medium     Dehydration 10/24/2022     Priority: Medium     Hypertriglyceridemia 10/24/2022     Priority: Medium     Subacute cutaneous lupus erythematosus-rheum did not feel he had systemic lupud 11/30/2021     Priority: Medium     Presence of right artificial shoulder joint 06/15/2021     Priority: Medium     Adverse effect of antihyperlipidemic drug, sequela 12/30/2020     Priority: Medium     Hepatic steatosis 08/17/2020     Priority: Medium     Hyperlipidemia associated with type 2 diabetes mellitus (H) 08/03/2020     Priority: Medium     PAD (peripheral artery disease) (H) 01/31/2020     Priority: Medium     Disorder of joint prosthesis (H) 02/28/2019     Priority: Medium     Overview:   Added automatically from request for surgery [id number]       Lumbar radiculopathy 10/06/2017     Priority: Medium     Muscle weakness 10/06/2017     Priority: Medium     History of repair of aneurysm of abdominal aorta using endovascular stent graft 01/26/2017     Priority: Medium     Presence of other vascular implants and grafts 01/26/2017     Priority: Medium     Gout 08/04/2016     Priority: Medium     Obesity with body mass index 30 or greater 07/28/2016     Priority: Medium     Controlled type 2  "diabetes mellitus without complication, without long-term current use of insulin (H) 07/27/2016     Priority: Medium     4/15/13:  A1c 6.5  10/13/2020- Diabetes without diabetic retinopathy of both eyes.  Overview:   Overview:   4/15/13:  A1c 6.5       Dermatitis herpetiformis 07/27/2016     Priority: Medium     Overview:   Diagnosed while in the .  Started after visiting [facility] in 1971.  Treats with dapsone four times a week; when he cuts down on the dose he gets a breakout of little water blisters on the backs of his hands, elbows, and knees.  Overview:   Overview:   Diagnosed while in the .  Started after visiting [facility] in 1971.  Treats with dapsone four times a week; when he cuts down on the dose he gets a breakout of little water blisters on the backs of his hands, elbows, and knees.       Essential hypertension 07/27/2016     Priority: Medium     Degenerative scoliosis in adult patient 07/27/2016     Priority: Medium     Sensorineural hearing loss (SNHL) 06/26/2013     Priority: Medium     Diaphragmatic hernia without obstruction or gangrene 12/19/2012     Priority: Medium     Overview:   Overview:   12/19/12, \"small esophageal hiatal hernia\" noted on CT abdomen/pelvis.       Gastroesophageal reflux disease without esophagitis 01/13/2005     Priority: Medium      Past Medical History:   Diagnosis Date     Aneurysm of thoracic aorta      Diabetes (H)      External thrombosed hemorrhoids      Hiatal hernia      Hypertension      Need for prophylactic vaccination and inoculation against unspecified single bacterial disease      Other chest pain      Past Surgical History:   Procedure Laterality Date     ABDOMINAL AORTIC ANEURYSM REPAIR       BACK SURGERY      laminectomy L5S1     COLONOSCOPY       COLONOSCOPY N/A 10/14/2016    Procedure: COLONOSCOPY;  Surgeon: Daljit Salazar MD;  Location: [facility]     FINGER GANGLION CYST EXCISION       L5 S1 Laminectomy  01/01/1991     ROTATOR CUFF REPAIR " RT/LT  2006     SHOULDER SURGERY Right     replacement     TONSILLECTOMY  1971     Current Outpatient Medications   Medication Sig Dispense Refill     allopurinol (ZYLOPRIM) 100 MG tablet Take 1 tablet (100 mg) by mouth 2 times daily 60 tablet 4     hydrochlorothiazide (HYDRODIURIL) 25 MG tablet Take 1 tablet (25 mg) by mouth daily 90 tablet 1     metFORMIN (GLUCOPHAGE) 500 MG tablet Take 1 tablet (500 mg) by mouth 2 times daily (with meals) 180 tablet 0     ONETOUCH ULTRA test strip TEST ONCE A  strip 3     potassium chloride ER (KLOR-CON) 10 MEQ CR tablet Take 1 tablet (10 mEq) by mouth daily 10 tablet 0     pravastatin (PRAVACHOL) 10 MG tablet 1 tablet 2 times weekly. (Patient taking differently: 10 mg once a week Patient takes 1 tablet every Monday - 10 mg) 24 tablet 3     triamcinolone (KENALOG) 0.1 % external ointment Apply topically 2 times daily 30 g 0     ASPIRIN EC PO Take 81 mg by mouth daily (Patient not taking: Reported on 1/10/2023)       cetirizine (ZYRTEC) 10 MG tablet Take 10 mg by mouth daily (Patient not taking: Reported on 1/10/2023)       Cholecalciferol (VITAMIN D3 PO) Take 5,000 Units by mouth daily 2000 to 5000 (Patient not taking: Reported on 1/10/2023)       ezetimibe (ZETIA) 10 MG tablet Take 1 tablet (10 mg) by mouth daily 90 tablet 0     Multiple Minerals (CALCIUM-MAGNESIUM-ZINC) TABS Take 1 tablet by mouth daily (Patient not taking: Reported on 1/10/2023)       Omega-3 Fatty Acids (OMEGA-3 FISH OIL PO) Take 1,200 mg by mouth daily  (Patient not taking: Reported on 1/10/2023)         Allergies   Allergen Reactions     Pseudoephedrine Hcl      Sudafed      Simvastatin GI Disturbance     Sulfa Drugs Nausea     Chinese Ink Other (See Comments)     Privet Other (See Comments)     Any Ink at all causes him to sneeze uncontrollable.         Social History     Tobacco Use     Smoking status: Former     Types: Cigarettes     Quit date: 1985     Years since quittin.0      Smokeless tobacco: Never   Substance Use Topics     Alcohol use: Yes     Alcohol/week: 0.0 standard drinks     Comment: 3 beers daily        History   Drug Use Unknown     Comment: medical marijuana         Objective     /68   Pulse 66   Temp (!) 96.7  F (35.9  C) (Tympanic)   Resp 18   Wt 104.2 kg (229 lb 11.2 oz)   SpO2 95%   BMI 33.92 kg/m      Physical Exam  Constitutional:       General: He is not in acute distress.  HENT:      Head: Normocephalic and atraumatic.      Right Ear: Tympanic membrane normal.      Left Ear: Tympanic membrane normal.      Mouth/Throat:      Mouth: Mucous membranes are moist.      Pharynx: Oropharynx is clear.   Eyes:      Conjunctiva/sclera: Conjunctivae normal.      Pupils: Pupils are equal, round, and reactive to light.   Neck:      Vascular: No carotid bruit.   Cardiovascular:      Rate and Rhythm: Normal rate and regular rhythm.      Heart sounds: Normal heart sounds. No murmur heard.  Pulmonary:      Effort: Pulmonary effort is normal.      Breath sounds: Normal breath sounds. No wheezing, rhonchi or rales.   Musculoskeletal:      Cervical back: Normal range of motion.      Right lower leg: No edema.      Left lower leg: No edema.   Lymphadenopathy:      Cervical: No cervical adenopathy.   Skin:     Comments: Posterior prox LUE, Upper back, lower back have skin lumps - suspected cysts   Neurological:      Mental Status: He is alert and oriented to person, place, and time.           Recent Labs   Lab Test 09/08/22  0853 06/07/22  1524   HGB  --  14.9   PLT  --  180    137   POTASSIUM 3.7 3.6   CR 0.87 0.88   A1C 6.5* 6.5*        Diagnostics:  Recent Results (from the past 168 hour(s))   Basic metabolic panel    Collection Time: 01/10/23  3:06 PM   Result Value Ref Range    Sodium 141 136 - 145 mmol/L    Potassium 3.9 3.4 - 5.3 mmol/L    Chloride 101 98 - 107 mmol/L    Carbon Dioxide (CO2) 28 22 - 29 mmol/L    Anion Gap 12 7 - 15 mmol/L    Urea Nitrogen 14.1  8.0 - 23.0 mg/dL    Creatinine 0.92 0.67 - 1.17 mg/dL    Calcium 9.7 8.8 - 10.2 mg/dL    Glucose 82 70 - 99 mg/dL    GFR Estimate 89 >60 mL/min/1.73m2   CBC with platelets and differential    Collection Time: 01/10/23  3:06 PM   Result Value Ref Range    WBC Count 6.6 4.0 - 11.0 10e3/uL    RBC Count 5.30 4.40 - 5.90 10e6/uL    Hemoglobin 15.4 13.3 - 17.7 g/dL    Hematocrit 47.4 40.0 - 53.0 %    MCV 89 78 - 100 fL    MCH 29.1 26.5 - 33.0 pg    MCHC 32.5 31.5 - 36.5 g/dL    RDW 14.9 10.0 - 15.0 %    Platelet Count 191 150 - 450 10e3/uL    % Neutrophils 65 %    % Lymphocytes 22 %    % Monocytes 10 %    % Eosinophils 3 %    % Basophils 0 %    % Immature Granulocytes 0 %    NRBCs per 100 WBC 0 <1 /100    Absolute Neutrophils 4.3 1.6 - 8.3 10e3/uL    Absolute Lymphocytes 1.4 0.8 - 5.3 10e3/uL    Absolute Monocytes 0.6 0.0 - 1.3 10e3/uL    Absolute Eosinophils 0.2 0.0 - 0.7 10e3/uL    Absolute Basophils 0.0 0.0 - 0.2 10e3/uL    Absolute Immature Granulocytes 0.0 <=0.4 10e3/uL    Absolute NRBCs 0.0 10e3/uL      No EKG required for low risk surgery (cataract, skin procedure, breast biopsy, etc).           Signed Electronically by: SPEEDY OLIVIA DO  Copy of this evaluation report is provided to requesting physician.

## 2023-01-13 ASSESSMENT — ENCOUNTER SYMPTOMS
WHEEZING: 0
FEVER: 0
LIGHT-HEADEDNESS: 0
SHORTNESS OF BREATH: 0
PALPITATIONS: 0

## 2023-01-16 ENCOUNTER — ANESTHESIA (OUTPATIENT)
Dept: SURGERY | Facility: HOSPITAL | Age: 73
End: 2023-01-16
Payer: MEDICARE

## 2023-01-16 ENCOUNTER — HOSPITAL ENCOUNTER (OUTPATIENT)
Facility: HOSPITAL | Age: 73
Discharge: HOME | End: 2023-01-16
Attending: SURGERY | Admitting: SURGERY
Payer: MEDICARE

## 2023-01-16 VITALS
BODY MASS INDEX: 33.95 KG/M2 | HEIGHT: 69 IN | WEIGHT: 229.2 LBS | DIASTOLIC BLOOD PRESSURE: 75 MMHG | HEART RATE: 60 BPM | SYSTOLIC BLOOD PRESSURE: 129 MMHG | RESPIRATION RATE: 16 BRPM | OXYGEN SATURATION: 96 % | TEMPERATURE: 97.2 F

## 2023-01-16 DIAGNOSIS — L72.3 SEBACEOUS CYST: Primary | ICD-10-CM

## 2023-01-16 LAB
GLUCOSE BLDC GLUCOMTR-MCNC: 110 MG/DL (ref 70–99)
GLUCOSE BLDC GLUCOMTR-MCNC: 111 MG/DL (ref 70–99)

## 2023-01-16 PROCEDURE — 258N000003 HC RX IP 258 OP 636: Performed by: NURSE ANESTHETIST, CERTIFIED REGISTERED

## 2023-01-16 PROCEDURE — 250N000011 HC RX IP 250 OP 636: Performed by: NURSE ANESTHETIST, CERTIFIED REGISTERED

## 2023-01-16 PROCEDURE — 11401 EXC TR-EXT B9+MARG 0.6-1 CM: CPT | Performed by: NURSE ANESTHETIST, CERTIFIED REGISTERED

## 2023-01-16 PROCEDURE — 360N000075 HC SURGERY LEVEL 2, PER MIN: Performed by: SURGERY

## 2023-01-16 PROCEDURE — 11401 EXC TR-EXT B9+MARG 0.6-1 CM: CPT | Performed by: SURGERY

## 2023-01-16 PROCEDURE — 370N000017 HC ANESTHESIA TECHNICAL FEE, PER MIN: Performed by: SURGERY

## 2023-01-16 PROCEDURE — 82962 GLUCOSE BLOOD TEST: CPT

## 2023-01-16 PROCEDURE — 710N000012 HC RECOVERY PHASE 2, PER MINUTE: Performed by: SURGERY

## 2023-01-16 PROCEDURE — 999N000141 HC STATISTIC PRE-PROCEDURE NURSING ASSESSMENT: Performed by: SURGERY

## 2023-01-16 PROCEDURE — 250N000009 HC RX 250: Performed by: NURSE ANESTHETIST, CERTIFIED REGISTERED

## 2023-01-16 PROCEDURE — 250N000009 HC RX 250: Performed by: SURGERY

## 2023-01-16 PROCEDURE — 250N000025 HC SEVOFLURANE, PER MIN: Performed by: SURGERY

## 2023-01-16 PROCEDURE — 272N000001 HC OR GENERAL SUPPLY STERILE: Performed by: SURGERY

## 2023-01-16 PROCEDURE — 99100 ANES PT EXTEME AGE<1 YR&>70: CPT | Performed by: NURSE ANESTHETIST, CERTIFIED REGISTERED

## 2023-01-16 PROCEDURE — 250N000011 HC RX IP 250 OP 636: Performed by: SURGERY

## 2023-01-16 PROCEDURE — 88304 TISSUE EXAM BY PATHOLOGIST: CPT | Mod: TC | Performed by: SURGERY

## 2023-01-16 PROCEDURE — 710N000010 HC RECOVERY PHASE 1, LEVEL 2, PER MIN: Performed by: SURGERY

## 2023-01-16 RX ORDER — ONDANSETRON 2 MG/ML
INJECTION INTRAMUSCULAR; INTRAVENOUS PRN
Status: DISCONTINUED | OUTPATIENT
Start: 2023-01-16 | End: 2023-01-16

## 2023-01-16 RX ORDER — NALOXONE HYDROCHLORIDE 0.4 MG/ML
0.4 INJECTION, SOLUTION INTRAMUSCULAR; INTRAVENOUS; SUBCUTANEOUS
Status: DISCONTINUED | OUTPATIENT
Start: 2023-01-16 | End: 2023-01-16 | Stop reason: HOSPADM

## 2023-01-16 RX ORDER — SODIUM CHLORIDE, SODIUM LACTATE, POTASSIUM CHLORIDE, CALCIUM CHLORIDE 600; 310; 30; 20 MG/100ML; MG/100ML; MG/100ML; MG/100ML
INJECTION, SOLUTION INTRAVENOUS CONTINUOUS
Status: DISCONTINUED | OUTPATIENT
Start: 2023-01-16 | End: 2023-01-16 | Stop reason: HOSPADM

## 2023-01-16 RX ORDER — FENTANYL CITRATE 50 UG/ML
INJECTION, SOLUTION INTRAMUSCULAR; INTRAVENOUS PRN
Status: DISCONTINUED | OUTPATIENT
Start: 2023-01-16 | End: 2023-01-16

## 2023-01-16 RX ORDER — LABETALOL 20 MG/4 ML (5 MG/ML) INTRAVENOUS SYRINGE
PRN
Status: DISCONTINUED | OUTPATIENT
Start: 2023-01-16 | End: 2023-01-16

## 2023-01-16 RX ORDER — KETAMINE HYDROCHLORIDE 10 MG/ML
INJECTION INTRAMUSCULAR; INTRAVENOUS PRN
Status: DISCONTINUED | OUTPATIENT
Start: 2023-01-16 | End: 2023-01-16

## 2023-01-16 RX ORDER — BUPIVACAINE HYDROCHLORIDE 5 MG/ML
INJECTION, SOLUTION PERINEURAL PRN
Status: DISCONTINUED | OUTPATIENT
Start: 2023-01-16 | End: 2023-01-16 | Stop reason: HOSPADM

## 2023-01-16 RX ORDER — LIDOCAINE 40 MG/G
CREAM TOPICAL
Status: DISCONTINUED | OUTPATIENT
Start: 2023-01-16 | End: 2023-01-16 | Stop reason: HOSPADM

## 2023-01-16 RX ORDER — FENTANYL CITRATE 50 UG/ML
50 INJECTION, SOLUTION INTRAMUSCULAR; INTRAVENOUS
Status: DISCONTINUED | OUTPATIENT
Start: 2023-01-16 | End: 2023-01-16 | Stop reason: HOSPADM

## 2023-01-16 RX ORDER — BUPIVACAINE HYDROCHLORIDE 5 MG/ML
INJECTION, SOLUTION EPIDURAL; INTRACAUDAL
Status: DISCONTINUED
Start: 2023-01-16 | End: 2023-01-16 | Stop reason: HOSPADM

## 2023-01-16 RX ORDER — NALOXONE HYDROCHLORIDE 0.4 MG/ML
0.2 INJECTION, SOLUTION INTRAMUSCULAR; INTRAVENOUS; SUBCUTANEOUS
Status: DISCONTINUED | OUTPATIENT
Start: 2023-01-16 | End: 2023-01-16 | Stop reason: HOSPADM

## 2023-01-16 RX ORDER — GLYCOPYRROLATE 0.2 MG/ML
INJECTION, SOLUTION INTRAMUSCULAR; INTRAVENOUS PRN
Status: DISCONTINUED | OUTPATIENT
Start: 2023-01-16 | End: 2023-01-16

## 2023-01-16 RX ORDER — FENTANYL CITRATE 50 UG/ML
25 INJECTION, SOLUTION INTRAMUSCULAR; INTRAVENOUS EVERY 5 MIN PRN
Status: DISCONTINUED | OUTPATIENT
Start: 2023-01-16 | End: 2023-01-16 | Stop reason: HOSPADM

## 2023-01-16 RX ORDER — FENTANYL CITRATE 50 UG/ML
50 INJECTION, SOLUTION INTRAMUSCULAR; INTRAVENOUS EVERY 5 MIN PRN
Status: DISCONTINUED | OUTPATIENT
Start: 2023-01-16 | End: 2023-01-16 | Stop reason: HOSPADM

## 2023-01-16 RX ORDER — LABETALOL 20 MG/4 ML (5 MG/ML) INTRAVENOUS SYRINGE
10
Status: DISCONTINUED | OUTPATIENT
Start: 2023-01-16 | End: 2023-01-16 | Stop reason: HOSPADM

## 2023-01-16 RX ORDER — LIDOCAINE HYDROCHLORIDE 20 MG/ML
INJECTION, SOLUTION INFILTRATION; PERINEURAL PRN
Status: DISCONTINUED | OUTPATIENT
Start: 2023-01-16 | End: 2023-01-16

## 2023-01-16 RX ORDER — ONDANSETRON 2 MG/ML
4 INJECTION INTRAMUSCULAR; INTRAVENOUS EVERY 30 MIN PRN
Status: DISCONTINUED | OUTPATIENT
Start: 2023-01-16 | End: 2023-01-16 | Stop reason: HOSPADM

## 2023-01-16 RX ORDER — HYDRALAZINE HYDROCHLORIDE 20 MG/ML
2.5-5 INJECTION INTRAMUSCULAR; INTRAVENOUS EVERY 10 MIN PRN
Status: DISCONTINUED | OUTPATIENT
Start: 2023-01-16 | End: 2023-01-16 | Stop reason: HOSPADM

## 2023-01-16 RX ORDER — KETOROLAC TROMETHAMINE 30 MG/ML
INJECTION, SOLUTION INTRAMUSCULAR; INTRAVENOUS PRN
Status: DISCONTINUED | OUTPATIENT
Start: 2023-01-16 | End: 2023-01-16

## 2023-01-16 RX ORDER — PROPOFOL 10 MG/ML
INJECTION, EMULSION INTRAVENOUS PRN
Status: DISCONTINUED | OUTPATIENT
Start: 2023-01-16 | End: 2023-01-16

## 2023-01-16 RX ORDER — ONDANSETRON 4 MG/1
4 TABLET, ORALLY DISINTEGRATING ORAL EVERY 30 MIN PRN
Status: DISCONTINUED | OUTPATIENT
Start: 2023-01-16 | End: 2023-01-16 | Stop reason: HOSPADM

## 2023-01-16 RX ORDER — HYDROCODONE BITARTRATE AND ACETAMINOPHEN 5; 325 MG/1; MG/1
1 TABLET ORAL EVERY 6 HOURS PRN
Qty: 10 TABLET | Refills: 0 | Status: SHIPPED | OUTPATIENT
Start: 2023-01-16 | End: 2023-01-19

## 2023-01-16 RX ORDER — CEFAZOLIN SODIUM/WATER 2 G/20 ML
2 SYRINGE (ML) INTRAVENOUS
Status: COMPLETED | OUTPATIENT
Start: 2023-01-16 | End: 2023-01-16

## 2023-01-16 RX ORDER — CEFAZOLIN SODIUM/WATER 2 G/20 ML
2 SYRINGE (ML) INTRAVENOUS SEE ADMIN INSTRUCTIONS
Status: DISCONTINUED | OUTPATIENT
Start: 2023-01-16 | End: 2023-01-16 | Stop reason: HOSPADM

## 2023-01-16 RX ADMIN — LABETALOL 20 MG/4 ML (5 MG/ML) INTRAVENOUS SYRINGE 10 MG: at 11:31

## 2023-01-16 RX ADMIN — MIDAZOLAM 2 MG: 1 INJECTION INTRAMUSCULAR; INTRAVENOUS at 10:47

## 2023-01-16 RX ADMIN — ONDANSETRON 4 MG: 2 INJECTION INTRAMUSCULAR; INTRAVENOUS at 11:18

## 2023-01-16 RX ADMIN — Medication 20 MG: at 10:56

## 2023-01-16 RX ADMIN — Medication 100 MG: at 10:56

## 2023-01-16 RX ADMIN — FENTANYL CITRATE 100 MCG: 50 INJECTION, SOLUTION INTRAMUSCULAR; INTRAVENOUS at 10:53

## 2023-01-16 RX ADMIN — Medication 2 G: at 10:47

## 2023-01-16 RX ADMIN — LIDOCAINE HYDROCHLORIDE 40 MG: 20 INJECTION, SOLUTION INFILTRATION; PERINEURAL at 10:56

## 2023-01-16 RX ADMIN — KETOROLAC TROMETHAMINE 30 MG: 30 INJECTION, SOLUTION INTRAMUSCULAR at 11:18

## 2023-01-16 RX ADMIN — PROPOFOL 150 MG: 10 INJECTION, EMULSION INTRAVENOUS at 10:56

## 2023-01-16 RX ADMIN — SODIUM CHLORIDE, POTASSIUM CHLORIDE, SODIUM LACTATE AND CALCIUM CHLORIDE: 600; 310; 30; 20 INJECTION, SOLUTION INTRAVENOUS at 09:04

## 2023-01-16 RX ADMIN — GLYCOPYRROLATE 0.2 MG: 0.2 INJECTION, SOLUTION INTRAMUSCULAR; INTRAVENOUS at 11:05

## 2023-01-16 ASSESSMENT — ACTIVITIES OF DAILY LIVING (ADL)
ADLS_ACUITY_SCORE: 35

## 2023-01-16 NOTE — INTERVAL H&P NOTE
"I have reviewed the surgical (or preoperative) H&P that is linked to this encounter, and examined the patient. There are no significant changes    Clinical Conditions Present on Arrival:  Clinically Significant Risk Factors Present on Admission                    # Obesity: Estimated body mass index is 33.85 kg/m  as calculated from the following:    Height as of this encounter: 1.753 m (5' 9\").    Weight as of this encounter: 104 kg (229 lb 3.2 oz).       "

## 2023-01-16 NOTE — OR NURSING
Patient and responsible adult (Erma, wife) given discharge instructions with no questions regarding instructions. Lucie score 20/20. Pain level 0/10.  Discharged from unit via ambulation, declined wanting a wheelchair at this time, denied any nausea, lightheadedness, or feeling dizzy. Ambulated independantly. Patient discharged to home with wife, Erma. No further questions or concerns at time of discharge instructions or at time of discharge.

## 2023-01-16 NOTE — ANESTHESIA CARE TRANSFER NOTE
Patient: Scott Akhtar    Procedure: Procedure(s):  EXCISION OF MASSES ON BACK AND LEFT POSTERIOR ARM       Diagnosis: Infected cyst of skin [L72.9, L08.9]  Diagnosis Additional Information: No value filed.    Anesthesia Type:   General     Note:    Oropharynx: spontaneously breathing  Level of Consciousness: drowsy  Oxygen Supplementation: nasal cannula  Level of Supplemental Oxygen (L/min / FiO2): 2  Independent Airway: airway patency satisfactory and stable  Dentition: dentition unchanged  Vital Signs Stable: post-procedure vital signs reviewed and stable  Report to RN Given: handoff report given  Patient transferred to: PACU    Handoff Report: Identifed the Patient, Identified the Reponsible Provider, Reviewed the pertinent medical history, Discussed the surgical course, Reviewed Intra-OP anesthesia mangement and issues during anesthesia, Set expectations for post-procedure period and Allowed opportunity for questions and acknowledgement of understanding      Vitals:  Vitals Value Taken Time   /95 01/16/23 1138   Temp     Pulse 58 01/16/23 1139   Resp 17 01/16/23 1139   SpO2 95 % 01/16/23 1139   Vitals shown include unvalidated device data.    Electronically Signed By: LACHO Cardozo CRNA  January 16, 2023  11:40 AM

## 2023-01-16 NOTE — OP NOTE
REPORT OF OPERATION  DATE OF PROCEDURE: 1/16/2023    PATIENT: Scott Akhtar    SURGERY PERFORMED: Excision of mass masses on back x2 and left posterior arm x1.    PREOPERATIVE DIAGNOSIS: Multiple subcutaneous lesions on the back and left posterior upper arm.     POSTOPERATIVE DIAGNOSIS: Same    SURGEON: Bello Lentz MD    ASSISTANTS: Evelina Bauer CNP, Her assistance was required because of the technical aspects of the case    ANESTHESIA: General Endotracheal Anesthesia    COMPLICATIONS: None apparent    TRANSFUSIONS: None    TISSUE TO PATHOLOGY: Masses to pathology for pathological diagnosis.    FINDINGS: 1 x 1 cm mass of the lower back.  1 x 1 cm mass of the upper back.  1 x 1 cm mass of the posterior left upper arm.  All of these were subcutaneous in nature and consistent with epidermal inclusion cysts.     INDICATIONS: This is a 72 year old male with several lesions on his back and left upper arm. The patient will be taken to the operating room for excisional biopsy.    DESCRIPTIONS OF PROCEDURE IN DETAIL: After consent was obtained the patient was taken to the operative suite and sera in the supine position.  The patient was identified and the correct patient was confirmed.  General Endotracheal Anesthesia was administered by anesthesia.  The patient was then placed in the prone position and all pressure points were checked.  The patient was sterilely prepped and draped in the usual fashion.  A time out was performed verifying the correct patient and the correct procedure.  The entire operative team was in agreement.  All necessary equipment and supplies were in the room.    All lesions were addressed in the same manner.  The lesions were identified and then the skin was sharply entered and the dissection was carried down until isolation of the lesion.  The lesion was dissected away from the surrounding tissues and removed in it entirety.  The lesion was sent to pathology for pathological  diagnosises.  Hemostatis was assured.  The incision was closed with either stainless steel staples or 2-0 and 3-0 nylon sutures.  Sterile dressings were applied.  All needle, sponge and instrument counts were correct x 2. The patient was awakened wand takened to the recovery room in stable condition tolerating th procedure well.

## 2023-01-16 NOTE — ANESTHESIA POSTPROCEDURE EVALUATION
Patient: Scott Akhtar    Procedure: Procedure(s):  EXCISION OF MASSES ON BACK AND LEFT POSTERIOR ARM       Anesthesia Type:  General    Note:  Disposition: Outpatient   Postop Pain Control: Uneventful            Sign Out: Well controlled pain   PONV: No   Neuro/Psych: Uneventful            Sign Out: Acceptable/Baseline neuro status   Airway/Respiratory: Uneventful            Sign Out: Acceptable/Baseline resp. status   CV/Hemodynamics: Uneventful            Sign Out: Acceptable CV status; No obvious hypovolemia; No obvious fluid overload   Other NRE: NONE   DID A NON-ROUTINE EVENT OCCUR? No           Last vitals:  Vitals Value Taken Time   /86 01/16/23 1215   Temp 98.3  F (36.8  C) 01/16/23 1145   Pulse 57 01/16/23 1219   Resp 9 01/16/23 1219   SpO2 98 % 01/16/23 1221   Vitals shown include unvalidated device data.    Electronically Signed By: LACHO Pabon CRNA  January 16, 2023  1:03 PM

## 2023-01-17 LAB
PATH REPORT.COMMENTS IMP SPEC: NORMAL
PATH REPORT.FINAL DX SPEC: NORMAL
PATH REPORT.GROSS SPEC: NORMAL
PATH REPORT.MICROSCOPIC SPEC OTHER STN: NORMAL
PATH REPORT.RELEVANT HX SPEC: NORMAL
PHOTO IMAGE: NORMAL

## 2023-01-17 PROCEDURE — 88304 TISSUE EXAM BY PATHOLOGIST: CPT | Mod: 26 | Performed by: PATHOLOGY

## 2023-01-31 ENCOUNTER — OFFICE VISIT (OUTPATIENT)
Dept: SURGERY | Facility: OTHER | Age: 73
End: 2023-01-31
Attending: NURSE PRACTITIONER
Payer: MEDICARE

## 2023-01-31 VITALS
HEART RATE: 56 BPM | BODY MASS INDEX: 32.58 KG/M2 | OXYGEN SATURATION: 96 % | SYSTOLIC BLOOD PRESSURE: 122 MMHG | TEMPERATURE: 95.5 F | DIASTOLIC BLOOD PRESSURE: 78 MMHG | HEIGHT: 69 IN | WEIGHT: 220 LBS | RESPIRATION RATE: 15 BRPM

## 2023-01-31 DIAGNOSIS — Z98.890 STATUS POST EXCISIONAL BIOPSY: Primary | ICD-10-CM

## 2023-01-31 PROCEDURE — G0463 HOSPITAL OUTPT CLINIC VISIT: HCPCS

## 2023-01-31 PROCEDURE — 99212 OFFICE O/P EST SF 10 MIN: CPT | Performed by: NURSE PRACTITIONER

## 2023-01-31 ASSESSMENT — PAIN SCALES - GENERAL: PAINLEVEL: NO PAIN (0)

## 2023-01-31 NOTE — PATIENT INSTRUCTIONS
Thank you for allowing Evelina Bauer CNP and our surgical team to participate in your care. Please call our health unit coordinator at [phone] with scheduling questions or the nurse at [phone] with any other questions or concerns.

## 2023-01-31 NOTE — PROGRESS NOTES
CLINIC NOTE - POST-OP SURGERY  1/31/2023    Patient:Scott Akhtar    Procedure: Excision of mass masses on back x2 and left posterior arm x1.    This is a 72 year old male who is 2 weeks s/p Excision of mass masses on back x2 and left posterior arm x1..  The patient has no complaints today.     Current Medications:  Current Outpatient Medications   Medication Sig Dispense Refill     allopurinol (ZYLOPRIM) 100 MG tablet Take 1 tablet (100 mg) by mouth 2 times daily 60 tablet 4     ASPIRIN EC PO Take 81 mg by mouth daily       cetirizine (ZYRTEC) 10 MG tablet Take 10 mg by mouth daily       Cholecalciferol (VITAMIN D3 PO) Take 5,000 Units by mouth daily 2000 to 5000       hydrochlorothiazide (HYDRODIURIL) 25 MG tablet Take 1 tablet (25 mg) by mouth daily 90 tablet 1     metFORMIN (GLUCOPHAGE) 500 MG tablet Take 1 tablet (500 mg) by mouth 2 times daily (with meals) 180 tablet 0     Multiple Minerals (CALCIUM-MAGNESIUM-ZINC) TABS Take 1 tablet by mouth daily       Omega-3 Fatty Acids (OMEGA-3 FISH OIL PO) Take 1,200 mg by mouth daily       ONETOUCH ULTRA test strip TEST ONCE A  strip 3     potassium chloride ER (KLOR-CON) 10 MEQ CR tablet Take 1 tablet (10 mEq) by mouth daily 10 tablet 0     pravastatin (PRAVACHOL) 10 MG tablet 1 tablet 2 times weekly. (Patient taking differently: 10 mg once a week Patient takes 1 tablet every Monday - 10 mg) 24 tablet 3     triamcinolone (KENALOG) 0.1 % external ointment Apply topically 2 times daily 30 g 0     ezetimibe (ZETIA) 10 MG tablet Take 1 tablet (10 mg) by mouth daily 90 tablet 0       Allergies:  Allergies   Allergen Reactions     Pseudoephedrine Hcl      Sudafed      Simvastatin GI Disturbance     Sulfa Drugs Nausea     Chinese Ink Other (See Comments)     Privet Other (See Comments)     Any Ink at all causes him to sneeze uncontrollable.        PHYSICAL EXAM:   Vital signs: /78 (BP Location: Left arm, Cuff Size: Adult Large)   Pulse 56   Temp (!) 95.5  " F (35.3  C) (Tympanic)   Resp 15   Ht 1.753 m (5' 9\")   Wt 99.8 kg (220 lb)   SpO2 96%   BMI 32.49 kg/m     BMI: Body mass index is 32.49 kg/m .   General: Normal, healthy, cooperative, in no acute distress, alert   Lungs: respirations are non-labored   Abdominal: non-distended   Wounds:  Incisional sites are healing     PATHOLOGY:  Case Report   Date Value Ref Range Status   01/16/2023   Final    Surgical Pathology Report                         Case: [id number]                                  Authorizing Provider:  Bello Lentz MD  Collected:           01/16/2023 11:13 AM          Ordering Location:     [facility] Operating Room     Received:            01/16/2023 12:28 PM          Pathologist:           Julien Webster DO                                                         Specimens:   A) - Back, Lower, Lower back lesion                                                                 B) - Back, Upper, Upper back lesion                                                                 C) - Arm, Left, Upper posterior left arm lesion                                             Final Diagnosis   Date Value Ref Range Status   01/16/2023   Final    A.  Skin, lower back lesion, excision:  -Epidermoid cyst with evidence of previous rupture including surrounding dermal fibrosis, focal granulation tissue formation, acute    and chronic inflammation, and foreign body-type giant cell reaction with associated keratin debris.    B.  Skin, upper back lesion, excision:  -Epidermoid cyst.    C.  Skin, left upper posterior arm lesion, excision:  -Epidermoid cyst.          ASSESSMENT:    72 year old male who is 2 weeks s/p Excision of mass masses on back x2 and left posterior arm x1..  Doing well.     PLAN:   Some but not all staples were removed today.  No sutures were removed today.     Follow-up 1 weeks. Sooner with problems/concerns.     "

## 2023-02-07 ENCOUNTER — OFFICE VISIT (OUTPATIENT)
Dept: SURGERY | Facility: OTHER | Age: 73
End: 2023-02-07
Attending: NURSE PRACTITIONER
Payer: COMMERCIAL

## 2023-02-07 VITALS
HEART RATE: 55 BPM | DIASTOLIC BLOOD PRESSURE: 72 MMHG | BODY MASS INDEX: 32.58 KG/M2 | HEIGHT: 69 IN | TEMPERATURE: 97.1 F | WEIGHT: 220 LBS | OXYGEN SATURATION: 96 % | SYSTOLIC BLOOD PRESSURE: 120 MMHG

## 2023-02-07 DIAGNOSIS — Z98.890 STATUS POST EXCISIONAL BIOPSY: Primary | ICD-10-CM

## 2023-02-07 PROCEDURE — 99212 OFFICE O/P EST SF 10 MIN: CPT | Performed by: NURSE PRACTITIONER

## 2023-02-07 RX ORDER — EZETIMIBE 10 MG/1
10 TABLET ORAL DAILY
COMMUNITY
End: 2023-09-08

## 2023-02-07 ASSESSMENT — PAIN SCALES - GENERAL: PAINLEVEL: NO PAIN (0)

## 2023-02-07 NOTE — PROGRESS NOTES
CLINIC NOTE - POST-OP SURGERY  2/7/2023    Patient:Scott Akhtar    Procedure: Excision of mass masses on back x2 and left posterior arm x1.    This is a 72 year old male who is 3 weeks s/p Excision of mass masses on back x2 and left posterior arm x1..  The patient has no complaints today.     Current Medications:  Current Outpatient Medications   Medication Sig Dispense Refill     allopurinol (ZYLOPRIM) 100 MG tablet Take 1 tablet (100 mg) by mouth 2 times daily 60 tablet 4     ASPIRIN EC PO Take 81 mg by mouth daily       Cholecalciferol (VITAMIN D3 PO) Take 5,000 Units by mouth daily 2000 to 5000       ezetimibe (ZETIA) 10 MG tablet Take 10 mg by mouth daily       hydrochlorothiazide (HYDRODIURIL) 25 MG tablet Take 1 tablet (25 mg) by mouth daily 90 tablet 1     metFORMIN (GLUCOPHAGE) 500 MG tablet Take 1 tablet (500 mg) by mouth 2 times daily (with meals) 180 tablet 0     Multiple Minerals (CALCIUM-MAGNESIUM-ZINC) TABS Take 1 tablet by mouth daily       Omega-3 Fatty Acids (OMEGA-3 FISH OIL PO) Take 1,200 mg by mouth daily       ONETOUCH ULTRA test strip TEST ONCE A  strip 3     potassium chloride ER (KLOR-CON) 10 MEQ CR tablet Take 1 tablet (10 mEq) by mouth daily 10 tablet 0     pravastatin (PRAVACHOL) 10 MG tablet 1 tablet 2 times weekly. (Patient taking differently: 10 mg once a week Patient takes 1 tablet every Monday - 10 mg) 24 tablet 3     triamcinolone (KENALOG) 0.1 % external ointment Apply topically 2 times daily 30 g 0     cetirizine (ZYRTEC) 10 MG tablet Take 10 mg by mouth daily         Allergies:  Allergies   Allergen Reactions     Pseudoephedrine Hcl      Sudafed      Simvastatin GI Disturbance     Sulfa Drugs Nausea     Chinese Ink Other (See Comments)     Privet Other (See Comments)     Any Ink at all causes him to sneeze uncontrollable.        PHYSICAL EXAM:   Vital signs: /72 (BP Location: Left arm, Patient Position: Chair, Cuff Size: Adult Large)   Pulse 55   Temp 97.1  F  "(36.2  C) (Tympanic)   Ht 1.753 m (5' 9\")   Wt 99.8 kg (220 lb)   SpO2 96%   BMI 32.49 kg/m     BMI: Body mass index is 32.49 kg/m .   General: Normal, healthy, cooperative, in no acute distress, alert   Lungs: respirations are non-labored   Abdominal: non-distended   Wounds:  Incisional sites are healing     PATHOLOGY:  Case Report   Date Value Ref Range Status   01/16/2023   Final    Surgical Pathology Report                         Case: [id number]                                  Authorizing Provider:  Bello Lentz MD  Collected:           01/16/2023 11:13 AM          Ordering Location:     [facility] Operating [facility]     Received:            01/16/2023 12:28 PM          Pathologist:           Julien Webster DO                                                         Specimens:   A) - Back, Lower, Lower back lesion                                                                 B) - Back, Upper, Upper back lesion                                                                 C) - Arm, Left, Upper posterior left arm lesion                                             Final Diagnosis   Date Value Ref Range Status   01/16/2023   Final    A.  Skin, lower back lesion, excision:  -Epidermoid cyst with evidence of previous rupture including surrounding dermal fibrosis, focal granulation tissue formation, acute    and chronic inflammation, and foreign body-type giant cell reaction with associated keratin debris.    B.  Skin, upper back lesion, excision:  -Epidermoid cyst.    C.  Skin, left upper posterior arm lesion, excision:  -Epidermoid cyst.          ASSESSMENT:    72 year old male who is 3 weeks s/p Excision of mass masses on back x2 and left posterior arm x1..  Doing well.     PLAN:   Remaining staples and sutures were removed today without problems.    Follow-up as needed.   "

## 2023-02-07 NOTE — PATIENT INSTRUCTIONS
Thank you for allowing Evelina Bauer CNP and our surgical team to participate in your care. Please call our health unit coordinator at [phone] with scheduling questions or the nurse at [phone] with any other questions or concerns.

## 2023-02-14 ENCOUNTER — OFFICE VISIT (OUTPATIENT)
Dept: SURGERY | Facility: OTHER | Age: 73
End: 2023-02-14
Attending: NURSE PRACTITIONER
Payer: MEDICARE

## 2023-02-14 VITALS
HEART RATE: 60 BPM | BODY MASS INDEX: 32.58 KG/M2 | TEMPERATURE: 97.1 F | WEIGHT: 220 LBS | HEIGHT: 69 IN | OXYGEN SATURATION: 95 % | DIASTOLIC BLOOD PRESSURE: 70 MMHG | SYSTOLIC BLOOD PRESSURE: 122 MMHG

## 2023-02-14 DIAGNOSIS — Z98.890 STATUS POST EXCISIONAL BIOPSY: Primary | ICD-10-CM

## 2023-02-14 PROCEDURE — 99024 POSTOP FOLLOW-UP VISIT: CPT | Performed by: NURSE PRACTITIONER

## 2023-02-14 PROCEDURE — G0463 HOSPITAL OUTPT CLINIC VISIT: HCPCS

## 2023-02-14 ASSESSMENT — PAIN SCALES - GENERAL: PAINLEVEL: NO PAIN (0)

## 2023-02-14 NOTE — PATIENT INSTRUCTIONS
Thank you for allowing Evelina Bauer CNP and our surgical team to participate in your care. Please call our health unit coordinator at [phone] with scheduling questions or the nurse at [phone] with any other questions or concerns.

## 2023-02-14 NOTE — PROGRESS NOTES
CLINIC NOTE - POST-OP SURGERY  2/14/2023    Patient:Scott Akhtar    Procedure: Excision of mass masses on back x2 and left posterior arm x1.    This is a 72 year old male who is 4 weeks s/p Excision of mass masses on back x2 and left posterior arm x1..  The patient presents as the two back incisional sites have opened.  The patient is doing wet to dry dressings to the areas without problems.     Current Medications:  Current Outpatient Medications   Medication Sig Dispense Refill     allopurinol (ZYLOPRIM) 100 MG tablet Take 1 tablet (100 mg) by mouth 2 times daily 60 tablet 4     ASPIRIN EC PO Take 81 mg by mouth daily       cetirizine (ZYRTEC) 10 MG tablet Take 10 mg by mouth daily       Cholecalciferol (VITAMIN D3 PO) Take 5,000 Units by mouth daily 2000 to 5000       ezetimibe (ZETIA) 10 MG tablet Take 10 mg by mouth daily       hydrochlorothiazide (HYDRODIURIL) 25 MG tablet Take 1 tablet (25 mg) by mouth daily 90 tablet 1     metFORMIN (GLUCOPHAGE) 500 MG tablet Take 1 tablet (500 mg) by mouth 2 times daily (with meals) 180 tablet 0     Multiple Minerals (CALCIUM-MAGNESIUM-ZINC) TABS Take 1 tablet by mouth daily       Omega-3 Fatty Acids (OMEGA-3 FISH OIL PO) Take 1,200 mg by mouth daily       ONETOUCH ULTRA test strip TEST ONCE A  strip 3     potassium chloride ER (KLOR-CON) 10 MEQ CR tablet Take 1 tablet (10 mEq) by mouth daily 10 tablet 0     pravastatin (PRAVACHOL) 10 MG tablet 1 tablet 2 times weekly. (Patient taking differently: 10 mg once a week Patient takes 1 tablet every Monday - 10 mg) 24 tablet 3     triamcinolone (KENALOG) 0.1 % external ointment Apply topically 2 times daily 30 g 0       Allergies:  Allergies   Allergen Reactions     Pseudoephedrine Hcl      Sudafed      Simvastatin GI Disturbance     Sulfa Drugs Nausea     Chinese Ink Other (See Comments)     Privet Other (See Comments)     Any Ink at all causes him to sneeze uncontrollable.        PHYSICAL EXAM:   Vital signs: BP  "122/70 (BP Location: Right arm, Patient Position: Sitting, Cuff Size: Adult Large)   Pulse 60   Temp 97.1  F (36.2  C) (Tympanic)   Ht 1.753 m (5' 9\")   Wt 99.8 kg (220 lb)   SpO2 95%   BMI 32.49 kg/m     BMI: Body mass index is 32.49 kg/m .   General: Normal, healthy, cooperative, in no acute distress, alert   Lungs: respirations are non-labored   Abdominal: non-distended   Wounds:  Incisional sites are healing with granular wounds noted     PATHOLOGY:  Case Report   Date Value Ref Range Status   01/16/2023   Final    Surgical Pathology Report                         Case: [id number]                                  Authorizing Provider:  Bello Lentz MD  Collected:           01/16/2023 11:13 AM          Ordering Location:     [facility]     Received:            01/16/2023 12:28 PM          Pathologist:           Julien Webster DO                                                         Specimens:   A) - Back, Lower, Lower back lesion                                                                 B) - Back, Upper, Upper back lesion                                                                 C) - Arm, Left, Upper posterior left arm lesion                                             Final Diagnosis   Date Value Ref Range Status   01/16/2023   Final    A.  Skin, lower back lesion, excision:  -Epidermoid cyst with evidence of previous rupture including surrounding dermal fibrosis, focal granulation tissue formation, acute    and chronic inflammation, and foreign body-type giant cell reaction with associated keratin debris.    B.  Skin, upper back lesion, excision:  -Epidermoid cyst.    C.  Skin, left upper posterior arm lesion, excision:  -Epidermoid cyst.          ASSESSMENT:    72 year old male who is 4 weeks s/p Excision of mass masses on back x2 and left posterior arm x1..  Doing well.     PLAN:  Continue wet to dry dressing to the incisional sites.    Follow-up in 3 weeks for " recheck.  Sooner with problems or concerns.

## 2023-03-07 PROBLEM — R10.9 ABDOMINAL DISCOMFORT: Status: RESOLVED | Noted: 2022-10-24 | Resolved: 2023-03-07

## 2023-03-07 PROBLEM — R05.9 COUGH, UNSPECIFIED TYPE: Status: RESOLVED | Noted: 2022-10-24 | Resolved: 2023-03-07

## 2023-03-07 PROBLEM — E86.0 DEHYDRATION: Status: RESOLVED | Noted: 2022-10-24 | Resolved: 2023-03-07

## 2023-03-07 PROBLEM — E87.8 IMPAIRED HYDRATION: Status: RESOLVED | Noted: 2022-10-24 | Resolved: 2023-03-07

## 2023-03-07 NOTE — PROGRESS NOTES
Assessment & Plan     Controlled type 2 diabetes mellitus without complication, without long-term current use of insulin (H)  Hemoglobin A1c pending. Will notify patient of the results when available and intervene accordingly. On statin. BP at goal. On asa. Eye exam UTD. Will consider ARB in the future. Patient wants to think about this. Will then see me 6 months, sooner if his A1C is poorly controlled.     Essential hypertension  BP well controlled, however, he has a history of gout and is taking hydrochlorothiazide. No recent flares and he wants to continue the hydrochlorothiazide for now. He will, however, research losartan and if he wants to switch, will stop the hydrochlorothiazide and start losartan. Will then have to stop the potassium as well.     - Comprehensive metabolic panel (BMP + Alb, Alk Phos, ALT, AST, Total. Bili, TP); Future  - hydrochlorothiazide (HYDRODIURIL) 25 MG tablet; Take 1 tablet (25 mg) by mouth daily    Hyperlipidemia associated with type 2 diabetes mellitus (H)  Tolerating Zetia with pravastatin once weekly. Will start CoQ 10 and he will try taking the statin 2-3 times per week. May tolerate better if he starts CoQ 10.     - coenzyme Q-10 (CO-Q10) 50 MG capsule; Take 2 capsules (100 mg) by mouth daily    Idiopathic gout, unspecified chronicity, unspecified site  No recent flares. Will continue the allopurinol. I did mention that we may be able to get him off this if we stopped the hydrochlorothiazide. Declines to make medication changes at this time, but will consider in the future.     - Uric acid; Future    Hepatic steatosis  Will recheck his liver function today. Will notify patient of the results when available and intervene accordingly.     Alcohol Use  Drinking 2 shots 4-5 days per week with a beer. Encouraged to limit.     Aneurysm of ascending aorta without rupture  Managed by vascular. Will continue follow-up.     956}     BMI:   Estimated body mass index is 33.73 kg/m  as  "calculated from the following:    Height as of this encounter: 1.753 m (5' 9\").    Weight as of this encounter: 103.6 kg (228 lb 6.4 oz).   Weight management plan: Discussed healthy diet and exercise guidelines    No follow-ups on file.    Usha Gray NP  [facility] - MADELAINE Chavez is a 72 year old, presenting for the following health issues:  Diabetes, Lipids, and Hypertension      HPI     Diabetes Follow-up      How often are you checking your blood sugar? Not at all, once every few months    What concerns do you have today about your diabetes? None     Do you have any of these symptoms? (Select all that apply)  Blurry vision     A1C was 6.5 on 9/8/22. Taking Metformin 500 mg BID without side effects.     Denies chest pain, shortness of breath, dizziness, syncope, or palpitations.      GERD; currently taking omeprazole, symptoms controlled, no nausea or vomiting. No melena.     Hyperlipidemia Follow-Up      Are you regularly taking any medication or supplement to lower your cholesterol?   Yes- Takes Zetia daily with pravastatin once weekly.     Are you having muscle aches or other side effects that you think could be caused by your cholesterol lowering medication?  No     Denies chest pain, shortness of breath, dizziness, syncope, or palpitations.     Hypertension Follow-up      Do you check your blood pressure regularly outside of the clinic? No     Are you following a low salt diet? No    Are your blood pressures ever more than 140 on the top number (systolic) OR more   than 90 on the bottom number (diastolic), for example 140/90? No   Taking hydrochlorothiazide 25 mg without side effects. Also on potassium.     History of fatty liver disease. No recent LFTs. Will recheck today. No abdominal pain. No nausea or vomiting.     He does have a history of gout and takes allopuirinal. No flare in the past 2 years.     BP Readings from Last 2 Encounters:   03/08/23 128/74   03/08/23 " "128/74     Hemoglobin A1C (%)   Date Value   09/08/2022 6.5 (H)   06/07/2022 6.5 (H)     LDL Cholesterol Calculated (mg/dL)   Date Value   09/08/2022 63   06/07/2022 67       Review of Systems   Constitutional, HEENT, cardiovascular, pulmonary, gi and gu systems are negative, except as otherwise noted.      Objective    /74   Pulse 55   Temp 97.9  F (36.6  C) (Tympanic)   Ht 1.753 m (5' 9\")   Wt 103.6 kg (228 lb 6.4 oz)   SpO2 96%   BMI 33.73 kg/m    Body mass index is 33.73 kg/m .  Physical Exam       Labs pending                "

## 2023-03-08 ENCOUNTER — OFFICE VISIT (OUTPATIENT)
Dept: SURGERY | Facility: OTHER | Age: 73
End: 2023-03-08
Attending: NURSE PRACTITIONER
Payer: MEDICARE

## 2023-03-08 ENCOUNTER — OFFICE VISIT (OUTPATIENT)
Dept: FAMILY MEDICINE | Facility: OTHER | Age: 73
End: 2023-03-08
Attending: NURSE PRACTITIONER
Payer: COMMERCIAL

## 2023-03-08 VITALS
DIASTOLIC BLOOD PRESSURE: 74 MMHG | TEMPERATURE: 97.9 F | SYSTOLIC BLOOD PRESSURE: 128 MMHG | BODY MASS INDEX: 32.58 KG/M2 | HEIGHT: 69 IN | OXYGEN SATURATION: 96 % | WEIGHT: 220 LBS | HEART RATE: 55 BPM

## 2023-03-08 VITALS
SYSTOLIC BLOOD PRESSURE: 128 MMHG | HEIGHT: 69 IN | WEIGHT: 228.4 LBS | DIASTOLIC BLOOD PRESSURE: 74 MMHG | HEART RATE: 55 BPM | OXYGEN SATURATION: 96 % | TEMPERATURE: 97.9 F | BODY MASS INDEX: 33.83 KG/M2

## 2023-03-08 DIAGNOSIS — Z78.9 ALCOHOL USE: ICD-10-CM

## 2023-03-08 DIAGNOSIS — E11.9 CONTROLLED TYPE 2 DIABETES MELLITUS WITHOUT COMPLICATION, WITHOUT LONG-TERM CURRENT USE OF INSULIN (H): Primary | ICD-10-CM

## 2023-03-08 DIAGNOSIS — Z98.890 STATUS POST EXCISIONAL BIOPSY: Primary | ICD-10-CM

## 2023-03-08 DIAGNOSIS — K76.0 HEPATIC STEATOSIS: ICD-10-CM

## 2023-03-08 DIAGNOSIS — E78.5 HYPERLIPIDEMIA ASSOCIATED WITH TYPE 2 DIABETES MELLITUS (H): ICD-10-CM

## 2023-03-08 DIAGNOSIS — E11.69 HYPERLIPIDEMIA ASSOCIATED WITH TYPE 2 DIABETES MELLITUS (H): ICD-10-CM

## 2023-03-08 DIAGNOSIS — I10 ESSENTIAL HYPERTENSION: ICD-10-CM

## 2023-03-08 DIAGNOSIS — I71.21 ANEURYSM OF ASCENDING AORTA WITHOUT RUPTURE (H): ICD-10-CM

## 2023-03-08 DIAGNOSIS — M10.00 IDIOPATHIC GOUT, UNSPECIFIED CHRONICITY, UNSPECIFIED SITE: ICD-10-CM

## 2023-03-08 PROBLEM — F10.90 ALCOHOL USE: Status: ACTIVE | Noted: 2023-03-08

## 2023-03-08 LAB
ALBUMIN SERPL BCG-MCNC: 4.3 G/DL (ref 3.5–5.2)
ALP SERPL-CCNC: 64 U/L (ref 40–129)
ALT SERPL W P-5'-P-CCNC: 55 U/L (ref 10–50)
ANION GAP SERPL CALCULATED.3IONS-SCNC: 11 MMOL/L (ref 7–15)
AST SERPL W P-5'-P-CCNC: 49 U/L (ref 10–50)
BILIRUB SERPL-MCNC: 0.5 MG/DL
BUN SERPL-MCNC: 13.9 MG/DL (ref 8–23)
CALCIUM SERPL-MCNC: 9.9 MG/DL (ref 8.8–10.2)
CHLORIDE SERPL-SCNC: 97 MMOL/L (ref 98–107)
CREAT SERPL-MCNC: 0.93 MG/DL (ref 0.67–1.17)
DEPRECATED HCO3 PLAS-SCNC: 30 MMOL/L (ref 22–29)
EST. AVERAGE GLUCOSE BLD GHB EST-MCNC: 148 MG/DL
GFR SERPL CREATININE-BSD FRML MDRD: 88 ML/MIN/1.73M2
GLUCOSE SERPL-MCNC: 127 MG/DL (ref 70–99)
HBA1C MFR BLD: 6.8 %
POTASSIUM SERPL-SCNC: 4 MMOL/L (ref 3.4–5.3)
PROT SERPL-MCNC: 7.5 G/DL (ref 6.4–8.3)
SODIUM SERPL-SCNC: 138 MMOL/L (ref 136–145)
URATE SERPL-MCNC: 4.6 MG/DL (ref 3.4–7)

## 2023-03-08 PROCEDURE — G0463 HOSPITAL OUTPT CLINIC VISIT: HCPCS | Mod: 25

## 2023-03-08 PROCEDURE — 99214 OFFICE O/P EST MOD 30 MIN: CPT | Performed by: NURSE PRACTITIONER

## 2023-03-08 PROCEDURE — G0463 HOSPITAL OUTPT CLINIC VISIT: HCPCS

## 2023-03-08 PROCEDURE — 84550 ASSAY OF BLOOD/URIC ACID: CPT | Mod: ZL | Performed by: NURSE PRACTITIONER

## 2023-03-08 PROCEDURE — 83036 HEMOGLOBIN GLYCOSYLATED A1C: CPT | Mod: ZL | Performed by: NURSE PRACTITIONER

## 2023-03-08 PROCEDURE — 99212 OFFICE O/P EST SF 10 MIN: CPT | Performed by: NURSE PRACTITIONER

## 2023-03-08 PROCEDURE — 80053 COMPREHEN METABOLIC PANEL: CPT | Mod: ZL | Performed by: NURSE PRACTITIONER

## 2023-03-08 PROCEDURE — 36415 COLL VENOUS BLD VENIPUNCTURE: CPT | Mod: ZL | Performed by: NURSE PRACTITIONER

## 2023-03-08 RX ORDER — UBIDECARENONE 50 MG
100 CAPSULE ORAL DAILY
Qty: 180 CAPSULE | Refills: 0 | Status: SHIPPED | OUTPATIENT
Start: 2023-03-08

## 2023-03-08 RX ORDER — ALLOPURINOL 100 MG/1
100 TABLET ORAL 2 TIMES DAILY
Qty: 180 TABLET | Refills: 3 | Status: SHIPPED | OUTPATIENT
Start: 2023-03-08

## 2023-03-08 RX ORDER — HYDROCHLOROTHIAZIDE 25 MG/1
25 TABLET ORAL DAILY
Qty: 90 TABLET | Refills: 1 | Status: SHIPPED | OUTPATIENT
Start: 2023-03-08

## 2023-03-08 ASSESSMENT — PAIN SCALES - GENERAL
PAINLEVEL: NO PAIN (0)
PAINLEVEL: NO PAIN (0)

## 2023-03-08 NOTE — PROGRESS NOTES
CLINIC NOTE - POST-OP SURGERY  2/14/2023    Patient:Scott Akhtar    Procedure: Excision of mass masses on back x2 and left posterior arm x1.    This is a 72 year old male who is 7 weeks s/p Excision of mass masses on back x2 and left posterior arm x1..  The patient presents for wound care as the two back incisional sites have opened.  The patient is doing wet to dry dressings to the areas without problems.     Current Medications:  Current Outpatient Medications   Medication Sig Dispense Refill     allopurinol (ZYLOPRIM) 100 MG tablet Take 1 tablet (100 mg) by mouth 2 times daily 60 tablet 4     ASPIRIN EC PO Take 81 mg by mouth daily       cetirizine (ZYRTEC) 10 MG tablet Take 10 mg by mouth daily       Cholecalciferol (VITAMIN D3 PO) Take 5,000 Units by mouth daily 2000 to 5000       ezetimibe (ZETIA) 10 MG tablet Take 10 mg by mouth daily       hydrochlorothiazide (HYDRODIURIL) 25 MG tablet Take 1 tablet (25 mg) by mouth daily 90 tablet 1     metFORMIN (GLUCOPHAGE) 500 MG tablet Take 1 tablet (500 mg) by mouth 2 times daily (with meals) 180 tablet 0     Multiple Minerals (CALCIUM-MAGNESIUM-ZINC) TABS Take 1 tablet by mouth daily       Omega-3 Fatty Acids (OMEGA-3 FISH OIL PO) Take 1,200 mg by mouth daily       ONETOUCH ULTRA test strip TEST ONCE A  strip 3     potassium chloride ER (KLOR-CON) 10 MEQ CR tablet Take 1 tablet (10 mEq) by mouth daily 10 tablet 0     pravastatin (PRAVACHOL) 10 MG tablet 1 tablet 2 times weekly. (Patient taking differently: 10 mg once a week Patient takes 1 tablet every Monday - 10 mg) 24 tablet 3     triamcinolone (KENALOG) 0.1 % external ointment Apply topically 2 times daily 30 g 0       Allergies:  Allergies   Allergen Reactions     Pseudoephedrine Hcl      Sudafed      Simvastatin GI Disturbance     Sulfa Drugs Nausea     Chinese Ink Other (See Comments)     Privet Other (See Comments)     Any Ink at all causes him to sneeze uncontrollable.        PHYSICAL  "EXAM:   Vital signs: /74 (Cuff Size: Adult Regular)   Pulse 55   Temp 97.9  F (36.6  C) (Tympanic)   Ht 1.753 m (5' 9\")   Wt 99.8 kg (220 lb)   SpO2 96%   BMI 32.49 kg/m     BMI: Body mass index is 32.49 kg/m .   General: Normal, healthy, cooperative, in no acute distress, alert   Lungs: respirations are non-labored   Abdominal: non-distended   Wounds:  Incisional sites are healing with granular wounds noted     PATHOLOGY:  Case Report   Date Value Ref Range Status   01/16/2023   Final    Surgical Pathology Report                         Case: [id number]                                  Authorizing Provider:  Bello Lentz MD  Collected:           01/16/2023 11:13 AM          Ordering Location:     [facility]     Received:            01/16/2023 12:28 PM          Pathologist:           Julien Webster DO                                                         Specimens:   A) - Back, Lower, Lower back lesion                                                                 B) - Back, Upper, Upper back lesion                                                                 C) - Arm, Left, Upper posterior left arm lesion                                             Final Diagnosis   Date Value Ref Range Status   01/16/2023   Final    A.  Skin, lower back lesion, excision:  -Epidermoid cyst with evidence of previous rupture including surrounding dermal fibrosis, focal granulation tissue formation, acute    and chronic inflammation, and foreign body-type giant cell reaction with associated keratin debris.    B.  Skin, upper back lesion, excision:  -Epidermoid cyst.    C.  Skin, left upper posterior arm lesion, excision:  -Epidermoid cyst.          ASSESSMENT:    72 year old male who is 7 weeks s/p Excision of mass masses on back x2 and left posterior arm x1..  Doing well.     PLAN:  Treat wounds with mepilex border pads changed every 7 days or as needed due to soiling/saturation. "     Follow-up in 2 weeks for recheck.  Sooner with problems or concerns.

## 2023-03-08 NOTE — PATIENT INSTRUCTIONS
Thank you for allowing Evelina Bauer CNP and our surgical team to participate in your care. Please call our health unit coordinator at [phone] with scheduling questions or the nurse at [phone] with any other questions or concerns.

## 2023-03-09 ENCOUNTER — TELEPHONE (OUTPATIENT)
Dept: FAMILY MEDICINE | Facility: OTHER | Age: 73
End: 2023-03-09

## 2023-03-09 NOTE — TELEPHONE ENCOUNTER
Received PA from [employer] for Co Q-10 50MG capsule. Completed and faxed form to [employer]. Waiting for a response.

## 2023-03-10 NOTE — TELEPHONE ENCOUNTER
Received DENIAL from [employer] for Co Q-10 50MG capsules. 03/10/2023    Forms scanned to Epic.

## 2023-03-21 ENCOUNTER — OFFICE VISIT (OUTPATIENT)
Dept: SURGERY | Facility: OTHER | Age: 73
End: 2023-03-21
Attending: NURSE PRACTITIONER
Payer: MEDICARE

## 2023-03-21 VITALS
TEMPERATURE: 97.1 F | OXYGEN SATURATION: 95 % | BODY MASS INDEX: 34.56 KG/M2 | WEIGHT: 228 LBS | HEIGHT: 68 IN | HEART RATE: 62 BPM

## 2023-03-21 DIAGNOSIS — Z98.890 STATUS POST EXCISIONAL BIOPSY: Primary | ICD-10-CM

## 2023-03-21 PROCEDURE — G0463 HOSPITAL OUTPT CLINIC VISIT: HCPCS

## 2023-03-21 PROCEDURE — 99212 OFFICE O/P EST SF 10 MIN: CPT | Performed by: NURSE PRACTITIONER

## 2023-03-21 ASSESSMENT — PAIN SCALES - GENERAL: PAINLEVEL: NO PAIN (0)

## 2023-03-21 NOTE — PROGRESS NOTES
CLINIC NOTE - POST-OP SURGERY  3/21/2023    Patient:Scott Akhtar    Procedure: Excision of mass masses on back x2 and left posterior arm x1.    This is a 72 year old male who is 9 weeks s/p Excision of mass masses on back x2 and left posterior arm x1..  The patient presents for wound care as the two back incisional sites have opened.  The patient is using mepilex border pads for wound care without problems.    Current Medications:  Current Outpatient Medications   Medication Sig Dispense Refill     allopurinol (ZYLOPRIM) 100 MG tablet Take 1 tablet (100 mg) by mouth 2 times daily 180 tablet 3     ASPIRIN EC PO Take 81 mg by mouth daily       cetirizine (ZYRTEC) 10 MG tablet Take 10 mg by mouth daily       Cholecalciferol (VITAMIN D3 PO) Take 5,000 Units by mouth daily 2000 to 5000       coenzyme Q-10 (CO-Q10) 50 MG capsule Take 2 capsules (100 mg) by mouth daily 180 capsule 0     ezetimibe (ZETIA) 10 MG tablet Take 10 mg by mouth daily       hydrochlorothiazide (HYDRODIURIL) 25 MG tablet Take 1 tablet (25 mg) by mouth daily 90 tablet 1     metFORMIN (GLUCOPHAGE) 500 MG tablet Take 1 tablet (500 mg) by mouth 2 times daily (with meals) 180 tablet 3     Multiple Minerals (CALCIUM-MAGNESIUM-ZINC) TABS Take 1 tablet by mouth daily       Omega-3 Fatty Acids (OMEGA-3 FISH OIL PO) Take 1,200 mg by mouth daily       ONETOUCH ULTRA test strip TEST ONCE A  strip 3     potassium chloride ER (KLOR-CON) 10 MEQ CR tablet Take 1 tablet (10 mEq) by mouth daily 10 tablet 0     pravastatin (PRAVACHOL) 10 MG tablet 1 tablet 2 times weekly. (Patient taking differently: 10 mg once a week Patient takes 1 tablet every Monday - 10 mg) 24 tablet 3     triamcinolone (KENALOG) 0.1 % external ointment Apply topically 2 times daily 30 g 0       Allergies:  Allergies   Allergen Reactions     Pseudoephedrine Hcl      Sudafed      Simvastatin GI Disturbance     Sulfa Drugs Nausea     Chinese Ink Other (See Comments)     Privet Other  "(See Comments)     Any Ink at all causes him to sneeze uncontrollable.        PHYSICAL EXAM:   Vital signs: Pulse 62   Temp 97.1  F (36.2  C)   Ht 1.727 m (5' 8\")   Wt 103.4 kg (228 lb)   SpO2 95%   BMI 34.67 kg/m     BMI: Body mass index is 34.67 kg/m .   General: Normal, healthy, cooperative, in no acute distress, alert   Lungs: respirations are non-labored   Abdominal: non-distended   Wounds:  Incisional sites are healed with a scab     PATHOLOGY:  Case Report   Date Value Ref Range Status   01/16/2023   Final    Surgical Pathology Report                         Case: [id number]                                  Authorizing Provider:  Bello Lentz MD  Collected:           01/16/2023 11:13 AM          Ordering Location:     [facility]     Received:            01/16/2023 12:28 PM          Pathologist:           Julien Webster DO                                                         Specimens:   A) - Back, Lower, Lower back lesion                                                                 B) - Back, Upper, Upper back lesion                                                                 C) - Arm, Left, Upper posterior left arm lesion                                             Final Diagnosis   Date Value Ref Range Status   01/16/2023   Final    A.  Skin, lower back lesion, excision:  -Epidermoid cyst with evidence of previous rupture including surrounding dermal fibrosis, focal granulation tissue formation, acute    and chronic inflammation, and foreign body-type giant cell reaction with associated keratin debris.    B.  Skin, upper back lesion, excision:  -Epidermoid cyst.    C.  Skin, left upper posterior arm lesion, excision:  -Epidermoid cyst.          ASSESSMENT:    72 year old male who is 9 weeks s/p Excision of mass masses on back x2 and left posterior arm x1..  Doing well.     PLAN:  Follow up as needed at this time.  "

## 2023-04-09 ENCOUNTER — HEALTH MAINTENANCE LETTER (OUTPATIENT)
Age: 73
End: 2023-04-09

## 2023-04-10 ENCOUNTER — OFFICE VISIT (OUTPATIENT)
Dept: OTOLARYNGOLOGY | Facility: OTHER | Age: 73
End: 2023-04-10
Attending: NURSE PRACTITIONER
Payer: MEDICARE

## 2023-04-10 ENCOUNTER — HOSPITAL ENCOUNTER (OUTPATIENT)
Dept: ULTRASOUND IMAGING | Facility: HOSPITAL | Age: 73
Discharge: HOME | End: 2023-04-10
Attending: NURSE PRACTITIONER
Payer: MEDICARE

## 2023-04-10 VITALS
OXYGEN SATURATION: 96 % | HEIGHT: 69 IN | TEMPERATURE: 96.8 F | DIASTOLIC BLOOD PRESSURE: 80 MMHG | HEART RATE: 60 BPM | SYSTOLIC BLOOD PRESSURE: 120 MMHG | BODY MASS INDEX: 32.58 KG/M2 | WEIGHT: 220 LBS

## 2023-04-10 DIAGNOSIS — E04.1 THYROID NODULE: ICD-10-CM

## 2023-04-10 DIAGNOSIS — H61.892 IRRITATION OF LEFT EXTERNAL AUDITORY CANAL: Primary | ICD-10-CM

## 2023-04-10 DIAGNOSIS — H61.23 BILATERAL IMPACTED CERUMEN: ICD-10-CM

## 2023-04-10 PROCEDURE — 69210 REMOVE IMPACTED EAR WAX UNI: CPT | Performed by: NURSE PRACTITIONER

## 2023-04-10 PROCEDURE — 99213 OFFICE O/P EST LOW 20 MIN: CPT | Performed by: NURSE PRACTITIONER

## 2023-04-10 PROCEDURE — G0463 HOSPITAL OUTPT CLINIC VISIT: HCPCS

## 2023-04-10 PROCEDURE — 76536 US EXAM OF HEAD AND NECK: CPT

## 2023-04-10 RX ORDER — CIPROFLOXACIN AND DEXAMETHASONE 3; 1 MG/ML; MG/ML
4 SUSPENSION/ DROPS AURICULAR (OTIC) 2 TIMES DAILY
Qty: 2.8 ML | Refills: 0 | Status: SHIPPED | OUTPATIENT
Start: 2023-04-10 | End: 2023-04-17

## 2023-04-10 ASSESSMENT — PAIN SCALES - GENERAL: PAINLEVEL: NO PAIN (0)

## 2023-04-10 NOTE — LETTER
4/10/2023         RE: Scott Akhtar  [address] [facility] [address]        Dear Colleague,    Thank you for referring your patient, Scott Akhtar, to the [facility]. Please see a copy of my visit note below.    Otolaryngology Note         Chief Complaint:     Patient presents with:  Cerumen Impaction: Ear cleaning           History of Present Illness:     Scott Akhtar is a 72 year old male seen today for routine ear cleaning.    Left ear has been feeling itchy and a bit plugged.  No other concerns.  He denies any significant changes and hearing.  No concerns for cough.  Reports that his cough has been resolved since thyroid biopsy completed.    No otalgia, otorrhea, vertigo, flux hearing, changes in hearing         Medications:     Current Outpatient Rx   Medication Sig Dispense Refill     allopurinol (ZYLOPRIM) 100 MG tablet Take 1 tablet (100 mg) by mouth 2 times daily 180 tablet 3     ASPIRIN EC PO Take 81 mg by mouth daily       cetirizine (ZYRTEC) 10 MG tablet Take 10 mg by mouth daily       Cholecalciferol (VITAMIN D3 PO) Take 5,000 Units by mouth daily 2000 to 5000       ciprofloxacin-dexamethasone (CIPRODEX) 0.3-0.1 % otic suspension Place 4 drops Into the left ear 2 times daily for 7 days 2.8 mL 0     coenzyme Q-10 (CO-Q10) 50 MG capsule Take 2 capsules (100 mg) by mouth daily 180 capsule 0     ezetimibe (ZETIA) 10 MG tablet Take 10 mg by mouth daily       hydrochlorothiazide (HYDRODIURIL) 25 MG tablet Take 1 tablet (25 mg) by mouth daily 90 tablet 1     metFORMIN (GLUCOPHAGE) 500 MG tablet Take 1 tablet (500 mg) by mouth 2 times daily (with meals) 180 tablet 3     Multiple Minerals (CALCIUM-MAGNESIUM-ZINC) TABS Take 1 tablet by mouth daily       Omega-3 Fatty Acids (OMEGA-3 FISH OIL PO) Take 1,200 mg by mouth daily       ONETOUCH ULTRA test strip TEST ONCE A  strip 3     potassium chloride ER (KLOR-CON) 10 MEQ CR tablet Take 1 tablet (10 mEq) by  mouth daily 10 tablet 0     pravastatin (PRAVACHOL) 10 MG tablet 1 tablet 2 times weekly. (Patient taking differently: 10 mg once a week Patient takes 1 tablet every Monday - 10 mg) 24 tablet 3     triamcinolone (KENALOG) 0.1 % external ointment Apply topically 2 times daily 30 g 0            Allergies:     Allergies: Pseudoephedrine hcl, Simvastatin, Sulfa drugs, Chinese ink, and Privet          Past Medical History:     Past Medical History:   Diagnosis Date     Aneurysm of thoracic aorta (H)      Arthritis 2019     Diabetes (H)      External thrombosed hemorrhoids      Hiatal hernia      Hypertension      Need for prophylactic vaccination and inoculation against unspecified single bacterial disease      Other chest pain             Past Surgical History:     Past Surgical History:   Procedure Laterality Date     ABDOMINAL AORTIC ANEURYSM REPAIR       BACK SURGERY      laminectomy L5S1     BIOPSY      Dermatitis herpetiformis     COLONOSCOPY       COLONOSCOPY N/A 10/14/2016    Procedure: COLONOSCOPY;  Surgeon: Daljit Salazar MD;  Location: [facility]     EXCISE LESION BACK N/A 2023    Procedure: EXCISION OF MASSES ON BACK AND LEFT POSTERIOR ARM;  Surgeon: eBllo Lentz MD;  Location: [facility]     EYE SURGERY      Lasik     FINGER GANGLION CYST EXCISION       GENITOURINARY SURGERY      Vasectomy     L5 S1 Laminectomy  1991     ROTATOR CUFF REPAIR RT/LT  2006     SHOULDER SURGERY Right     replacement     TONSILLECTOMY  1971     VASCULAR SURGERY      Aortic aneurysm stent       ENT family history reviewed         Social History:     Social History     Tobacco Use     Smoking status: Former     Packs/day: 1.00     Years: 27.00     Pack years: 27.00     Types: Cigarettes     Start date: 1961     Quit date: 3/26/1988     Years since quittin.0     Smokeless tobacco: Never   Substance Use Topics     Alcohol use: Yes     Comment: 3 beers daily      Drug use: Never      "Comment: medical marijuana            Review of Systems:     ROS: See HPI         Physical Exam:     /80 (BP Location: Right arm, Cuff Size: Adult Large)   Pulse 60   Temp 96.8  F (36  C) (Tympanic)   Ht 1.753 m (5' 9\")   Wt 99.8 kg (220 lb)   SpO2 96%   BMI 32.49 kg/m    General - The patient is well nourished and well developed, and appears to have good nutritional status.  Alert and oriented to person and place, answers questions and cooperates with examination appropriately.   Head and Face - Normocephalic and atraumatic, with no gross asymmetry noted.  The facial nerve is intact, with strong symmetric movements.  Voice and Breathing - The patient was breathing comfortably without the use of accessory muscles. There was no wheezing, stridor. The patients voice was clear and strong, and had appropriate pitch and quality.  Ears - External ear normal.  The ears were examined under binocular microscopy and with otoscope.  Bilateral canals are cerumen impacted.  The right ear was cleaned with #7 suction and cupped forceps.  The right tympanic membrane is intact without effusion or retraction.  The left ear was cleaned with #7 suction, and cupped forceps.  There is an area of mild irritation and tenderness on the inferior posterior proximal canal.  He did not tolerate removal of the cerumen and irritated area.  The left tympanic membrane is intact without effusion or retraction.  Eyes - Extraocular movements intact, sclera were not icteric or injected.  Mouth - Examination of the oral cavity showed pink, healthy oral mucosa. Dentition in good condition. No lesions or ulcerations noted. The tongue was mobile and midline.   Throat - The walls of the oropharynx were smooth, pink, moist, symmetric, and had no lesions or ulcerations.  The tonsillar pillars and soft palate were symmetric. The uvula was midline on elevation.    Neck - Normal midline excursion of the laryngotracheal complex during swallowing.  " Full range of motion on passive movement.  Palpation of the occipital, submental, submandibular, internal jugular chain, and supraclavicular nodes did not demonstrate any abnormal lymph nodes or masses.  Palpation of the thyroid was soft and smooth, with no nodules or goiter appreciated.  The trachea was mobile and midline.  Nose - External contour is symmetric, no gross deflection or scars.  Nasal mucosa is pink and moist with no abnormal mucus.  The septum and turbinates were evaluated with nasal speculum, no polyps, masses, or purulence noted on examination.         Assessment and Plan:       ICD-10-CM    1. Irritation of left external auditory canal  H61.892 ciprofloxacin-dexamethasone (CIPRODEX) 0.3-0.1 % otic suspension      2. Bilateral impacted cerumen  H61.23           Follow up in 3 months for an ear cleaning    Use Ciprodex as prescribed to the left ear    Follow up if pain persists    If the area remains after 3 months and treatment with Ciprodex, biopsy may be indicated    Shanice COCHRAN  [facility] ENT        Again, thank you for allowing me to participate in the care of your patient.        Sincerely,        Shanice Robledo NP

## 2023-04-10 NOTE — PATIENT INSTRUCTIONS
Thank you for allowing Shanice Robledo NP and our ENT team to participate in your care.  If your medications are too expensive, please give the nurse a call.  We can possibly change this medication.  If you have a scheduling or an appointment question please contact our Health Unit Coordinator at their direct line [phone].      Follow up in 3 months for an ear cleaning    Use Ciprodex as prescribed to the left ear    Follow up if pain persists

## 2023-04-10 NOTE — PROGRESS NOTES
Otolaryngology Note         Chief Complaint:     Patient presents with:  Cerumen Impaction: Ear cleaning           History of Present Illness:     Scott Akhtar is a 72 year old male seen today for routine ear cleaning.    Left ear has been feeling itchy and a bit plugged.  No other concerns.  He denies any significant changes and hearing.  No concerns for cough.  Reports that his cough has been resolved since thyroid biopsy completed.    No otalgia, otorrhea, vertigo, flux hearing, changes in hearing         Medications:     Current Outpatient Rx   Medication Sig Dispense Refill     allopurinol (ZYLOPRIM) 100 MG tablet Take 1 tablet (100 mg) by mouth 2 times daily 180 tablet 3     ASPIRIN EC PO Take 81 mg by mouth daily       cetirizine (ZYRTEC) 10 MG tablet Take 10 mg by mouth daily       Cholecalciferol (VITAMIN D3 PO) Take 5,000 Units by mouth daily 2000 to 5000       ciprofloxacin-dexamethasone (CIPRODEX) 0.3-0.1 % otic suspension Place 4 drops Into the left ear 2 times daily for 7 days 2.8 mL 0     coenzyme Q-10 (CO-Q10) 50 MG capsule Take 2 capsules (100 mg) by mouth daily 180 capsule 0     ezetimibe (ZETIA) 10 MG tablet Take 10 mg by mouth daily       hydrochlorothiazide (HYDRODIURIL) 25 MG tablet Take 1 tablet (25 mg) by mouth daily 90 tablet 1     metFORMIN (GLUCOPHAGE) 500 MG tablet Take 1 tablet (500 mg) by mouth 2 times daily (with meals) 180 tablet 3     Multiple Minerals (CALCIUM-MAGNESIUM-ZINC) TABS Take 1 tablet by mouth daily       Omega-3 Fatty Acids (OMEGA-3 FISH OIL PO) Take 1,200 mg by mouth daily       ONETOUCH ULTRA test strip TEST ONCE A  strip 3     potassium chloride ER (KLOR-CON) 10 MEQ CR tablet Take 1 tablet (10 mEq) by mouth daily 10 tablet 0     pravastatin (PRAVACHOL) 10 MG tablet 1 tablet 2 times weekly. (Patient taking differently: 10 mg once a week Patient takes 1 tablet every Monday - 10 mg) 24 tablet 3     triamcinolone (KENALOG) 0.1 % external ointment Apply  "topically 2 times daily 30 g 0            Allergies:     Allergies: Pseudoephedrine hcl, Simvastatin, Sulfa drugs, Chinese ink, and Privet          Past Medical History:     Past Medical History:   Diagnosis Date     Aneurysm of thoracic aorta (H)      Arthritis 2019     Diabetes (H)      External thrombosed hemorrhoids      Hiatal hernia      Hypertension      Need for prophylactic vaccination and inoculation against unspecified single bacterial disease      Other chest pain             Past Surgical History:     Past Surgical History:   Procedure Laterality Date     ABDOMINAL AORTIC ANEURYSM REPAIR       BACK SURGERY      laminectomy L5S1     BIOPSY  1971    Dermatitis herpetiformis     COLONOSCOPY       COLONOSCOPY N/A 10/14/2016    Procedure: COLONOSCOPY;  Surgeon: Daljit Salazar MD;  Location: [facility]     EXCISE LESION BACK N/A 2023    Procedure: EXCISION OF MASSES ON BACK AND LEFT POSTERIOR ARM;  Surgeon: Bello Lentz MD;  Location: [facility]     EYE SURGERY      Lasik     FINGER GANGLION CYST EXCISION       GENITOURINARY SURGERY      Vasectomy     L5 S1 Laminectomy  1991     ROTATOR CUFF REPAIR RT/LT  2006     SHOULDER SURGERY Right     replacement     TONSILLECTOMY  1971     VASCULAR SURGERY      Aortic aneurysm stent       ENT family history reviewed         Social History:     Social History     Tobacco Use     Smoking status: Former     Packs/day: 1.00     Years: 27.00     Pack years: 27.00     Types: Cigarettes     Start date: 1961     Quit date: 3/26/1988     Years since quittin.0     Smokeless tobacco: Never   Substance Use Topics     Alcohol use: Yes     Comment: 3 beers daily      Drug use: Never     Comment: medical marijuana            Review of Systems:     ROS: See HPI         Physical Exam:     /80 (BP Location: Right arm, Cuff Size: Adult Large)   Pulse 60   Temp 96.8  F (36  C) (Tympanic)   Ht 1.753 m (5' 9\")   Wt 99.8 kg (220 lb)   " SpO2 96%   BMI 32.49 kg/m    General - The patient is well nourished and well developed, and appears to have good nutritional status.  Alert and oriented to person and place, answers questions and cooperates with examination appropriately.   Head and Face - Normocephalic and atraumatic, with no gross asymmetry noted.  The facial nerve is intact, with strong symmetric movements.  Voice and Breathing - The patient was breathing comfortably without the use of accessory muscles. There was no wheezing, stridor. The patients voice was clear and strong, and had appropriate pitch and quality.  Ears - External ear normal.  The ears were examined under binocular microscopy and with otoscope.  Bilateral canals are cerumen impacted.  The right ear was cleaned with #7 suction and cupped forceps.  The right tympanic membrane is intact without effusion or retraction.  The left ear was cleaned with #7 suction, and cupped forceps.  There is an area of mild irritation and tenderness on the inferior posterior proximal canal.  He did not tolerate removal of the cerumen and irritated area.  The left tympanic membrane is intact without effusion or retraction.  Eyes - Extraocular movements intact, sclera were not icteric or injected.  Mouth - Examination of the oral cavity showed pink, healthy oral mucosa. Dentition in good condition. No lesions or ulcerations noted. The tongue was mobile and midline.   Throat - The walls of the oropharynx were smooth, pink, moist, symmetric, and had no lesions or ulcerations.  The tonsillar pillars and soft palate were symmetric. The uvula was midline on elevation.    Neck - Normal midline excursion of the laryngotracheal complex during swallowing.  Full range of motion on passive movement.  Palpation of the occipital, submental, submandibular, internal jugular chain, and supraclavicular nodes did not demonstrate any abnormal lymph nodes or masses.  Palpation of the thyroid was soft and smooth, with no  nodules or goiter appreciated.  The trachea was mobile and midline.  Nose - External contour is symmetric, no gross deflection or scars.  Nasal mucosa is pink and moist with no abnormal mucus.  The septum and turbinates were evaluated with nasal speculum, no polyps, masses, or purulence noted on examination.         Assessment and Plan:       ICD-10-CM    1. Irritation of left external auditory canal  H61.892 ciprofloxacin-dexamethasone (CIPRODEX) 0.3-0.1 % otic suspension      2. Bilateral impacted cerumen  H61.23           Follow up in 3 months for an ear cleaning    Use Ciprodex as prescribed to the left ear    Follow up if pain persists    If the area remains after 3 months and treatment with Ciprodex, biopsy may be indicated    Shanice COCHRAN  [facility] ENT

## 2023-04-12 DIAGNOSIS — E04.1 THYROID NODULE: Primary | ICD-10-CM

## 2023-06-26 ENCOUNTER — TRANSFERRED RECORDS (OUTPATIENT)
Dept: HEALTH INFORMATION MANAGEMENT | Facility: CLINIC | Age: 73
End: 2023-06-26

## 2023-07-01 ENCOUNTER — HEALTH MAINTENANCE LETTER (OUTPATIENT)
Age: 73
End: 2023-07-01

## 2023-07-10 ENCOUNTER — OFFICE VISIT (OUTPATIENT)
Dept: OTOLARYNGOLOGY | Facility: OTHER | Age: 73
End: 2023-07-10
Attending: NURSE PRACTITIONER
Payer: COMMERCIAL

## 2023-07-10 VITALS
OXYGEN SATURATION: 96 % | DIASTOLIC BLOOD PRESSURE: 68 MMHG | SYSTOLIC BLOOD PRESSURE: 108 MMHG | HEART RATE: 56 BPM | TEMPERATURE: 96.9 F

## 2023-07-10 DIAGNOSIS — H61.23 CERUMINOSIS, BILATERAL: Primary | ICD-10-CM

## 2023-07-10 PROCEDURE — 69210 REMOVE IMPACTED EAR WAX UNI: CPT | Performed by: NURSE PRACTITIONER

## 2023-07-10 PROCEDURE — 92504 EAR MICROSCOPY EXAMINATION: CPT | Performed by: NURSE PRACTITIONER

## 2023-07-10 PROCEDURE — G0463 HOSPITAL OUTPT CLINIC VISIT: HCPCS | Performed by: NURSE PRACTITIONER

## 2023-07-10 PROCEDURE — 99212 OFFICE O/P EST SF 10 MIN: CPT | Mod: 25 | Performed by: NURSE PRACTITIONER

## 2023-07-10 ASSESSMENT — PAIN SCALES - GENERAL: PAINLEVEL: NO PAIN (1)

## 2023-07-10 NOTE — PATIENT INSTRUCTIONS
Thank you for allowing Shanice Robledo and our ENT team to participate in your care.  If your medications are too expensive, please give the nurse a call.  We can possibly change this medication.  If you have a scheduling or an appointment question please contact our Health Unit Coordinator at their direct line [phone] ext [phone].   ALL nursing questions or concerns can be directed to your ENT nurse at: [phone] - Efv     Follow up in 4 months for repeat cleaning  Follow up sooner with concerns or changes

## 2023-07-10 NOTE — PROGRESS NOTES
Otolaryngology Note         Chief Complaint:     Patient presents with:  Follow Up: Ear cleaning           History of Present Illness:     Scott Akhtar is a 73 year old male who presents today for ear cleaning.    He denies concerns today, reports he is starting to feel a bit plugged with wax.      He was last seen in ENT on 4/10/23 for routine ear cleaning.  At that time there was a small lesion on the inferior left canal.  He was treated with Ciprodex with improvement.    No acute changes in hearing.  No bothersome tinnitus, vertigo, facial numbness or weakness.      No ootalgia, otorrhea.    No hx of COM or otologic surgeries.          Medications:     Current Outpatient Rx   Medication Sig Dispense Refill     allopurinol (ZYLOPRIM) 100 MG tablet Take 1 tablet (100 mg) by mouth 2 times daily 180 tablet 3     ASPIRIN EC PO Take 81 mg by mouth daily       Cholecalciferol (VITAMIN D3 PO) Take 5,000 Units by mouth daily 2000 to 5000       coenzyme Q-10 (CO-Q10) 50 MG capsule Take 2 capsules (100 mg) by mouth daily 180 capsule 0     ezetimibe (ZETIA) 10 MG tablet Take 10 mg by mouth daily       hydrochlorothiazide (HYDRODIURIL) 25 MG tablet Take 1 tablet (25 mg) by mouth daily 90 tablet 1     metFORMIN (GLUCOPHAGE) 500 MG tablet Take 1 tablet (500 mg) by mouth 2 times daily (with meals) 180 tablet 3     Multiple Minerals (CALCIUM-MAGNESIUM-ZINC) TABS Take 1 tablet by mouth daily       Omega-3 Fatty Acids (OMEGA-3 FISH OIL PO) Take 1,200 mg by mouth daily       ONETOUCH ULTRA test strip TEST ONCE A  strip 3     potassium chloride ER (KLOR-CON) 10 MEQ CR tablet Take 1 tablet (10 mEq) by mouth daily 10 tablet 0     pravastatin (PRAVACHOL) 10 MG tablet 1 tablet 2 times weekly. (Patient taking differently: 10 mg once a week Patient takes 1 tablet every Monday - 10 mg) 24 tablet 3     triamcinolone (KENALOG) 0.1 % external ointment Apply topically 2 times daily 30 g 0     cetirizine (ZYRTEC) 10 MG tablet Take  10 mg by mouth daily              Allergies:     Allergies: Pseudoephedrine hcl, Simvastatin, Sulfa antibiotics, Chinese ink, and Privet          Past Medical History:     Past Medical History:   Diagnosis Date     Aneurysm of thoracic aorta (H)      Arthritis 2019     Diabetes (H)      External thrombosed hemorrhoids      Hiatal hernia      Hypertension      Need for prophylactic vaccination and inoculation against unspecified single bacterial disease      Other chest pain             Past Surgical History:     Past Surgical History:   Procedure Laterality Date     ABDOMINAL AORTIC ANEURYSM REPAIR       BACK SURGERY      laminectomy L5S1     BIOPSY  1971    Dermatitis herpetiformis     COLONOSCOPY       COLONOSCOPY N/A 10/14/2016    Procedure: COLONOSCOPY;  Surgeon: Daljit Salazar MD;  Location: [facility]     EXCISE LESION BACK N/A 2023    Procedure: EXCISION OF MASSES ON BACK AND LEFT POSTERIOR ARM;  Surgeon: Bello Lentz MD;  Location: [facility]     EYE SURGERY      Lasik     FINGER GANGLION CYST EXCISION       GENITOURINARY SURGERY      Vasectomy     L5 S1 Laminectomy  1991     ROTATOR CUFF REPAIR RT/LT  2006     SHOULDER SURGERY Right     replacement     TONSILLECTOMY  1971     VASCULAR SURGERY      Aortic aneurysm stent       ENT family history reviewed         Social History:     Social History     Tobacco Use     Smoking status: Former     Packs/day: 1.00     Years: 27.00     Pack years: 27.00     Types: Cigarettes     Start date: 1961     Quit date: 3/26/1988     Years since quittin.3     Smokeless tobacco: Never   Substance Use Topics     Alcohol use: Yes     Comment: 3 beers daily      Drug use: Never     Comment: medical marijuana            Review of Systems:     ROS: See HPI         Physical Exam:     /68 (BP Location: Right arm, Patient Position: Sitting, Cuff Size: Adult Regular)   Pulse 56   Temp 96.9  F (36.1  C) (Tympanic)   SpO2 96%      General - The patient is well nourished and well developed, and appears to have good nutritional status.  Alert and oriented to person and place, answers questions and cooperates with examination appropriately.   Head and Face - Normocephalic and atraumatic, with no gross asymmetry noted.  The facial nerve is intact, with strong symmetric movements.  Voice and Breathing - The patient was breathing comfortably without the use of accessory muscles. There was no wheezing, stridor. The patients voice was clear and strong, and had appropriate pitch and quality.  Ears - The ears were examined with binocular microscopy and with otoscope.  External ears normal. Right canal ceruminosis.  Left canal ceruminosis.  The right ear was cleaned with #7 suction and cerumen loop.   Right tympanic membrane is intact without effusion, retraction or mass. The left ear was cleaned with #7 suction and cerumen loop.  Left tympanic membrane is intact without effusion, retraction or mass.  Eyes - Extraocular movements intact, sclera were not icteric or injected, conjunctiva were pink and moist.  Mouth - Examination of the oral cavity showed pink, healthy oral mucosa. Dentition in good condition. No lesions or ulcerations noted. The tongue was mobile and midline.   Throat - The walls of the oropharynx were smooth, pink, moist, symmetric, and had no lesions or ulcerations.  The tonsillar pillars and soft palate were symmetric. The uvula was midline on elevation.    Neck - Full range of motion on passive movement.  Palpation of the occipital, submental, submandibular, internal jugular chain, and supraclavicular nodes did not demonstrate any abnormal lymph nodes or masses.  Palpation of the thyroid was soft and smooth, with no nodules or goiter appreciated.  The trachea was mobile and midline.  Nose - External contour is symmetric, no gross deflection or scars.  Nasal mucosa is pink and moist with no abnormal mucus.  The septum and  turbinates were evaluated with nasal speculum, no polyps, masses, or purulence noted on examination.         Assessment and Plan:       ICD-10-CM    1. Ceruminosis, bilateral  H61.23         Follow up in 4 months for repeat cleaning  Follow up sooner with concerns or changes     Ears were cleaned, tolerated well    The ears were cleaned today.  The patient may return here as needed or with their primary physician.  Aural hygiene was discussed.  Avoidance of Q-tips was reiterated.  Avoid flushing the ear canal if there is a possibility of a hole or perforation in the tympanic membrane.   If there are any unresolved concerns regarding hearing loss an audiogram is recommended.    Shanice Robledo NP-C  [facility]

## 2023-07-10 NOTE — LETTER
7/10/2023         RE: Scott Akhtar  [address] [facility] [address]        Dear Colleague,    Thank you for referring your patient, Scott Akhtar, to the [facility]. Please see a copy of my visit note below.    Otolaryngology Note         Chief Complaint:     Patient presents with:  Follow Up: Ear cleaning           History of Present Illness:     Scott Akhtar is a 73 year old male who presents today for ear cleaning.    He denies concerns today, reports he is starting to feel a bit plugged with wax.      He was last seen in ENT on 4/10/23 for routine ear cleaning.  At that time there was a small lesion on the inferior left canal.  He was treated with Ciprodex with improvement.    No acute changes in hearing.  No bothersome tinnitus, vertigo, facial numbness or weakness.      No ootalgia, otorrhea.    No hx of COM or otologic surgeries.          Medications:     Current Outpatient Rx   Medication Sig Dispense Refill     allopurinol (ZYLOPRIM) 100 MG tablet Take 1 tablet (100 mg) by mouth 2 times daily 180 tablet 3     ASPIRIN EC PO Take 81 mg by mouth daily       Cholecalciferol (VITAMIN D3 PO) Take 5,000 Units by mouth daily 2000 to 5000       coenzyme Q-10 (CO-Q10) 50 MG capsule Take 2 capsules (100 mg) by mouth daily 180 capsule 0     ezetimibe (ZETIA) 10 MG tablet Take 10 mg by mouth daily       hydrochlorothiazide (HYDRODIURIL) 25 MG tablet Take 1 tablet (25 mg) by mouth daily 90 tablet 1     metFORMIN (GLUCOPHAGE) 500 MG tablet Take 1 tablet (500 mg) by mouth 2 times daily (with meals) 180 tablet 3     Multiple Minerals (CALCIUM-MAGNESIUM-ZINC) TABS Take 1 tablet by mouth daily       Omega-3 Fatty Acids (OMEGA-3 FISH OIL PO) Take 1,200 mg by mouth daily       ONETOUCH ULTRA test strip TEST ONCE A  strip 3     potassium chloride ER (KLOR-CON) 10 MEQ CR tablet Take 1 tablet (10 mEq) by mouth daily 10 tablet 0     pravastatin (PRAVACHOL) 10 MG tablet 1  tablet 2 times weekly. (Patient taking differently: 10 mg once a week Patient takes 1 tablet every Monday - 10 mg) 24 tablet 3     triamcinolone (KENALOG) 0.1 % external ointment Apply topically 2 times daily 30 g 0     cetirizine (ZYRTEC) 10 MG tablet Take 10 mg by mouth daily              Allergies:     Allergies: Pseudoephedrine hcl, Simvastatin, Sulfa antibiotics, Chinese ink, and Privet          Past Medical History:     Past Medical History:   Diagnosis Date     Aneurysm of thoracic aorta (H)      Arthritis 2019     Diabetes (H)      External thrombosed hemorrhoids      Hiatal hernia      Hypertension      Need for prophylactic vaccination and inoculation against unspecified single bacterial disease      Other chest pain             Past Surgical History:     Past Surgical History:   Procedure Laterality Date     ABDOMINAL AORTIC ANEURYSM REPAIR       BACK SURGERY      laminectomy L5S1     BIOPSY      Dermatitis herpetiformis     COLONOSCOPY       COLONOSCOPY N/A 10/14/2016    Procedure: COLONOSCOPY;  Surgeon: Daljit Salazar MD;  Location: [facility]     EXCISE LESION BACK N/A 2023    Procedure: EXCISION OF MASSES ON BACK AND LEFT POSTERIOR ARM;  Surgeon: Bello Lentz MD;  Location: [facility]     EYE SURGERY      Lasik     FINGER GANGLION CYST EXCISION       GENITOURINARY SURGERY      Vasectomy     L5 S1 Laminectomy  1991     ROTATOR CUFF REPAIR RT/LT  2006     SHOULDER SURGERY Right     replacement     TONSILLECTOMY  1971     VASCULAR SURGERY      Aortic aneurysm stent       ENT family history reviewed         Social History:     Social History     Tobacco Use     Smoking status: Former     Packs/day: 1.00     Years: 27.00     Pack years: 27.00     Types: Cigarettes     Start date: 1961     Quit date: 3/26/1988     Years since quittin.3     Smokeless tobacco: Never   Substance Use Topics     Alcohol use: Yes     Comment: 3 beers daily      Drug use: Never      Comment: medical marijuana            Review of Systems:     ROS: See HPI         Physical Exam:     /68 (BP Location: Right arm, Patient Position: Sitting, Cuff Size: Adult Regular)   Pulse 56   Temp 96.9  F (36.1  C) (Tympanic)   SpO2 96%     General - The patient is well nourished and well developed, and appears to have good nutritional status.  Alert and oriented to person and place, answers questions and cooperates with examination appropriately.   Head and Face - Normocephalic and atraumatic, with no gross asymmetry noted.  The facial nerve is intact, with strong symmetric movements.  Voice and Breathing - The patient was breathing comfortably without the use of accessory muscles. There was no wheezing, stridor. The patients voice was clear and strong, and had appropriate pitch and quality.  Ears - The ears were examined with binocular microscopy and with otoscope.  External ears normal. Right canal ceruminosis.  Left canal ceruminosis.  The right ear was cleaned with #7 suction and cerumen loop.   Right tympanic membrane is intact without effusion, retraction or mass. The left ear was cleaned with #7 suction and cerumen loop.  Left tympanic membrane is intact without effusion, retraction or mass.  Eyes - Extraocular movements intact, sclera were not icteric or injected, conjunctiva were pink and moist.  Mouth - Examination of the oral cavity showed pink, healthy oral mucosa. Dentition in good condition. No lesions or ulcerations noted. The tongue was mobile and midline.   Throat - The walls of the oropharynx were smooth, pink, moist, symmetric, and had no lesions or ulcerations.  The tonsillar pillars and soft palate were symmetric. The uvula was midline on elevation.    Neck - Full range of motion on passive movement.  Palpation of the occipital, submental, submandibular, internal jugular chain, and supraclavicular nodes did not demonstrate any abnormal lymph nodes or masses.  Palpation of the  thyroid was soft and smooth, with no nodules or goiter appreciated.  The trachea was mobile and midline.  Nose - External contour is symmetric, no gross deflection or scars.  Nasal mucosa is pink and moist with no abnormal mucus.  The septum and turbinates were evaluated with nasal speculum, no polyps, masses, or purulence noted on examination.         Assessment and Plan:       ICD-10-CM    1. Ceruminosis, bilateral  H61.23         Follow up in 4 months for repeat cleaning  Follow up sooner with concerns or changes     Ears were cleaned, tolerated well    The ears were cleaned today.  The patient may return here as needed or with their primary physician.  Aural hygiene was discussed.  Avoidance of Q-tips was reiterated.  Avoid flushing the ear canal if there is a possibility of a hole or perforation in the tympanic membrane.   If there are any unresolved concerns regarding hearing loss an audiogram is recommended.    Shanice COCHRAN  [facility]NT        Again, thank you for allowing me to participate in the care of your patient.        Sincerely,        Shanice Robledo NP

## 2023-07-12 DIAGNOSIS — N52.9 ERECTILE DYSFUNCTION, UNSPECIFIED ERECTILE DYSFUNCTION TYPE: Primary | ICD-10-CM

## 2023-07-14 RX ORDER — SILDENAFIL CITRATE 50 MG
TABLET ORAL
Qty: 1 TABLET | Refills: 0 | Status: SHIPPED | OUTPATIENT
Start: 2023-07-14 | End: 2023-09-08

## 2023-07-14 NOTE — TELEPHONE ENCOUNTER
Viagra      Last Written Prescription Date:  unknown  Last Fill Quantity: unknown  Last Office Visit: 03/08/23  Future Office visit:    Next 5 appointments (look out 90 days)    Sep 08, 2023 11:00 AM  (Arrive by 10:45 AM)  SHORT with Usha Gray NP  [facility] [place] ([facility] [place] ) [address]  [place] [address]  [phone]           Routing refill request to provider for review/approval because:  Rx has not been filled at [facility] in the past    
no
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
## 2023-09-07 ENCOUNTER — MYC REFILL (OUTPATIENT)
Dept: FAMILY MEDICINE | Facility: OTHER | Age: 73
End: 2023-09-07

## 2023-09-07 DIAGNOSIS — I10 ESSENTIAL HYPERTENSION: ICD-10-CM

## 2023-09-07 RX ORDER — POTASSIUM CHLORIDE 750 MG/1
10 TABLET, EXTENDED RELEASE ORAL DAILY
Qty: 10 TABLET | Refills: 0 | Status: CANCELLED | OUTPATIENT
Start: 2023-09-07

## 2023-09-07 NOTE — TELEPHONE ENCOUNTER
Potassium Chloride ER (Klor-Con) 10 MEQ CR tablet    Last Written Prescription Date:  03/18/2022  Last Fill Quantity: 10,   # refills: 0  Last Office Visit: 03/08/2023

## 2023-09-08 ENCOUNTER — OFFICE VISIT (OUTPATIENT)
Dept: FAMILY MEDICINE | Facility: OTHER | Age: 73
End: 2023-09-08
Attending: NURSE PRACTITIONER
Payer: MEDICARE

## 2023-09-08 VITALS
TEMPERATURE: 96.6 F | HEART RATE: 68 BPM | DIASTOLIC BLOOD PRESSURE: 80 MMHG | WEIGHT: 222 LBS | OXYGEN SATURATION: 97 % | BODY MASS INDEX: 32.78 KG/M2 | SYSTOLIC BLOOD PRESSURE: 120 MMHG

## 2023-09-08 DIAGNOSIS — E11.9 CONTROLLED TYPE 2 DIABETES MELLITUS WITHOUT COMPLICATION, WITHOUT LONG-TERM CURRENT USE OF INSULIN (H): Primary | ICD-10-CM

## 2023-09-08 DIAGNOSIS — E11.69 HYPERLIPIDEMIA ASSOCIATED WITH TYPE 2 DIABETES MELLITUS (H): ICD-10-CM

## 2023-09-08 DIAGNOSIS — M62.838 MUSCLE SPASM: ICD-10-CM

## 2023-09-08 DIAGNOSIS — K21.9 GASTROESOPHAGEAL REFLUX DISEASE WITHOUT ESOPHAGITIS: ICD-10-CM

## 2023-09-08 DIAGNOSIS — K76.0 HEPATIC STEATOSIS: ICD-10-CM

## 2023-09-08 DIAGNOSIS — M10.00 IDIOPATHIC GOUT, UNSPECIFIED CHRONICITY, UNSPECIFIED SITE: ICD-10-CM

## 2023-09-08 DIAGNOSIS — I10 ESSENTIAL HYPERTENSION: ICD-10-CM

## 2023-09-08 DIAGNOSIS — B37.2 CANDIDAL INTERTRIGO: ICD-10-CM

## 2023-09-08 DIAGNOSIS — Z11.3 SCREEN FOR STD (SEXUALLY TRANSMITTED DISEASE): ICD-10-CM

## 2023-09-08 DIAGNOSIS — E78.5 HYPERLIPIDEMIA ASSOCIATED WITH TYPE 2 DIABETES MELLITUS (H): ICD-10-CM

## 2023-09-08 DIAGNOSIS — Z78.9 ALCOHOL USE: ICD-10-CM

## 2023-09-08 LAB
ALBUMIN SERPL BCG-MCNC: 4.8 G/DL (ref 3.5–5.2)
ALP SERPL-CCNC: 63 U/L (ref 40–129)
ALT SERPL W P-5'-P-CCNC: 47 U/L (ref 0–70)
ANION GAP SERPL CALCULATED.3IONS-SCNC: 13 MMOL/L (ref 7–15)
AST SERPL W P-5'-P-CCNC: 41 U/L (ref 0–45)
BILIRUB SERPL-MCNC: 0.4 MG/DL
BUN SERPL-MCNC: 17.2 MG/DL (ref 8–23)
C TRACH DNA SPEC QL PROBE+SIG AMP: NEGATIVE
CALCIUM SERPL-MCNC: 9.9 MG/DL (ref 8.8–10.2)
CHLORIDE SERPL-SCNC: 98 MMOL/L (ref 98–107)
CHOLEST SERPL-MCNC: 132 MG/DL
CREAT SERPL-MCNC: 0.92 MG/DL (ref 0.67–1.17)
CREAT UR-MCNC: 173.6 MG/DL
DEPRECATED HCO3 PLAS-SCNC: 27 MMOL/L (ref 22–29)
EGFRCR SERPLBLD CKD-EPI 2021: 88 ML/MIN/1.73M2
EST. AVERAGE GLUCOSE BLD GHB EST-MCNC: 151 MG/DL
GLUCOSE SERPL-MCNC: 126 MG/DL (ref 70–99)
HBA1C MFR BLD: 6.9 %
HDLC SERPL-MCNC: 33 MG/DL
LDLC SERPL CALC-MCNC: 71 MG/DL
MICROALBUMIN UR-MCNC: 18 MG/L
MICROALBUMIN/CREAT UR: 10.37 MG/G CR (ref 0–17)
N GONORRHOEA DNA SPEC QL NAA+PROBE: NEGATIVE
NONHDLC SERPL-MCNC: 99 MG/DL
POTASSIUM SERPL-SCNC: 4 MMOL/L (ref 3.4–5.3)
PROT SERPL-MCNC: 8.3 G/DL (ref 6.4–8.3)
SODIUM SERPL-SCNC: 138 MMOL/L (ref 136–145)
TRIGL SERPL-MCNC: 141 MG/DL

## 2023-09-08 PROCEDURE — 87491 CHLMYD TRACH DNA AMP PROBE: CPT | Mod: ZL | Performed by: NURSE PRACTITIONER

## 2023-09-08 PROCEDURE — 86780 TREPONEMA PALLIDUM: CPT | Mod: ZL | Performed by: NURSE PRACTITIONER

## 2023-09-08 PROCEDURE — 87591 N.GONORRHOEAE DNA AMP PROB: CPT | Mod: ZL | Performed by: NURSE PRACTITIONER

## 2023-09-08 PROCEDURE — 80053 COMPREHEN METABOLIC PANEL: CPT | Mod: ZL | Performed by: NURSE PRACTITIONER

## 2023-09-08 PROCEDURE — 82570 ASSAY OF URINE CREATININE: CPT | Mod: ZL | Performed by: NURSE PRACTITIONER

## 2023-09-08 PROCEDURE — 87389 HIV-1 AG W/HIV-1&-2 AB AG IA: CPT | Mod: ZL | Performed by: NURSE PRACTITIONER

## 2023-09-08 PROCEDURE — 83718 ASSAY OF LIPOPROTEIN: CPT | Mod: ZL | Performed by: NURSE PRACTITIONER

## 2023-09-08 PROCEDURE — 86706 HEP B SURFACE ANTIBODY: CPT | Mod: ZL | Performed by: NURSE PRACTITIONER

## 2023-09-08 PROCEDURE — 86803 HEPATITIS C AB TEST: CPT | Mod: ZL | Performed by: NURSE PRACTITIONER

## 2023-09-08 PROCEDURE — 87340 HEPATITIS B SURFACE AG IA: CPT | Mod: ZL | Performed by: NURSE PRACTITIONER

## 2023-09-08 PROCEDURE — 83036 HEMOGLOBIN GLYCOSYLATED A1C: CPT | Mod: ZL | Performed by: NURSE PRACTITIONER

## 2023-09-08 PROCEDURE — 99214 OFFICE O/P EST MOD 30 MIN: CPT | Performed by: NURSE PRACTITIONER

## 2023-09-08 PROCEDURE — 36415 COLL VENOUS BLD VENIPUNCTURE: CPT | Mod: ZL | Performed by: NURSE PRACTITIONER

## 2023-09-08 PROCEDURE — G0463 HOSPITAL OUTPT CLINIC VISIT: HCPCS

## 2023-09-08 RX ORDER — NYSTATIN 100000 [USP'U]/G
POWDER TOPICAL 4 TIMES DAILY
Qty: 60 G | Refills: 1 | Status: SHIPPED | OUTPATIENT
Start: 2023-09-08

## 2023-09-08 RX ORDER — POTASSIUM CHLORIDE 750 MG/1
10 TABLET, EXTENDED RELEASE ORAL DAILY
Qty: 10 TABLET | Refills: 0 | Status: SHIPPED | OUTPATIENT
Start: 2023-09-08 | End: 2023-09-08

## 2023-09-08 RX ORDER — OMEPRAZOLE 20 MG/1
20 TABLET, DELAYED RELEASE ORAL DAILY
Qty: 90 TABLET | Refills: 1 | Status: SHIPPED | OUTPATIENT
Start: 2023-09-08 | End: 2024-01-17

## 2023-09-08 RX ORDER — POTASSIUM CHLORIDE 750 MG/1
10 TABLET, EXTENDED RELEASE ORAL DAILY
Qty: 90 TABLET | Refills: 3 | Status: SHIPPED | OUTPATIENT
Start: 2023-09-08

## 2023-09-08 RX ORDER — EZETIMIBE 10 MG/1
10 TABLET ORAL DAILY
Qty: 90 TABLET | Refills: 3 | Status: SHIPPED | OUTPATIENT
Start: 2023-09-08

## 2023-09-08 ASSESSMENT — PAIN SCALES - GENERAL: PAINLEVEL: MILD PAIN (3)

## 2023-09-09 LAB
HBV SURFACE AB SERPL IA-ACNC: 0.57 M[IU]/ML
HBV SURFACE AB SERPL IA-ACNC: NONREACTIVE M[IU]/ML
HBV SURFACE AG SERPL QL IA: NONREACTIVE
HCV AB SERPL QL IA: NONREACTIVE
HIV 1+2 AB+HIV1 P24 AG SERPL QL IA: NONREACTIVE
T PALLIDUM AB SER QL: NONREACTIVE

## 2023-09-27 NOTE — PATIENT INSTRUCTIONS
Thank you for allowing Shanice Robledo and our ENT team to participate in your care.  If your medications are too expensive, please give the nurse a call.  We can possibly change this medication.  If you have a scheduling or an appointment question please contact our Health Unit Coordinator at their direct line [phone] ext [phone].   ALL nursing questions or concerns can be directed to your ENT nurse at: [phone] - [place]     Follow up in 3 months for repeat cleaning  Follow up for audiogram    [facility] the [facility]    To see if you have medical coverage under your Medicare program, call [facility] at  [phone]  
0 = swallows foods/liquids without difficulty

## 2023-11-13 ENCOUNTER — TELEPHONE (OUTPATIENT)
Dept: FAMILY MEDICINE | Facility: OTHER | Age: 73
End: 2023-11-13

## 2023-11-13 NOTE — TELEPHONE ENCOUNTER
1:40 PM    Reason for Call: OVERBOOK    Patient is having the following symptoms: pt called he has had a sinus infection for the past week, he is wanting to know if he can get some antibiotics or to be seen     The patient is requesting an appointment for soon with Isaac.    Was an appointment offered for this call? No  If yes : Appointment type              Date    Preferred method for responding to this message: Telephone Call  What is your phone number ? [phone]    If we cannot reach you directly, may we leave a detailed response at the number you provided? Yes    Can this message wait until your PCP/provider returns, if unavailable today? Not applicable    Miranda Dockery

## 2023-12-19 ENCOUNTER — TRANSFERRED RECORDS (OUTPATIENT)
Dept: HEALTH INFORMATION MANAGEMENT | Facility: HOSPITAL | Age: 73
End: 2023-12-19

## 2023-12-19 LAB
ALBUMIN (URINE) MG/SPEC: 31.6 MG/L
ALT SERPL-CCNC: 39 U/L (ref 13–61)
AST SERPL-CCNC: 38 U/L (ref 15–37)
CHOLESTEROL (EXTERNAL): 162 MG/DL (ref 0–200)
CREATININE (EXTERNAL): 1 MG/DL (ref 0.7–1.2)
CREATININE (URINE): 250.5 MG/DL (ref 58–161)
GFR ESTIMATED (EXTERNAL): 80 ML/MIN/1.73M2
GLUCOSE (EXTERNAL): 125 MG/DL (ref 74–106)
HBA1C MFR BLD: 6.6 % (ref 4–6)
HDLC SERPL-MCNC: 31 MG/DL
LDL CHOLESTEROL CALCULATED (EXTERNAL): 95 MG/DL
NON HDL CHOLESTEROL (EXTERNAL): 131 MG/DL
POTASSIUM (EXTERNAL): 3.6 MMOL/L (ref 3.5–5)
TRIGLYCERIDES (EXTERNAL): 182 MG/DL

## 2023-12-20 ENCOUNTER — TRANSFERRED RECORDS (OUTPATIENT)
Dept: HEALTH INFORMATION MANAGEMENT | Facility: CLINIC | Age: 73
End: 2023-12-20
Payer: COMMERCIAL

## 2023-12-20 LAB — RETINOPATHY: NEGATIVE

## 2024-01-15 PROBLEM — M62.81 MUSCLE WEAKNESS: Status: RESOLVED | Noted: 2017-10-06 | Resolved: 2024-01-15

## 2024-01-15 NOTE — PROGRESS NOTES
Assessment & Plan     (E11.9) Controlled type 2 diabetes mellitus without complication, without long-term current use of insulin (H)  (primary encounter diagnosis)  Plan: [facility] DM very well controlled. On statin. BP at goal. Eye exam UTD. Will reassess in 3 months, sooner with new or worsening symptoms.     (I10) Essential hypertension  Plan: Well controlled. Continue current medications. Encouraged daily exercise and a low sodium diet. Recommended checking BP's 2x/wk, call the clinic if consistantly s>140 or d>90. Follow up in 3 months.       (K21.9) Gastroesophageal reflux disease without esophagitis  Comment: symptoms much improved, no longer taking the omeprazole  Plan: Will return with new or worsening symptoms.     (E11.69,  E78.5) Hyperlipidemia associated with type 2 diabetes mellitus (H)  Comment: tolerating statin  Plan: Recent AST and ALT and [facility]. Will continue statin.     (Z12.11) Screen for colon cancer  Plan: Colonoscopy Screening  Referral        Colonoscopy last done in 2016. Was recommended to repeat 5 years. Asymptomatic. Will refer back to surgery.     (L93.1) Subacute cutaneous lupus erythematosus-rheum did not feel he had systemic lupus; felt to be related to cutaneous lupus  Plan: No issues. Declines to see derm, but will let me know if he changes his mind. Will then place referral.       Return in about 3 months (around 4/17/2024).    Frances Chavez is a 73 year old, presenting for the following health issues:  Hypertension, Diabetes, and Derm Problem      HPI     Diabetes Follow-up    How often are you checking your blood sugar? A few times a week  What time of day are you checking your blood sugars (select all that apply)?   Morning time   Have you had any blood sugars above 200?  No  Have you had any blood sugars below 70?  No  What symptoms do you notice when your blood sugar is low?  None and Not applicable  What concerns do you  have today about your diabetes? None   Do you have any of these symptoms? (Select all that apply)  No numbness or tingling in feet.  No redness, sores or blisters on feet.  No complaints of excessive thirst.  No reports of blurry vision.  No significant changes to weight.  A1C was 6.6 on 12/22/23-[facility]. Taking Metformin 1000 mg ER BID without side effects.   Denies chest pain, shortness of breath, dizziness, syncope, or palpitations.      Hepatic steatosis; AST and ALT normal in 9/2023; does drink one shot and one beer 4 times during the week, admits to drinking more beer on the weekends, notes that he never drinks more than 6 beers in one setting; working on weight loss-trying to eat less; [facility].     Hyperlipidemia Follow-Up    Are you regularly taking any medication or supplement to lower your cholesterol?   Yes- Zetia; does not tolerate statins.   Are you having muscle aches or other side effects that you think could be caused by your cholesterol lowering medication?  No  Denies chest pain, shortness of breath, dizziness, syncope, or palpitations.    Does not tolerate statins-has severe muscle aches.     Hypertension Follow-up    Do you check your blood pressure regularly outside of the clinic? No   Are you following a low salt diet? No  Are your blood pressures ever more than 140 on the top number (systolic) OR more   than 90 on the bottom number (diastolic), for example 140/90? Patient does not check BP outside of the clinic   Taking hydrochlorothiazide without side effects. Also on potassium supplements.   Does have a h/o gout. On allopurinol. No recent flares.     GERD; was taking omeprazole, but symptoms have resolved, no longer taking. No nausea or vomiting.     Due for a colonoscopy. Last done in 2016-recommended repeating 5 years. Agreeable.     He does have cutaneous lupus. Has not recently seen dermatology. Gets worse during the summer months.     BP Readings  from Last 2 Encounters:   01/17/24 124/85   09/08/23 120/80     Hemoglobin A1C (%)   Date Value   09/08/2023 6.9 (H)   03/08/2023 6.8 (H)     LDL Cholesterol Calculated (mg/dL)   Date Value   09/08/2023 71   09/08/2022 63     How many servings of fruits and vegetables do you eat daily?  2-3  On average, how many sweetened beverages do you drink each day (Examples: soda, juice, sweet tea, etc.  Do NOT count diet or artificially sweetened beverages)?   0  How many days per week do you exercise enough to make your heart beat faster? 7  How many minutes a day do you exercise enough to make your heart beat faster? 10 - 19  How many days per week do you miss taking your medication? 0      Review of Systems   Constitutional, HEENT, cardiovascular, pulmonary, gi and gu systems are negative, except as otherwise noted.      Objective    /85 (BP Location: Left arm, Patient Position: Sitting, Cuff Size: Adult Large)   Pulse 60   Temp 97.3  F (36.3  C) (Tympanic)   Wt 98.9 kg (218 lb)   SpO2 96%   BMI 32.19 kg/m    Body mass index is 32.19 kg/m .  Physical Exam   GENERAL: alert, no distress, and obese  EYES: Eyes grossly normal to inspection, PERRL and conjunctivae and sclerae normal  HENT: ear canals and TM's normal, nose and mouth without ulcers or lesions  NECK: no adenopathy, no asymmetry, masses, or scars  RESP: lungs clear to auscultation - no rales, rhonchi or wheezes  CV: regular rate and rhythm, normal S1 S2, no S3 or S4, no murmur, click or rub, no peripheral edema  NEURO: Normal strength and tone, mentation intact and speech normal  PSYCH: mentation appears normal, affect normal/bright  Diabetic foot exam: normal DP and PT pulses, no trophic changes or ulcerative lesions, and normal sensory exam    Urine albumin in process. [facility].

## 2024-01-17 ENCOUNTER — OFFICE VISIT (OUTPATIENT)
Dept: FAMILY MEDICINE | Facility: OTHER | Age: 74
End: 2024-01-17
Attending: NURSE PRACTITIONER
Payer: COMMERCIAL

## 2024-01-17 VITALS
OXYGEN SATURATION: 96 % | WEIGHT: 218 LBS | DIASTOLIC BLOOD PRESSURE: 85 MMHG | TEMPERATURE: 97.3 F | SYSTOLIC BLOOD PRESSURE: 124 MMHG | BODY MASS INDEX: 32.19 KG/M2 | HEART RATE: 60 BPM

## 2024-01-17 DIAGNOSIS — E11.69 HYPERLIPIDEMIA ASSOCIATED WITH TYPE 2 DIABETES MELLITUS (H): ICD-10-CM

## 2024-01-17 DIAGNOSIS — K21.9 GASTROESOPHAGEAL REFLUX DISEASE WITHOUT ESOPHAGITIS: ICD-10-CM

## 2024-01-17 DIAGNOSIS — I71.21 ANEURYSM OF ASCENDING AORTA WITHOUT RUPTURE (H): ICD-10-CM

## 2024-01-17 DIAGNOSIS — E78.5 HYPERLIPIDEMIA ASSOCIATED WITH TYPE 2 DIABETES MELLITUS (H): ICD-10-CM

## 2024-01-17 DIAGNOSIS — L93.1 SUBACUTE CUTANEOUS LUPUS ERYTHEMATOSUS: ICD-10-CM

## 2024-01-17 DIAGNOSIS — I10 ESSENTIAL HYPERTENSION: ICD-10-CM

## 2024-01-17 DIAGNOSIS — E11.9 CONTROLLED TYPE 2 DIABETES MELLITUS WITHOUT COMPLICATION, WITHOUT LONG-TERM CURRENT USE OF INSULIN (H): Primary | ICD-10-CM

## 2024-01-17 DIAGNOSIS — Z12.11 SCREEN FOR COLON CANCER: ICD-10-CM

## 2024-01-17 LAB
CREAT UR-MCNC: 211.5 MG/DL
MICROALBUMIN UR-MCNC: <12 MG/L
MICROALBUMIN/CREAT UR: NORMAL MG/G{CREAT}

## 2024-01-17 PROCEDURE — 82570 ASSAY OF URINE CREATININE: CPT | Mod: ZL | Performed by: NURSE PRACTITIONER

## 2024-01-17 PROCEDURE — G0463 HOSPITAL OUTPT CLINIC VISIT: HCPCS

## 2024-01-17 PROCEDURE — 99214 OFFICE O/P EST MOD 30 MIN: CPT | Performed by: NURSE PRACTITIONER

## 2024-01-17 RX ORDER — BLOOD SUGAR DIAGNOSTIC
STRIP MISCELLANEOUS
Qty: 100 STRIP | Refills: 3 | Status: SHIPPED | OUTPATIENT
Start: 2024-01-17

## 2024-01-17 ASSESSMENT — PAIN SCALES - GENERAL: PAINLEVEL: NO PAIN (0)

## 2024-01-24 ENCOUNTER — OFFICE VISIT (OUTPATIENT)
Dept: SURGERY | Facility: OTHER | Age: 74
End: 2024-01-24
Attending: NURSE PRACTITIONER
Payer: COMMERCIAL

## 2024-01-24 ENCOUNTER — PREP FOR PROCEDURE (OUTPATIENT)
Dept: SURGERY | Facility: OTHER | Age: 74
End: 2024-01-24

## 2024-01-24 VITALS
DIASTOLIC BLOOD PRESSURE: 80 MMHG | BODY MASS INDEX: 32.29 KG/M2 | OXYGEN SATURATION: 96 % | HEIGHT: 69 IN | SYSTOLIC BLOOD PRESSURE: 124 MMHG | WEIGHT: 218 LBS | HEART RATE: 71 BPM | TEMPERATURE: 96.1 F

## 2024-01-24 DIAGNOSIS — Z86.0100 HX OF COLONIC POLYP: ICD-10-CM

## 2024-01-24 DIAGNOSIS — K44.9 HIATAL HERNIA: ICD-10-CM

## 2024-01-24 DIAGNOSIS — K21.9 GASTROESOPHAGEAL REFLUX DISEASE WITHOUT ESOPHAGITIS: Primary | ICD-10-CM

## 2024-01-24 DIAGNOSIS — Z86.0100 HISTORY OF COLONIC POLYPS: ICD-10-CM

## 2024-01-24 DIAGNOSIS — K21.9 GERD (GASTROESOPHAGEAL REFLUX DISEASE): Primary | ICD-10-CM

## 2024-01-24 DIAGNOSIS — R19.5 CHANGE IN STOOL: ICD-10-CM

## 2024-01-24 DIAGNOSIS — R19.4 CHANGE IN BOWEL HABITS: ICD-10-CM

## 2024-01-24 PROCEDURE — G0463 HOSPITAL OUTPT CLINIC VISIT: HCPCS

## 2024-01-24 PROCEDURE — 99213 OFFICE O/P EST LOW 20 MIN: CPT | Performed by: NURSE PRACTITIONER

## 2024-01-24 RX ORDER — BISACODYL 5 MG/1
5 TABLET, DELAYED RELEASE ORAL DAILY PRN
Qty: 4 TABLET | Refills: 0 | Status: SHIPPED | OUTPATIENT
Start: 2024-01-24 | End: 2024-04-16

## 2024-01-24 ASSESSMENT — PAIN SCALES - GENERAL: PAINLEVEL: NO PAIN (0)

## 2024-01-24 NOTE — PROGRESS NOTES
CLINIC NOTE - CONSULT  1/24/2024    Patient : Scott Akhtar    Referring Physician :  Usha Gray NP    Reason for Referral : Upper and lower endoscopy    This is a 73 year old male with a need for an upper and lower endoscopy.  Upper endoscopy is needed for  GERD, hiatal hernia .  Lower endoscopy is needed for History of Colon Polyps and Change in Bowel Habits (larger in size, harder to pass).      History of GERD : YES  History of PUD : NO  On PPI's : NO--prilosec caused stomach pain    Last colonoscopy : 2016  Family history of colon cancer : NO  Family history of colon polyps : NO  Personal history of colon cancer : NO  Personal history of colon polyps : YES  Rectal bleeding : NO  Changes in bowel habits :YES  Personal history of inflammatory bowel disease : NO    Past Medical History:  Past Medical History:   Diagnosis Date    Aneurysm of thoracic aorta (H24)     Arthritis 2019    Diabetes (H)     External thrombosed hemorrhoids     Hiatal hernia     Hypertension     Need for prophylactic vaccination and inoculation against unspecified single bacterial disease     Other chest pain     Thyroid disease 2022    Benign biopsy       Past Surgical History:  Past Surgical History:   Procedure Laterality Date    ABDOMINAL AORTIC ANEURYSM REPAIR      BACK SURGERY      laminectomy L5S1    BIOPSY  1971    Dermatitis herpetiformis    COLONOSCOPY      COLONOSCOPY N/A 10/14/2016    Procedure: COLONOSCOPY;  Surgeon: Daljit Salazar MD;  Location: [facility]    EXCISE LESION BACK N/A 01/16/2023    Procedure: EXCISION OF MASSES ON BACK AND LEFT POSTERIOR ARM;  Surgeon: Bello Lentz MD;  Location: [facility]    EYE SURGERY  2007    Lasik    FINGER GANGLION CYST EXCISION      GENITOURINARY SURGERY  1990    Vasectomy    L5 S1 Laminectomy  01/01/1991    ROTATOR CUFF REPAIR RT/LT  01/01/2006    SHOULDER SURGERY Right     replacement    TONSILLECTOMY  01/01/1971    VASCULAR SURGERY      Aortic aneurysm stent       Family  History History:  Family History   Problem Relation Age of Onset    Depression Mother     Thyroid Disease Mother     Diabetes Father     Prostate Cancer Father     Hypertension Father     Breast Cancer No family hx of     Colon Cancer No family hx of        History of Tobacco Use:  History   Smoking Status    Former    Packs/day: 1.00    Years: 27.00    Types: Cigarettes, Cigars, Pipe    Start date: 6/1/1961    Quit date: 3/26/1988   Smokeless Tobacco    Never       Current Medications:  Current Outpatient Medications   Medication Sig Dispense Refill    allopurinol (ZYLOPRIM) 100 MG tablet Take 1 tablet (100 mg) by mouth 2 times daily 180 tablet 3    ASPIRIN EC PO Take 81 mg by mouth daily      Cholecalciferol (VITAMIN D3 PO) Take 5,000 Units by mouth daily 2000 to 5000      coenzyme Q-10 (CO-Q10) 50 MG capsule Take 2 capsules (100 mg) by mouth daily 180 capsule 0    ezetimibe (ZETIA) 10 MG tablet Take 1 tablet (10 mg) by mouth daily 90 tablet 3    hydrochlorothiazide (HYDRODIURIL) 25 MG tablet Take 1 tablet (25 mg) by mouth daily 90 tablet 1    metFORMIN (GLUCOPHAGE) 500 MG tablet Take 2 tablets (1,000 mg) by mouth 2 times daily (with meals)      Multiple Minerals (CALCIUM-MAGNESIUM-ZINC) TABS Take 1 tablet by mouth daily      nystatin (MYCOSTATIN) 475216 UNIT/GM external powder Apply topically 4 times daily 60 g 1    Omega-3 Fatty Acids (OMEGA-3 FISH OIL PO) Take 1,200 mg by mouth daily      ONETOUCH ULTRA test strip TEST ONCE A  strip 3    potassium chloride ER (KLOR-CON) 10 MEQ CR tablet Take 1 tablet (10 mEq) by mouth daily 90 tablet 3       Allergies:  Allergies   Allergen Reactions    Pseudoephedrine Hcl      Sudafed     Simvastatin GI Disturbance    Sulfa Antibiotics Nausea    Chinese Ink Other (See Comments)    Privet Other (See Comments)     Any Ink at all causes him to sneeze uncontrollable.     Short Ragweed Pollen Ext Cough, Itching, Rash and Difficulty breathing       ROS:  Constitutional:  "negative  Eyes: negative  Ears, nose, mouth, throat, and face: positive for ear problems  Respiratory: negative  Cardiovascular: negative  Gastrointestinal: positive for reflux symptoms and hiatal hernia, colon polyp history, change in bowels  Genitourinary:negative  Integument/breast: positive for lupus  Hematologic/lymphatic: negative  Musculoskeletal:positive for back problems  Neurological: positive for headaches  Behavioral/Psych: positive for alcohol 2-3 times a week  Endocrine: positive for diabetes  Allergic/Immunologic: negative    PHYSICAL EXAM:     Vital signs: /80 (BP Location: Right arm, Patient Position: Chair, Cuff Size: Adult Regular)   Pulse 71   Temp (!) 96.1  F (35.6  C) (Tympanic)   Ht 1.753 m (5' 9\")   Wt 98.9 kg (218 lb)   SpO2 96%   BMI 32.19 kg/m     BMI: Body mass index is 32.19 kg/m .   General: Normal, healthy, cooperative, in no acute distress, alert   Skin: no rashes   Lungs: clear to auscultation   CV: Regular rate and rhythm    Abdominal: non-distended, soft, non-tender to palpation   Extremities: No cyanosis, clubbing or edema noted bilaterally in Upper and Lower Extremities   Neurological: without deficit    Assessment:   73 year old male with need for upper endoscopy for Gastroesohpageal Reflux and hiatal hernia  and lower endoscopy for History of Colon Polyps and change in bowels :    Plan:   Will schedule an esophagogastroduodenoscopy and colonoscopy.  The procedures with their risks, benefits and alternatives were explained.  Risks include but are not limited to bleeding, perforation, missing lesions, need for additional procedures, reaction to anesthesia.  All the patients questions were answered.  The patient consents to proceed.  The procedures will be scheduled.     "

## 2024-01-24 NOTE — PATIENT INSTRUCTIONS
Thank you for allowing Evelina Bauer CNP and our surgical team to participate in your care. Please call our health unit coordinator at [phone] with scheduling questions or the nurse at [phone] with any other questions or concerns.      You have been scheduled for:  Upper endoscopy and colonoscopy with  on 2/22/24.   You will use Golytely bowel prep.  Please see handout for additional instruction.  You will not need a pre-operative appointment with your primary care provider.  You may call [phone] or [phone] with any questions.

## 2024-03-06 ENCOUNTER — MYC MEDICAL ADVICE (OUTPATIENT)
Dept: FAMILY MEDICINE | Facility: OTHER | Age: 74
End: 2024-03-06

## 2024-03-06 DIAGNOSIS — E11.9 CONTROLLED TYPE 2 DIABETES MELLITUS WITHOUT COMPLICATION, WITHOUT LONG-TERM CURRENT USE OF INSULIN (H): Primary | ICD-10-CM

## 2024-03-11 ENCOUNTER — TELEPHONE (OUTPATIENT)
Dept: FAMILY MEDICINE | Facility: OTHER | Age: 74
End: 2024-03-11

## 2024-03-11 NOTE — TELEPHONE ENCOUNTER
RECEIVED PA REQUEST FROM [employer] FOR blood glucose (NO BRAND SPECIFIED) test strip. SUBMITTED ON [facility], RESPONSE:

## 2024-03-13 ENCOUNTER — OFFICE VISIT (OUTPATIENT)
Dept: OTOLARYNGOLOGY | Facility: OTHER | Age: 74
End: 2024-03-13
Attending: NURSE PRACTITIONER
Payer: COMMERCIAL

## 2024-03-13 VITALS
DIASTOLIC BLOOD PRESSURE: 74 MMHG | BODY MASS INDEX: 31.1 KG/M2 | HEIGHT: 69 IN | OXYGEN SATURATION: 95 % | TEMPERATURE: 96.3 F | WEIGHT: 210 LBS | HEART RATE: 64 BPM | SYSTOLIC BLOOD PRESSURE: 110 MMHG

## 2024-03-13 DIAGNOSIS — J30.1 ALLERGIC REACTION TO GRASS POLLEN: ICD-10-CM

## 2024-03-13 DIAGNOSIS — E04.1 THYROID NODULE: ICD-10-CM

## 2024-03-13 DIAGNOSIS — H61.23 BILATERAL IMPACTED CERUMEN: Primary | ICD-10-CM

## 2024-03-13 PROCEDURE — 99213 OFFICE O/P EST LOW 20 MIN: CPT | Mod: 25 | Performed by: NURSE PRACTITIONER

## 2024-03-13 PROCEDURE — 69210 REMOVE IMPACTED EAR WAX UNI: CPT | Performed by: NURSE PRACTITIONER

## 2024-03-13 PROCEDURE — G0463 HOSPITAL OUTPT CLINIC VISIT: HCPCS | Mod: 25

## 2024-03-13 ASSESSMENT — PAIN SCALES - GENERAL: PAINLEVEL: NO PAIN (0)

## 2024-03-13 NOTE — LETTER
3/13/2024         RE: Scott Akhtar  [address] [facility] [address]        Dear Colleague,    Thank you for referring your patient, Scott Akhtar, to the [facility]. Please see a copy of my visit note below.    Otolaryngology Note         Chief Complaint:     Patient presents with:  Cerumen Impaction  Thyroid Problem: Recheck thyroid nodules           History of Present Illness:     Scott Akhtar is a 73 year old male seen today for routine ear cleaning.    Scott has a history of cerumen impaction, and is seen routinely every 4 to 6 months for ear cleaning.  He was last seen in ENT on 7/10/2023 for ear cleaning.  On exam both ears appeared healthy.    Today reports he has been feeling a bit plugged for the last month or so.  No acute changes in hearing.  No concerns for bothersome tinnitus, vertigo, facial numbness or weakness.  He denies otalgia.  He has been having some itching in his left ear worse than right.  No concerns for otorrhea.    He has a history of chronic cough, on exam and workup we noted a thyroid nodule.  Thyroid ultrasound completed 10/27/2022 showed a right greater than left thyroid nodule.  FNA completed of bilateral nodules on 11/10/2022 were both benign.  Follow-up ultrasound completed 4/10/2030 showed unchanged bilateral thyroid nodules.  Cough is now resolved.    Serum specific IgE inhalant panel completed 5/20/2022 showed moderate sensitivity to grass with a total IgE of 102.11.  He takes Claritin daily.         Medications:     Current Outpatient Rx   Medication Sig Dispense Refill     allopurinol (ZYLOPRIM) 100 MG tablet Take 1 tablet (100 mg) by mouth 2 times daily 180 tablet 3     ASPIRIN EC PO Take 81 mg by mouth daily       bisacodyl (DULCOLAX) 5 MG EC tablet Take 1 tablet (5 mg) by mouth daily as needed for constipation Take 2 tablets by mouth 2 days prior to procedure. Take the remaining 2 tablets by mouth at 3pm the day prior to  procedure. 4 tablet 0     blood glucose (NO BRAND SPECIFIED) test strip 1 strip by In Vitro route daily Use to test blood sugar 1 times daily or as directed. 100 strip 3     Cholecalciferol (VITAMIN D3 PO) Take 5,000 Units by mouth daily 2000 to 5000       coenzyme Q-10 (CO-Q10) 50 MG capsule Take 2 capsules (100 mg) by mouth daily 180 capsule 0     ezetimibe (ZETIA) 10 MG tablet Take 1 tablet (10 mg) by mouth daily 90 tablet 3     hydrochlorothiazide (HYDRODIURIL) 25 MG tablet Take 1 tablet (25 mg) by mouth daily 90 tablet 1     metFORMIN (GLUCOPHAGE) 500 MG tablet Take 2 tablets (1,000 mg) by mouth 2 times daily (with meals)       Multiple Minerals (CALCIUM-MAGNESIUM-ZINC) TABS Take 1 tablet by mouth daily       nystatin (MYCOSTATIN) 919614 UNIT/GM external powder Apply topically 4 times daily 60 g 1     Omega-3 Fatty Acids (OMEGA-3 FISH OIL PO) Take 1,200 mg by mouth daily       ONETOUCH ULTRA test strip TEST ONCE A  strip 3     potassium chloride ER (KLOR-CON) 10 MEQ CR tablet Take 1 tablet (10 mEq) by mouth daily 90 tablet 3            Allergies:     Allergies: Pseudoephedrine hcl, Simvastatin, Sulfa antibiotics, Chinese ink, Privet, and Short ragweed pollen ext          Past Medical History:     Past Medical History:   Diagnosis Date     Aneurysm of thoracic aorta (H24)      Arthritis 2019     Diabetes (H)      External thrombosed hemorrhoids      Hiatal hernia      Hypertension      Need for prophylactic vaccination and inoculation against unspecified single bacterial disease      Other chest pain      Thyroid disease 2022    Benign biopsy            Past Surgical History:     Past Surgical History:   Procedure Laterality Date     ABDOMINAL AORTIC ANEURYSM REPAIR       BACK SURGERY      laminectomy L5S1     BIOPSY  1971    Dermatitis herpetiformis     COLONOSCOPY       COLONOSCOPY N/A 10/14/2016    Procedure: COLONOSCOPY;  Surgeon: Daljit Salazar MD;  Location: [facility]     EXCISE LESION BACK N/A  "2023    Procedure: EXCISION OF MASSES ON BACK AND LEFT POSTERIOR ARM;  Surgeon: Bello Lentz MD;  Location: [facility]     EYE SURGERY  2007    Lasik     FINGER GANGLION CYST EXCISION       GENITOURINARY SURGERY      Vasectomy     L5 S1 Laminectomy  1991     ROTATOR CUFF REPAIR RT/LT  2006     SHOULDER SURGERY Right     replacement     TONSILLECTOMY  1971     VASCULAR SURGERY      Aortic aneurysm stent       ENT family history reviewed         Social History:     Social History     Tobacco Use     Smoking status: Former     Packs/day: 1.00     Years: 27.00     Additional pack years: 0.00     Total pack years: 27.00     Types: Cigarettes, Cigars, Pipe     Start date: 1961     Quit date: 3/26/1988     Years since quittin.9     Passive exposure: Past     Smokeless tobacco: Never   Vaping Use     Vaping Use: Never used   Substance Use Topics     Alcohol use: Yes     Comment: 3 beers daily      Drug use: Never     Comment: medical marijuana            Review of Systems:     ROS: See HPI         Physical Exam:     /74   Pulse 64   Temp (!) 96.3  F (35.7  C)   Ht 1.753 m (5' 9\")   Wt 95.3 kg (210 lb)   SpO2 95%   BMI 31.01 kg/m      General - The patient is well nourished and well developed, and appears to have good nutritional status.  Alert and oriented to person and place, answers questions and cooperates with examination appropriately.   Head and Face - Normocephalic and atraumatic, with no gross asymmetry noted.  The facial nerve is intact, with strong symmetric movements.  Voice and Breathing - The patient was breathing comfortably without the use of accessory muscles. There was no wheezing, stridor. The patients voice was clear and strong, and had appropriate pitch and quality.  Ears -the ears were examined under binocular microscopy and with otoscope.  The external ears appear grossly normal.  Bilateral canals are cerumen impacted.  The ears were cleaned with " #8, #7 René.  Bilateral canals are now clear, bilateral tympanic membranes are intact without effusion or retraction.  Eyes - Extraocular movements intact, sclera were not icteric or injected.  Mouth - Examination of the oral cavity showed pink, healthy oral mucosa. Dentition in good condition. No lesions or ulcerations noted. The tongue was mobile and midline.   Throat - The walls of the oropharynx were smooth, pink, moist, symmetric, and had no lesions or ulcerations.  The tonsillar pillars and soft palate were symmetric. The uvula was midline on elevation.    Neck - Normal range of motion, no worrisome palpable lymphadenopathy.  Palpation of the thyroid reveals small right thyroid nodule, left thyroid nodule is not palpable.  No concerning asymmetry or masses.  Nose - External contour is symmetric, no gross deflection or scars.  Nasal mucosa is pink and moist with no abnormal mucus.  The septum and turbinates were evaluated with nasal speculum, no polyps, masses, or purulence noted on examination of anterior nasal cavity.         Assessment and Plan:       ICD-10-CM    1. Bilateral impacted cerumen  H61.23       2. Thyroid nodule  E04.1 US Thyroid      3. Allergic reaction to grass pollen  J30.1         Repeat ultrasound of the thyroid.    Follow-up in 4 to 5 months for ear cleaning  Follow-up sooner with concerns or changes in symptoms    Shanice COCHRAN  [facility]      Again, thank you for allowing me to participate in the care of your patient.        Sincerely,        Shanice Robledo NP

## 2024-03-13 NOTE — PATIENT INSTRUCTIONS
Follow up in 4-5 months for ear cleaning  Follow up Ultrasound of the thyroid - they will call you to schedule      Thank you for allowing Shanice COCHRAN and our ENT team to participate in your care.  If your medications are too expensive, please call my nurse at the number listed below.  We can possibly change this medication.    If you have a scheduling or an appointment question please contact our Health Unit Coordinator at their direct line [phone] ext [phone]  ALL nursing questions or concerns can be directed to my Nurse Merlyn [phone].

## 2024-03-13 NOTE — PROGRESS NOTES
Otolaryngology Note         Chief Complaint:     Patient presents with:  Cerumen Impaction  Thyroid Problem: Recheck thyroid nodules           History of Present Illness:     Scott Akhtar is a 73 year old male seen today for routine ear cleaning.    Scott has a history of cerumen impaction, and is seen routinely every 4 to 6 months for ear cleaning.  He was last seen in ENT on 7/10/2023 for ear cleaning.  On exam both ears appeared healthy.    Today reports he has been feeling a bit plugged for the last month or so.  No acute changes in hearing.  No concerns for bothersome tinnitus, vertigo, facial numbness or weakness.  He denies otalgia.  He has been having some itching in his left ear worse than right.  No concerns for otorrhea.    He has a history of chronic cough, on exam and workup we noted a thyroid nodule.  Thyroid ultrasound completed 10/27/2022 showed a right greater than left thyroid nodule.  FNA completed of bilateral nodules on 11/10/2022 were both benign.  Follow-up ultrasound completed 4/10/2030 showed unchanged bilateral thyroid nodules.  Cough is now resolved.    Serum specific IgE inhalant panel completed 5/20/2022 showed moderate sensitivity to grass with a total IgE of 102.11.  He takes Claritin daily.         Medications:     Current Outpatient Rx   Medication Sig Dispense Refill    allopurinol (ZYLOPRIM) 100 MG tablet Take 1 tablet (100 mg) by mouth 2 times daily 180 tablet 3    ASPIRIN EC PO Take 81 mg by mouth daily      bisacodyl (DULCOLAX) 5 MG EC tablet Take 1 tablet (5 mg) by mouth daily as needed for constipation Take 2 tablets by mouth 2 days prior to procedure. Take the remaining 2 tablets by mouth at 3pm the day prior to procedure. 4 tablet 0    blood glucose (NO BRAND SPECIFIED) test strip 1 strip by In Vitro route daily Use to test blood sugar 1 times daily or as directed. 100 strip 3    Cholecalciferol (VITAMIN D3 PO) Take 5,000 Units by mouth daily 2000 to 5000      coenzyme  Q-10 (CO-Q10) 50 MG capsule Take 2 capsules (100 mg) by mouth daily 180 capsule 0    ezetimibe (ZETIA) 10 MG tablet Take 1 tablet (10 mg) by mouth daily 90 tablet 3    hydrochlorothiazide (HYDRODIURIL) 25 MG tablet Take 1 tablet (25 mg) by mouth daily 90 tablet 1    metFORMIN (GLUCOPHAGE) 500 MG tablet Take 2 tablets (1,000 mg) by mouth 2 times daily (with meals)      Multiple Minerals (CALCIUM-MAGNESIUM-ZINC) TABS Take 1 tablet by mouth daily      nystatin (MYCOSTATIN) 455891 UNIT/GM external powder Apply topically 4 times daily 60 g 1    Omega-3 Fatty Acids (OMEGA-3 FISH OIL PO) Take 1,200 mg by mouth daily      ONETOUCH ULTRA test strip TEST ONCE A  strip 3    potassium chloride ER (KLOR-CON) 10 MEQ CR tablet Take 1 tablet (10 mEq) by mouth daily 90 tablet 3            Allergies:     Allergies: Pseudoephedrine hcl, Simvastatin, Sulfa antibiotics, Chinese ink, Privet, and Short ragweed pollen ext          Past Medical History:     Past Medical History:   Diagnosis Date    Aneurysm of thoracic aorta (H24)     Arthritis 2019    Diabetes (H)     External thrombosed hemorrhoids     Hiatal hernia     Hypertension     Need for prophylactic vaccination and inoculation against unspecified single bacterial disease     Other chest pain     Thyroid disease 2022    Benign biopsy            Past Surgical History:     Past Surgical History:   Procedure Laterality Date    ABDOMINAL AORTIC ANEURYSM REPAIR      BACK SURGERY      laminectomy L5S1    BIOPSY  1971    Dermatitis herpetiformis    COLONOSCOPY      COLONOSCOPY N/A 10/14/2016    Procedure: COLONOSCOPY;  Surgeon: Daljit Salazar MD;  Location: [facility]    EXCISE LESION BACK N/A 01/16/2023    Procedure: EXCISION OF MASSES ON BACK AND LEFT POSTERIOR ARM;  Surgeon: Bello Lentz MD;  Location: [facility]    EYE SURGERY  2007    Lasik    FINGER GANGLION CYST EXCISION      GENITOURINARY SURGERY  1990    Vasectomy    L5 S1 Laminectomy  01/01/1991    ROTATOR CUFF  "REPAIR RT/LT  2006    SHOULDER SURGERY Right     replacement    TONSILLECTOMY  1971    VASCULAR SURGERY      Aortic aneurysm stent       ENT family history reviewed         Social History:     Social History     Tobacco Use    Smoking status: Former     Packs/day: 1.00     Years: 27.00     Additional pack years: 0.00     Total pack years: 27.00     Types: Cigarettes, Cigars, Pipe     Start date: 1961     Quit date: 3/26/1988     Years since quittin.9     Passive exposure: Past    Smokeless tobacco: Never   Vaping Use    Vaping Use: Never used   Substance Use Topics    Alcohol use: Yes     Comment: 3 beers daily     Drug use: Never     Comment: medical marijuana            Review of Systems:     ROS: See HPI         Physical Exam:     /74   Pulse 64   Temp (!) 96.3  F (35.7  C)   Ht 1.753 m (5' 9\")   Wt 95.3 kg (210 lb)   SpO2 95%   BMI 31.01 kg/m      General - The patient is well nourished and well developed, and appears to have good nutritional status.  Alert and oriented to person and place, answers questions and cooperates with examination appropriately.   Head and Face - Normocephalic and atraumatic, with no gross asymmetry noted.  The facial nerve is intact, with strong symmetric movements.  Voice and Breathing - The patient was breathing comfortably without the use of accessory muscles. There was no wheezing, stridor. The patients voice was clear and strong, and had appropriate pitch and quality.  Ears -the ears were examined under binocular microscopy and with otoscope.  The external ears appear grossly normal.  Bilateral canals are cerumen impacted.  The ears were cleaned with #8, #7 [place].  Bilateral canals are now clear, bilateral tympanic membranes are intact without effusion or retraction.  Eyes - Extraocular movements intact, sclera were not icteric or injected.  Mouth - Examination of the oral cavity showed pink, healthy oral mucosa. Dentition in good condition. No " lesions or ulcerations noted. The tongue was mobile and midline.   Throat - The walls of the oropharynx were smooth, pink, moist, symmetric, and had no lesions or ulcerations.  The tonsillar pillars and soft palate were symmetric. The uvula was midline on elevation.    Neck - Normal range of motion, no worrisome palpable lymphadenopathy.  Palpation of the thyroid reveals small right thyroid nodule, left thyroid nodule is not palpable.  No concerning asymmetry or masses.  Nose - External contour is symmetric, no gross deflection or scars.  Nasal mucosa is pink and moist with no abnormal mucus.  The septum and turbinates were evaluated with nasal speculum, no polyps, masses, or purulence noted on examination of anterior nasal cavity.         Assessment and Plan:       ICD-10-CM    1. Bilateral impacted cerumen  H61.23       2. Thyroid nodule  E04.1 US Thyroid      3. Allergic reaction to grass pollen  J30.1         Repeat ultrasound of the thyroid.    Follow-up in 4 to 5 months for ear cleaning  Follow-up sooner with concerns or changes in symptoms    Shanice COCHRAN  [facility]

## 2024-04-08 ENCOUNTER — ANESTHESIA EVENT (OUTPATIENT)
Dept: SURGERY | Facility: HOSPITAL | Age: 74
End: 2024-04-08
Payer: MEDICARE

## 2024-04-08 RX ORDER — LIDOCAINE 40 MG/G
CREAM TOPICAL
Status: CANCELLED | OUTPATIENT
Start: 2024-04-08

## 2024-04-08 RX ORDER — NALOXONE HYDROCHLORIDE 0.4 MG/ML
0.1 INJECTION, SOLUTION INTRAMUSCULAR; INTRAVENOUS; SUBCUTANEOUS
Status: CANCELLED | OUTPATIENT
Start: 2024-04-08

## 2024-04-08 RX ORDER — ONDANSETRON 4 MG/1
4 TABLET, ORALLY DISINTEGRATING ORAL EVERY 30 MIN PRN
Status: CANCELLED | OUTPATIENT
Start: 2024-04-08

## 2024-04-08 RX ORDER — SODIUM CHLORIDE, SODIUM LACTATE, POTASSIUM CHLORIDE, CALCIUM CHLORIDE 600; 310; 30; 20 MG/100ML; MG/100ML; MG/100ML; MG/100ML
INJECTION, SOLUTION INTRAVENOUS CONTINUOUS
Status: CANCELLED | OUTPATIENT
Start: 2024-04-08

## 2024-04-08 RX ORDER — ONDANSETRON 2 MG/ML
4 INJECTION INTRAMUSCULAR; INTRAVENOUS EVERY 30 MIN PRN
Status: CANCELLED | OUTPATIENT
Start: 2024-04-08

## 2024-04-08 ASSESSMENT — LIFESTYLE VARIABLES: TOBACCO_USE: 1

## 2024-04-08 NOTE — ANESTHESIA PREPROCEDURE EVALUATION
Anesthesia Pre-Procedure Evaluation    Patient: Scott Akhtar   MRN: [id number] : 1950        Procedure : Procedure(s):  Upper endoscopy and colonoscopy          Past Medical History:   Diagnosis Date     Aneurysm of thoracic aorta (H24)      Arthritis 2019     Diabetes (H)      External thrombosed hemorrhoids      Hiatal hernia      Hypertension      Need for prophylactic vaccination and inoculation against unspecified single bacterial disease      Other chest pain      Thyroid disease     Benign biopsy      Past Surgical History:   Procedure Laterality Date     ABDOMINAL AORTIC ANEURYSM REPAIR       BACK SURGERY      laminectomy L5S1     BIOPSY      Dermatitis herpetiformis     COLONOSCOPY       COLONOSCOPY N/A 10/14/2016    Procedure: COLONOSCOPY;  Surgeon: Daljit Salazar MD;  Location: [facility]     EXCISE LESION BACK N/A 2023    Procedure: EXCISION OF MASSES ON BACK AND LEFT POSTERIOR ARM;  Surgeon: Bello Lentz MD;  Location: [facility]     EYE SURGERY      Lasik     FINGER GANGLION CYST EXCISION       GENITOURINARY SURGERY      Vasectomy     L5 S1 Laminectomy  1991     ROTATOR CUFF REPAIR RT/LT  2006     SHOULDER SURGERY Right     replacement     TONSILLECTOMY  1971     VASCULAR SURGERY      Aortic aneurysm stent      Allergies   Allergen Reactions     Pseudoephedrine Hcl      Sudafed      Simvastatin GI Disturbance     Sulfa Antibiotics Nausea     Chinese Ink Other (See Comments)     Privet Other (See Comments)     Any Ink at all causes him to sneeze uncontrollable.      Short Ragweed Pollen Ext Cough, Itching, Rash and Difficulty breathing      Social History     Tobacco Use     Smoking status: Former     Packs/day: 1.00     Years: 27.00     Additional pack years: 0.00     Total pack years: 27.00     Types: Cigarettes, Cigars, Pipe     Start date: 1961     Quit date: 3/26/1988     Years since quittin.0     Passive exposure: Past      Smokeless tobacco: Never   Substance Use Topics     Alcohol use: Yes     Comment: 3 beers daily       Wt Readings from Last 1 Encounters:   03/13/24 95.3 kg (210 lb)        Anesthesia Evaluation   Pt has had prior anesthetic. Type: General and MAC.    History of anesthetic complications  - .  has been agressive waking up from [place]-pulled own ETT out after [facility] years ago.    ROS/MED HX  ENT/Pulmonary:     (+)     RAYSA risk factors,  hypertension,         tobacco use, Past use,                       Neurologic: Comment: Sensorineural hearing loss       Cardiovascular: Comment: Aneurysm of thoracic aorta  Endovascular AAA repair with stent graft    (+) Dyslipidemia hypertension- Peripheral Vascular Disease-   -  - -                                 Previous cardiac testing   Echo: Date: 7/21/22 Results:  Interpretation Summary  Global and regional left ventricular function is normal with an EF of 55-60%.  Borderline right ventricular enlargement.  Both atria appear normal.  Trace tricuspid insufficiency is present.  Ascending aorta 4.04 cm.  Stress Test:  Date: 7/22 Results:   The nuclear stress test is abnormal. There is a small area of a   moderate degree of infarction in the apical and inferolateral segment(s)   of the left ventricle. The left ventricular ejection fraction at rest is   62%.  The left ventricular ejection fraction at stress is 52%.      The patient's exercise capacity is average.      A prior study was conducted on 10/30/2017.     Apical lateral fixed abnormality consistent with previous infarct.     Abnormally low left ventricular ejection fraction on the stress   ECG Reviewed:  Date: 10/24/22 Results:  NSR @69  Inferior infarct, anterior infarct  Cath:  Date: Results:      METS/Exercise Tolerance: >4 METS    Hematologic:  - neg hematologic  ROS     Musculoskeletal: Comment: Infected cyst of skin  L5 S1 Laminectomy  Muscle weakness  Degenerative scoliosis   (+)  arthritis (Gout),            "  GI/Hepatic: Comment: External thrombosed hemorrhoids  Diaphragmatic hernia     (+) GERD, Asymptomatic on medication,    hiatal hernia,  bowel prep,     liver disease (Hepatic steatosis),       Renal/Genitourinary:  - neg Renal ROS     Endo:     (+) type I DM, type II DM, Last HgA1c: 6.5, date: 9/8/22, Not using insulin, - not using insulin pump.    thyroid problem,     Obesity,       Psychiatric/Substance Use: Comment: Alcohol use    (+)     Recreational drug usage: Cannabis (2 days ago).    Infectious Disease:  - neg infectious disease ROS     Malignancy:  - neg malignancy ROS     Other: Comment: Subacute cutaneous lupus erythematosus     (+)  , H/O Chronic Pain (lumbar radiculopathy),         Physical Exam    Airway      Comment: Large neck roll    Mallampati: III   TM distance: > 3 FB   Neck ROM: full   Mouth opening: > 3 cm    Respiratory Devices and Support         Dental       (+) Minor Abnormalities - some fillings, tiny chips      Cardiovascular   cardiovascular exam normal       Rhythm and rate: regular and normal     Pulmonary   pulmonary exam normal        breath sounds clear to auscultation       OUTSIDE LABS:  CBC:   Lab Results   Component Value Date    WBC 6.6 01/10/2023    WBC 5.9 06/07/2022    HGB 15.4 01/10/2023    HGB 14.9 06/07/2022    HCT 47.4 01/10/2023    HCT 46.2 06/07/2022     01/10/2023     06/07/2022     BMP:   Lab Results   Component Value Date     09/08/2023     03/08/2023    POTASSIUM 4.0 09/08/2023    POTASSIUM 4.0 03/08/2023    CHLORIDE 98 09/08/2023    CHLORIDE 97 (L) 03/08/2023    CO2 27 09/08/2023    CO2 30 (H) 03/08/2023    BUN 17.2 09/08/2023    BUN 13.9 03/08/2023    CR 0.92 09/08/2023    CR 0.93 03/08/2023     (H) 09/08/2023     (H) 03/08/2023     COAGS: No results found for: \"PTT\", \"INR\", \"FIBR\"  POC:   Lab Results   Component Value Date     (H) 03/12/2019     HEPATIC:   Lab Results   Component Value Date    ALBUMIN 4.8 " "09/08/2023    PROTTOTAL 8.3 09/08/2023    ALT 47 09/08/2023    AST 41 09/08/2023    ALKPHOS 63 09/08/2023    BILITOTAL 0.4 09/08/2023     OTHER:   Lab Results   Component Value Date    A1C 6.9 (H) 09/08/2023    SARAH 9.9 09/08/2023    TSH 1.49 06/07/2022       Anesthesia Plan    ASA Status:  3    NPO Status:  NPO Appropriate    Anesthesia Type: MAC.     - Reason for MAC: chronic cardiopulmonary disease, straight local not clinically adequate              Consents    Anesthesia Plan(s) and associated risks, benefits, and realistic alternatives discussed. Questions answered and patient/representative(s) expressed understanding.     - Discussed: Risks, Benefits and Alternatives for BOTH SEDATION and the PROCEDURE were discussed     - Discussed with:  Patient      - Extended Intubation/Ventilatory Support Discussed: No.      - Patient is DNR/DNI Status: No     Use of blood products discussed: No .     Postoperative Care            Comments:               LACHO Cheatham CRNA    I have reviewed the pertinent notes and labs in the chart from the past 30 days and (re)examined the patient.  Any updates or changes from those notes are reflected in this note.              # Obesity: Estimated body mass index is 31.01 kg/m  as calculated from the following:    Height as of 3/13/24: 1.753 m (5' 9\").    Weight as of 3/13/24: 95.3 kg (210 lb).      "

## 2024-04-12 RX ORDER — OXYCODONE HYDROCHLORIDE 5 MG/1
5 TABLET ORAL
Status: CANCELLED | OUTPATIENT
Start: 2024-04-12

## 2024-04-12 RX ORDER — ONDANSETRON 2 MG/ML
4 INJECTION INTRAMUSCULAR; INTRAVENOUS EVERY 30 MIN PRN
Status: CANCELLED | OUTPATIENT
Start: 2024-04-12

## 2024-04-12 RX ORDER — NALOXONE HYDROCHLORIDE 0.4 MG/ML
0.1 INJECTION, SOLUTION INTRAMUSCULAR; INTRAVENOUS; SUBCUTANEOUS
Status: CANCELLED | OUTPATIENT
Start: 2024-04-12

## 2024-04-12 RX ORDER — OXYCODONE HYDROCHLORIDE 5 MG/1
10 TABLET ORAL
Status: CANCELLED | OUTPATIENT
Start: 2024-04-12

## 2024-04-12 RX ORDER — ONDANSETRON 4 MG/1
4 TABLET, ORALLY DISINTEGRATING ORAL EVERY 30 MIN PRN
Status: CANCELLED | OUTPATIENT
Start: 2024-04-12

## 2024-04-15 ENCOUNTER — HOSPITAL ENCOUNTER (OUTPATIENT)
Facility: HOSPITAL | Age: 74
Discharge: HOME | End: 2024-04-15
Attending: SURGERY | Admitting: SURGERY
Payer: MEDICARE

## 2024-04-15 ENCOUNTER — ANESTHESIA (OUTPATIENT)
Dept: SURGERY | Facility: HOSPITAL | Age: 74
End: 2024-04-15
Payer: MEDICARE

## 2024-04-15 VITALS
TEMPERATURE: 98.7 F | HEIGHT: 69 IN | HEART RATE: 51 BPM | BODY MASS INDEX: 31.1 KG/M2 | OXYGEN SATURATION: 93 % | WEIGHT: 210 LBS | SYSTOLIC BLOOD PRESSURE: 131 MMHG | DIASTOLIC BLOOD PRESSURE: 85 MMHG | RESPIRATION RATE: 14 BRPM

## 2024-04-15 LAB — GLUCOSE BLDC GLUCOMTR-MCNC: 118 MG/DL (ref 70–99)

## 2024-04-15 PROCEDURE — 88305 TISSUE EXAM BY PATHOLOGIST: CPT | Mod: 26 | Performed by: PATHOLOGY

## 2024-04-15 PROCEDURE — 370N000017 HC ANESTHESIA TECHNICAL FEE, PER MIN: Performed by: SURGERY

## 2024-04-15 PROCEDURE — 710N000012 HC RECOVERY PHASE 2, PER MINUTE: Performed by: SURGERY

## 2024-04-15 PROCEDURE — 99100 ANES PT EXTEME AGE<1 YR&>70: CPT | Performed by: NURSE ANESTHETIST, CERTIFIED REGISTERED

## 2024-04-15 PROCEDURE — 250N000009 HC RX 250: Performed by: NURSE ANESTHETIST, CERTIFIED REGISTERED

## 2024-04-15 PROCEDURE — 82962 GLUCOSE BLOOD TEST: CPT

## 2024-04-15 PROCEDURE — 999N000141 HC STATISTIC PRE-PROCEDURE NURSING ASSESSMENT: Performed by: SURGERY

## 2024-04-15 PROCEDURE — 43239 EGD BIOPSY SINGLE/MULTIPLE: CPT | Performed by: NURSE ANESTHETIST, CERTIFIED REGISTERED

## 2024-04-15 PROCEDURE — 250N000009 HC RX 250: Performed by: SURGERY

## 2024-04-15 PROCEDURE — 250N000011 HC RX IP 250 OP 636: Performed by: NURSE ANESTHETIST, CERTIFIED REGISTERED

## 2024-04-15 PROCEDURE — 360N000075 HC SURGERY LEVEL 2, PER MIN: Performed by: SURGERY

## 2024-04-15 PROCEDURE — 272N000001 HC OR GENERAL SUPPLY STERILE: Performed by: SURGERY

## 2024-04-15 PROCEDURE — 88312 SPECIAL STAINS GROUP 1: CPT | Mod: 26 | Performed by: PATHOLOGY

## 2024-04-15 PROCEDURE — G0105 COLORECTAL SCRN; HI RISK IND: HCPCS | Performed by: SURGERY

## 2024-04-15 PROCEDURE — 88312 SPECIAL STAINS GROUP 1: CPT | Mod: TC | Performed by: SURGERY

## 2024-04-15 PROCEDURE — 43239 EGD BIOPSY SINGLE/MULTIPLE: CPT | Performed by: SURGERY

## 2024-04-15 PROCEDURE — 258N000003 HC RX IP 258 OP 636: Performed by: NURSE ANESTHETIST, CERTIFIED REGISTERED

## 2024-04-15 RX ORDER — LIDOCAINE 50 MG/G
OINTMENT TOPICAL
Status: DISCONTINUED
Start: 2024-04-15 | End: 2024-04-15 | Stop reason: HOSPADM

## 2024-04-15 RX ORDER — LIDOCAINE HYDROCHLORIDE 20 MG/ML
INJECTION, SOLUTION INFILTRATION; PERINEURAL PRN
Status: DISCONTINUED | OUTPATIENT
Start: 2024-04-15 | End: 2024-04-15

## 2024-04-15 RX ORDER — LIDOCAINE 50 MG/G
OINTMENT TOPICAL PRN
Status: DISCONTINUED | OUTPATIENT
Start: 2024-04-15 | End: 2024-04-15 | Stop reason: HOSPADM

## 2024-04-15 RX ORDER — PROPOFOL 10 MG/ML
INJECTION, EMULSION INTRAVENOUS CONTINUOUS PRN
Status: DISCONTINUED | OUTPATIENT
Start: 2024-04-15 | End: 2024-04-15

## 2024-04-15 RX ORDER — OXYCODONE HYDROCHLORIDE 5 MG/1
5 TABLET ORAL
Status: CANCELLED | OUTPATIENT
Start: 2024-04-15

## 2024-04-15 RX ORDER — LIDOCAINE HYDROCHLORIDE 20 MG/ML
JELLY TOPICAL
Status: DISCONTINUED
Start: 2024-04-15 | End: 2024-04-15 | Stop reason: WASHOUT

## 2024-04-15 RX ORDER — SODIUM CHLORIDE, SODIUM LACTATE, POTASSIUM CHLORIDE, CALCIUM CHLORIDE 600; 310; 30; 20 MG/100ML; MG/100ML; MG/100ML; MG/100ML
INJECTION, SOLUTION INTRAVENOUS CONTINUOUS
Status: DISCONTINUED | OUTPATIENT
Start: 2024-04-15 | End: 2024-04-15 | Stop reason: HOSPADM

## 2024-04-15 RX ORDER — PROPOFOL 10 MG/ML
INJECTION, EMULSION INTRAVENOUS PRN
Status: DISCONTINUED | OUTPATIENT
Start: 2024-04-15 | End: 2024-04-15

## 2024-04-15 RX ORDER — LIDOCAINE 40 MG/G
CREAM TOPICAL
Status: DISCONTINUED | OUTPATIENT
Start: 2024-04-15 | End: 2024-04-15 | Stop reason: HOSPADM

## 2024-04-15 RX ADMIN — PROPOFOL 50 MG: 10 INJECTION, EMULSION INTRAVENOUS at 11:06

## 2024-04-15 RX ADMIN — PROPOFOL 20 MG: 10 INJECTION, EMULSION INTRAVENOUS at 11:20

## 2024-04-15 RX ADMIN — SODIUM CHLORIDE, POTASSIUM CHLORIDE, SODIUM LACTATE AND CALCIUM CHLORIDE: 600; 310; 30; 20 INJECTION, SOLUTION INTRAVENOUS at 10:24

## 2024-04-15 RX ADMIN — PROPOFOL 90 MG: 10 INJECTION, EMULSION INTRAVENOUS at 11:03

## 2024-04-15 RX ADMIN — LIDOCAINE HYDROCHLORIDE 90 MG: 20 INJECTION, SOLUTION INFILTRATION; PERINEURAL at 11:02

## 2024-04-15 RX ADMIN — PROPOFOL 20 MG: 10 INJECTION, EMULSION INTRAVENOUS at 11:18

## 2024-04-15 RX ADMIN — PROPOFOL 20 MG: 10 INJECTION, EMULSION INTRAVENOUS at 11:14

## 2024-04-15 RX ADMIN — PROPOFOL 150 MCG/KG/MIN: 10 INJECTION, EMULSION INTRAVENOUS at 11:03

## 2024-04-15 ASSESSMENT — ACTIVITIES OF DAILY LIVING (ADL)
ADLS_ACUITY_SCORE: 18

## 2024-04-15 NOTE — OR NURSING
Patient and responsible adult given discharge instructions with no questions regarding instructions. Lucie score 19/20. Pain level 2/10.  Discharged from unit via ambulatory. Patient discharged to home with spouse and copy of AVS sent.

## 2024-04-15 NOTE — H&P
HISTORY AND PHYSICAL - ENDOSCOPY   4/15/2024    Patient : Scott Akhtar    Planned Procedures : Endoscopy    This is a 73 year old male with a need for an upper and lower endoscopy.  Upper endoscopy is needed for Gastroesohpageal Reflux and hiatal hernia, hiccups .  Lower endoscopy is needed for History of Colon Polyps and Change in Bowel Habits (harder to pass).      History of GERD : YES  History of PUD : NO  On PPI's : NO--prilosec caused stomach pain     Last colonoscopy : 2016  Family history of colon cancer : NO  Family history of colon polyps : NO  Personal history of colon cancer : NO  Personal history of colon polyps : YES  Rectal bleeding : NO  Changes in bowel habits :YES  Personal history of inflammatory bowel disease : NO    Past Medical History:  Past Medical History:   Diagnosis Date    Aneurysm of thoracic aorta (H24)     Arthritis 2019    Diabetes (H)     External thrombosed hemorrhoids     Hiatal hernia     Hypertension     Need for prophylactic vaccination and inoculation against unspecified single bacterial disease     Other chest pain     Thyroid disease 2022    Benign biopsy       Past Surgical History:  Past Surgical History:   Procedure Laterality Date    ABDOMINAL AORTIC ANEURYSM REPAIR      BACK SURGERY      laminectomy L5S1    BIOPSY  1971    Dermatitis herpetiformis    COLONOSCOPY      COLONOSCOPY N/A 10/14/2016    Procedure: COLONOSCOPY;  Surgeon: Daljit Salazar MD;  Location: [facility]    EXCISE LESION BACK N/A 01/16/2023    Procedure: EXCISION OF MASSES ON BACK AND LEFT POSTERIOR ARM;  Surgeon: Bello Lentz MD;  Location: [facility]    EYE SURGERY  2007    Lasik    FINGER GANGLION CYST EXCISION      GENITOURINARY SURGERY  1990    Vasectomy    L5 S1 Laminectomy  01/01/1991    ROTATOR CUFF REPAIR RT/LT  01/01/2006    SHOULDER SURGERY Right     replacement    TONSILLECTOMY  01/01/1971    VASCULAR SURGERY      Aortic aneurysm stent       Family History History:  Family History  "  Problem Relation Age of Onset    Depression Mother     Thyroid Disease Mother     Diabetes Father     Prostate Cancer Father     Hypertension Father     Breast Cancer No family hx of     Colon Cancer No family hx of        History of Tobacco Use:  History   Smoking Status    Former    Packs/day: 1.00    Years: 27.00    Types: Cigarettes, Cigars, Pipe    Start date: 6/1/1961    Quit date: 3/26/1988   Smokeless Tobacco    Never       Current Medications:  No current outpatient medications on file.       Allergies:  Allergies   Allergen Reactions    Pseudoephedrine Hcl      Sudafed     Simvastatin GI Disturbance    Sulfa Antibiotics Nausea    Chinese Ink Other (See Comments)    Privet Other (See Comments)     Any Ink at all causes him to sneeze uncontrollable.     Short Ragweed Pollen Ext Cough, Itching, Rash and Difficulty breathing       ROS:  Constitutional: negative  Eyes: negative  Ears, nose, mouth, throat, and face: positive for ear problems  Respiratory: negative  Cardiovascular: negative  Gastrointestinal: positive for reflux symptoms and hiatal hernia, colon polyp history, change in bowels  Genitourinary:negative  Integument/breast: positive for lupus  Hematologic/lymphatic: negative  Musculoskeletal:positive for back problems  Neurological: positive for headaches  Behavioral/Psych: positive for alcohol 2-3 times a week  Endocrine: positive for diabetes  Allergic/Immunologic: negative    PHYSICAL EXAM:     Vital signs: /99   Pulse 61   Temp 97  F (36.1  C) (Tympanic)   Resp 16   Ht 1.753 m (5' 9\")   Wt 95.3 kg (210 lb)   SpO2 93%   BMI 31.01 kg/m     BMI: Body mass index is 31.01 kg/m .   General: Normal, healthy, cooperative, in no acute distress, alert   Skin: no rashes   Lungs: clear to auscultation   CV: Regular rate and rhythm   Abdominal: non-distended, soft, non-tender to palpation   Extremities: No cyanosis, clubbing or edema noted bilaterally in Upper and Lower " Extremities   Neurological: without deficit    Assessment:   73 year old male with need for upper endoscopy for Gastroesohpageal Reflux and hiatal hernia, hiccups  and lower endoscopy for History of Colon Polyps and Change in Bowel Habits (harder to pass):    Plan:   Will proceed with an esophagogastroduodenoscopy and colonoscopy.      The risks, benefits, and alternatives to the planned procedure were fully discussed with the patient and/or the patient's representative(s). The risks of bleeding, infection, death, missing pathology, the need for additional procedures intra-operatively, the possible need for intra-operative consults, the possible need for transfusion therapy, cardiopulmonary compromise, the possible need for additional surgery for a complication were discussed with the patient and/or the patient's representative(s). The patient's and/or patient's representative(s) questions were addressed and answered. Informed consent was obtained from the patient and/or the patient's representative(s). The patient and/or the patient's representative(s) consent to proceed.    Specific risks:  Risks include but are not limited to bleeding, perforation, missing lesions, need for additional procedures, reaction to anesthesia.  All the patients questions were answered.  The patient consents to proceed.  The procedures will be scheduled.

## 2024-04-15 NOTE — ANESTHESIA CARE TRANSFER NOTE
Patient: Scott Akhtar    Procedure: Procedure(s):  Upper endoscopy with biopsy and colonoscopy       Diagnosis: GERD (gastroesophageal reflux disease) [K21.9]  Hiatal hernia [K44.9]  Hx of colonic polyp [Z86.010]  Change in stool [R19.5]  Diagnosis Additional Information: No value filed.    Anesthesia Type:   MAC     Note:    Oropharynx: oropharynx clear of all foreign objects and spontaneously breathing  Level of Consciousness: drowsy  Oxygen Supplementation: nasal cannula  Level of Supplemental Oxygen (L/min / FiO2): 2  Independent Airway: airway patency satisfactory and stable  Dentition: dentition unchanged  Vital Signs Stable: post-procedure vital signs reviewed and stable  Report to RN Given: handoff report given  Patient transferred to: Phase II    Handoff Report: Identifed the Patient, Identified the Reponsible Provider, Reviewed the pertinent medical history, Discussed the surgical course, Reviewed Intra-OP anesthesia mangement and issues during anesthesia, Set expectations for post-procedure period and Allowed opportunity for questions and acknowledgement of understanding  Vitals:  Vitals Value Taken Time   /67 04/15/24 1137   Temp     Pulse 49 04/15/24 1137   Resp     SpO2 95 % 04/15/24 1138   Vitals shown include unfiled device data.    Electronically Signed By: LACHO [place] CRNA  April 15, 2024  11:39 AM

## 2024-04-15 NOTE — DISCHARGE INSTRUCTIONS
IT IS RECOMMENDED YOU HAVE ANOTHER COLONOSCOPY IN 5 YEARS.     UPPER ENDOSCOPY    AFTER THE PROCEDURE  You will return to Same Day Surgery to rest for about an hour before you go home.  The doctor will talk with you and your family.  A family member/friend may visit with you.  You may burp up any air remaining in your stomach.  You may feel dizzy or light-headed from the medicine.  Your nurse will go over the discharge instructions with you and your caregiver and answer any of your questions.    You will be contacted the next day to check on how you are doing.  If biopsies were taken, you will be contacted with the results usually within 7-10 days.  BACK AT HOME  Rest for an hour or two after you get home.  When your throat is no longer numb and you have a gag reflex, take a few sips of cool water.  If you can swallow comfortably, you may start eating again.  You may have a mild sore throat for the rest of the day.  You will be contacted with results by the surgeons office within a week. If not contacted in one week, call the surgeons office.  WHAT TO WATCH FOR:  Problems rarely occur after the exam, but it is important to be aware of the early signs of a complication.  Call your doctor immediately if you have:  Difficulty swallowing or breathing  Unusual pain in your stomach or chest  Vomiting blood or dark material that looks like coffee grounds  Black or tarry stools  Temperature over 101.5 degrees    MORE QUESTIONS?  Please ask your doctor or nurse before the exam begins  or call your doctor at the clinic.    IF YOU MUST CANCEL YOUR PROCEDURE THE EVENING/NIGHT BEFORE, PLEASE CALL HOSPITAL ADMITTING -[phone] OR TOLL FREE [phone], [phone].    Phone Numbers:  Hospital - [phone] [phone]  [facility] - [phone]  Surgery Patient Education - [phone] or toll free [phone]    INSTRUCTIONS AFTER COLONOSCOPY    WHEN YOU ARE BACK HOME:  Plan to rest for an hour or two after  you get home.  You may have some cramping or pressure until you pass gas.  You may resume your regular medications.  Eat a small, light meal at first, and then gradually return to normal meal sizes.  You will be contacted the next day to see how you are doing.  If you had a polyp removed:  Slight bleeding may occur.  You may have a slight blood stain on the toilet paper after a bowel movement.  To lessen the chance of bleeding, avoid heavy exercise for ONE WEEK.  This includes heavy lifting, vigorous sport activities, and heavy physical labor.  You may resume your normal sexual activity.    Avoid aspirin or aspirin products if instructed by your doctor.  If there is a polyp or biopsy, you will be contacted with results within 7-10 days.     WHAT TO WATCH FOR:  Problems rarely occur after the exam; however, it is important for you to watch for early signs of possible problems.  If you have   Unusual pain in your abdomen  Nausea and vomiting that persists  Excessive bleeding  Black or bloody bowel movements  Fever or temperature above 100.6 F  Please call your doctor ([facility] [phone]) or go to the nearest hospital emergency room.    After Anesthesia (Sleep Medicine)  What should I do after anesthesia?  You should rest and relax for the next 24 hours. Avoid risky or difficult (strenuous) activity. A responsible adult should stay with you overnight.  Don't drive or use any heavy equipment for 24 hours. Even if you feel normal, your reactions may be affected by the sleep medicine given to you.  Don't drink alcohol or make any important decisions for 24 hours.  Slowly get back to your regular diet, as you feel able.  How should I expect to feel?  It's normal to feel dizzy, light-headed, or faint for up to a full day after anesthesia or while taking pain medicine. If this happens:   Sit down for a few minutes before standing.  Have someone help you when you get up to walk or use the bathroom.  If you have nausea  (feel sick to your stomach) or vomit (throw up):   Drink clear liquids (such as apple juice, ginger ale, broth, or 7UP) until you feel better.  If you feel sick to your stomach, or you keep vomiting for 24 hours, please call the doctor.  What else should I know?  You might have a dry mouth, sore throat, muscle aches, or trouble sleeping. These should go away after 24 hours.  Please contact your doctor if you have any other symptoms that concern you, such as fever, pain, bleeding, fluid drainage, swelling, or headache, or if it's been over 8 to 10 hours and you still aren't able to pee (urinate).  If you have a history of sleep apnea, it's very important to use your CPAP machine for the next 24 hours when you nap or sleep.   For informational purposes only. Not to replace the advice of your health care provider. Copyright   2023 [facility]. All rights reserved. Clinically reviewed by Raul Mccain MD. [employer] [id number] - REV 09/23.

## 2024-04-15 NOTE — OP NOTE
REPORT OF OPERATION  DATE OF PROCEDURE: 4/15/2024    PATIENT: Scott Akhtar    SURGERY PERFORMED: Esophagogastroduodenoscopy with biopsies and colonoscopy     PREOPERATIVE DIAGNOSIS:   Abdominal Pain and Gastroesohpageal Reflux   History of Colon Polyps    POSTOPERATIVE DIAGNOSIS:    Normal Esophagogastroduodenoscopy   Squamous columnar junction at 40   Normal colonoscopy   Diverticulosis was not identified.   Hemorrhoids  were  identified.    SURGEON: Bello Lentz MD    ASSISTANTS: None    ANESTHESIA: Monitored Anesthesia Care    COMPLICATIONS: None apparent    TRANSFUSIONS: None    TISSUE TO PATHOLOGY: Duodenal, Antral, and Distal Esophageal    FINDINGS:   Normal Esophagogastroduodenoscopy   Normal colonoscopy   Diverticulosis was identified.   Hemorrhoids  were  identified.    INDICATIONS: This is a 73 year old male in need of an Esophagogastroduodenoscopy for Abdominal Pain and Gastroesohpageal Reflux and a colonoscopy for History of Colon Polyps.  The patient will be taken to the endoscopy suite for those procedures.    DESCRIPTIONS OF PROCEDURE IN DETAIL: After consent was obtained the patient was taken to the operative suite and sera in the left lateral decubitus position.  The patient was identified and the correct patient was confirmed. Monitored Anesthesia Care was given by anesthesia. A time out was performed verifying the correct patient and the correct procedure.  The entire operative team was in agreement.  All necessary equipment and supplies were in the room.    The endoscope was inserted into the mouth and passed without difficulty to the third portion of the duodenum.  Duodenal biopsies were taken.  The endoscope was then withdrawn through the duodenum, the duodenal bulb and pyloric channel and no abnormalities were noted.  The endoscope was brought back into the stomach and antral biopsies were obtained.  The endoscope was then retroflexed and no lesions of the fundus body or antrum were  seen.  The endoscope was straightened back out and brought into the distal esophagus and a well-defined squamocolumnar junction was identified at 40 cm. Biopsies of the distal esophagus were taken.  The endoscope was slowly withdrawn through the remaining esophagus no other abnormalities are seen,  The endoscope was withdrawn from the patient and the patient was positioned for colonoscopy.    Rectal exam was performed and no lesions of the anal canal were noted.  The colonoscope was inserted into the anus and passed without difficulty to the cecum.  The cecum was identified by the ileocecal valve, the coalescence of the tinea and the appendiceal orifice.  Upon withdrawal all walls of the colon were visualized.  There were no polyps, masses or evidence of colitis seen.  Diverticulosis was seen.  Upon reaching the rectum the scope was retroflexed and internal hemorrhoids  were  seen.  The scope was straightened back out and removed from the patient.  The patient was then taken to the recovery room in stable condition tolerating the procedure well.      Prep: fair    Withdrawal time was 6 minutes.    It is recommended that the patient have another colonoscopy in 5 years.

## 2024-04-15 NOTE — ANESTHESIA POSTPROCEDURE EVALUATION
Patient: Scott Akhtar    Procedure: Procedure(s):  Upper endoscopy with biopsy and colonoscopy       Anesthesia Type:  MAC    Note:  Disposition: Outpatient   Postop Pain Control: Uneventful            Sign Out: Well controlled pain   PONV: No   Neuro/Psych: Uneventful            Sign Out: Acceptable/Baseline neuro status   Airway/Respiratory: Uneventful            Sign Out: Acceptable/Baseline resp. status   CV/Hemodynamics: Uneventful            Sign Out: Acceptable CV status; No obvious hypovolemia; No obvious fluid overload   Other NRE: NONE   DID A NON-ROUTINE EVENT OCCUR? No           Last vitals:  Vitals Value Taken Time   /85 04/15/24 1200   Temp 98.7  F (37.1  C) 04/15/24 1200   Pulse 51 04/15/24 1200   Resp 14 04/15/24 1134   SpO2 93 % 04/15/24 1208   Vitals shown include unfiled device data.    Electronically Signed By: LACHO GAMBOA CRNA  April 15, 2024  12:51 PM

## 2024-04-16 NOTE — PROGRESS NOTES
Assessment & Plan     (E11.9) Controlled type 2 diabetes mellitus without complication, without long-term current use of insulin (H)  (primary encounter diagnosis)  Plan: Hemoglobin A1c, Comprehensive metabolic panel (BMP + Alb, Alk Phos, ALT, AST, Total. Bili, TP)        A1c in process. Will notify patient of the results when available and intervene accordingly. On statin. Working on better blood pressure control. Eye exam UTD. Will see him back in 6 months, sooner if A1C poorly controlled.     (E11.69,  E78.5) Hyperlipidemia associated with type 2 diabetes mellitus (H)  Plan: Tolerating statin. Recent lipids controlled. Will update AST and ALT today. Will notify patient of the results when available and intervene accordingly.     (I10) Essential hypertension  Plan: losartan (COZAAR) 25 MG tablet        BP high at 136/76 as he does have diabetes. Will continue the hydrochlorothiazide, but add losartan 25 mg. Will avoid beta-blocker as HR in the upper 40s/low 50s. Will then see him back in 2-3 weeks for a recheck.    We did discuss that his hydrochlorothiazide may be the cause of this gout, but he would like to continue.     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     (R93.89) Abnormal carotid ultrasound  (R42) Dizziness and giddiness  Plan: US Carotid Bilateral        Patient notes that he had an abnormal carotid US in the past during a preventative screening. Will repeat today. Will notify patient of the results when available and intervene accordingly.     (R00.1) Bradycardia  Comment: HR upper 40s, low 50s which appears to be around his baseline  Plan: Patient feels well. Rare dizziness when he stands quickly. EKG without any block. Will continue to monitor. If he develops new symptoms, will send to cardiology.     (N52.9) Erectile dysfunction, unspecified  "erectile dysfunction type  Comment: Viagra and Cialis did not work, requesting RoSparks  Plan: Will discuss with pharmacist and get back to the patient.         BMI  Estimated body mass index is 32.99 kg/m  as calculated from the following:    Height as of 4/15/24: 1.753 m (5' 9\").    Weight as of this encounter: 101.3 kg (223 lb 6.4 oz).   Weight management plan: Discussed healthy diet and exercise guidelines          Frances Chavez is a 73 year old, presenting for the following health issues:  Diabetes and Lipids    Via the Health Maintenance questionnaire, the patient has reported the following services have been completed -Colonscopy, this information has been sent to the abstraction team.    History of Present Illness       Diabetes:   He presents for follow up of diabetes.  He is checking home blood glucose a few times a month.   He checks blood glucose before meals.  Blood glucose is never over 200 and never under 70. He is aware of hypoglycemia symptoms including shakiness, dizziness and weakness.    He has no concerns regarding his diabetes at this time.  He is having excessive thirst and blurry vision.            He eats 2-3 servings of fruits and vegetables daily.He consumes 0 sweetened beverage(s) daily.He exercises with enough effort to increase his heart rate 10 to 19 minutes per day.  He exercises with enough effort to increase his heart rate 3 or less days per week.   He is taking medications regularly.       Diabetes Follow-up    How often are you checking your blood sugar? A few times a month  What time of day are you checking your blood sugars (select all that apply)?   When patient starts to feel off   Have you had any blood sugars above 200?  No  Have you had any blood sugars below 70?  No  What symptoms do you notice when your blood sugar is low?  Shaky, Dizzy, and Weak when it hits about 120  What concerns do you have today about your diabetes? None   Do you have any of these symptoms? " (Select all that apply)  Blurry vision and excessive thirst   [facility]1C [facility] 6.6 [facility] 12[facility]19/23[facility]. Taking Metformin 1000 mg ER BID without side effects.   Denies chest pain, shortness of breath, syncope, or palpitations. Rare dizziness.     He did have a carotid US done in the 90s through a preventative screening. He notes that one of his carotids were partially blocked. Would like repeat imaging. Rare dizziness.     Hyperlipidemia Follow-Up    Are you regularly taking any medication or supplement to lower your cholesterol?   Yes- Zetia, does not tolerate statins  Are you having muscle aches or other side effects that you think could be caused by your cholesterol lowering medication?  No; stopped when the statin was stopped  Denies chest pain, shortness of breath, syncope, or palpitations. Rare dizziness.   Does not tolerate statins-has severe muscle aches.     He does suffer from ED. He has tried Viagra and Cialis, neither worked. Requesting prescription for Ro-Gonzalez.     Hypertension Follow-up    Do you check your blood pressure regularly outside of the clinic? No   Are you following a low salt diet? No  Are your blood pressures ever more than 140 on the top number (systolic) OR more   than 90 on the bottom number (diastolic), for example 140/90? Patient does not check BP outside of the clinic   Taking hydrochlorothiazide without side effects. Also on potassium supplements.   Does have a h/o gout. On allopurinol. No recent flares.      He does have cutaneous lupus. Has not recently seen dermatology. Gets worse during the summer months. Declines to see derm at this time.       BP Readings from Last 2 Encounters:   04/17/24 136/76   04/15/24 131/85     Hemoglobin A1C (%)   Date Value   09/08/2023 6.9 (H)   03/08/2023 6.8 (H)     LDL Cholesterol Calculated (mg/dL)   Date Value   09/08/2023 71   09/08/2022 63     How many servings of fruits and vegetables do you eat daily?  2-3  On average, how many sweetened  beverages do you drink each day (Examples: soda, juice, sweet tea, etc.  Do NOT count diet or artificially sweetened beverages)?   0  How many days per week do you exercise enough to make your heart beat faster? 3 or less  How many minutes a day do you exercise enough to make your heart beat faster? 10 - 19  How many days per week do you miss taking your medication? 0        Review of Systems  Constitutional, HEENT, cardiovascular, pulmonary, gi and gu systems are negative, except as otherwise noted.      Objective    /76 (BP Location: Right arm, Patient Position: Sitting, Cuff Size: Adult Large)   Pulse 52   Temp 96.9  F (36.1  C) (Tympanic)   Resp 16   Wt 101.3 kg (223 lb 6.4 oz)   SpO2 96%   BMI 32.99 kg/m    Body mass index is 32.99 kg/m .  Physical Exam   GENERAL: alert, no distress, and over weight  EYES: Eyes grossly normal to inspection, PERRL and conjunctivae and sclerae normal  HENT: ear canals and TM's normal, nose and mouth without ulcers or lesions  NECK: no adenopathy, no asymmetry, masses, or scars  RESP: lungs clear to auscultation - no rales, rhonchi or wheezes  CV: regular rhythm, bradycardic, no murmur, click or rub, no peripheral edema  ABDOMEN: soft, nontender, no hepatosplenomegaly, no masses and bowel sounds normal  MS: no gross musculoskeletal defects noted, no edema  NEURO: Normal strength and tone, mentation intact and speech normal  PSYCH: mentation appears normal, affect normal/bright    Labs in process      EKG; sinus bradycardia, otherwise unchanged from previous EKG, no acute ST chnages    Signed Electronically by: Usha Gray NP

## 2024-04-17 ENCOUNTER — HOSPITAL ENCOUNTER (OUTPATIENT)
Dept: ULTRASOUND IMAGING | Facility: HOSPITAL | Age: 74
Discharge: HOME | End: 2024-04-17
Attending: NURSE PRACTITIONER
Payer: MEDICARE

## 2024-04-17 ENCOUNTER — OFFICE VISIT (OUTPATIENT)
Dept: FAMILY MEDICINE | Facility: OTHER | Age: 74
End: 2024-04-17
Attending: NURSE PRACTITIONER
Payer: MEDICARE

## 2024-04-17 VITALS
OXYGEN SATURATION: 96 % | TEMPERATURE: 96.9 F | WEIGHT: 223.4 LBS | SYSTOLIC BLOOD PRESSURE: 136 MMHG | DIASTOLIC BLOOD PRESSURE: 76 MMHG | RESPIRATION RATE: 16 BRPM | BODY MASS INDEX: 32.99 KG/M2 | HEART RATE: 52 BPM

## 2024-04-17 DIAGNOSIS — E11.69 HYPERLIPIDEMIA ASSOCIATED WITH TYPE 2 DIABETES MELLITUS (H): ICD-10-CM

## 2024-04-17 DIAGNOSIS — N52.9 ERECTILE DYSFUNCTION, UNSPECIFIED ERECTILE DYSFUNCTION TYPE: ICD-10-CM

## 2024-04-17 DIAGNOSIS — E78.5 HYPERLIPIDEMIA ASSOCIATED WITH TYPE 2 DIABETES MELLITUS (H): ICD-10-CM

## 2024-04-17 DIAGNOSIS — L93.1 SUBACUTE CUTANEOUS LUPUS ERYTHEMATOSUS: ICD-10-CM

## 2024-04-17 DIAGNOSIS — R00.1 BRADYCARDIA: ICD-10-CM

## 2024-04-17 DIAGNOSIS — N52.9 ERECTILE DYSFUNCTION, UNSPECIFIED ERECTILE DYSFUNCTION TYPE: Primary | ICD-10-CM

## 2024-04-17 DIAGNOSIS — R42 DIZZINESS AND GIDDINESS: ICD-10-CM

## 2024-04-17 DIAGNOSIS — I10 ESSENTIAL HYPERTENSION: ICD-10-CM

## 2024-04-17 DIAGNOSIS — E04.1 THYROID NODULE: ICD-10-CM

## 2024-04-17 DIAGNOSIS — E11.9 CONTROLLED TYPE 2 DIABETES MELLITUS WITHOUT COMPLICATION, WITHOUT LONG-TERM CURRENT USE OF INSULIN (H): Primary | ICD-10-CM

## 2024-04-17 DIAGNOSIS — R93.89 ABNORMAL CAROTID ULTRASOUND: ICD-10-CM

## 2024-04-17 LAB
ALBUMIN SERPL BCG-MCNC: 4.3 G/DL (ref 3.5–5.2)
ALP SERPL-CCNC: 60 U/L (ref 40–150)
ALT SERPL W P-5'-P-CCNC: 41 U/L (ref 0–70)
ANION GAP SERPL CALCULATED.3IONS-SCNC: 8 MMOL/L (ref 7–15)
AST SERPL W P-5'-P-CCNC: 43 U/L (ref 0–45)
ATRIAL RATE - MUSE: 48 BPM
BILIRUB SERPL-MCNC: 0.4 MG/DL
BUN SERPL-MCNC: 14.8 MG/DL (ref 8–23)
CALCIUM SERPL-MCNC: 9.1 MG/DL (ref 8.8–10.2)
CHLORIDE SERPL-SCNC: 103 MMOL/L (ref 98–107)
CREAT SERPL-MCNC: 0.86 MG/DL (ref 0.67–1.17)
DEPRECATED HCO3 PLAS-SCNC: 30 MMOL/L (ref 22–29)
DIASTOLIC BLOOD PRESSURE - MUSE: NORMAL MMHG
EGFRCR SERPLBLD CKD-EPI 2021: >90 ML/MIN/1.73M2
EST. AVERAGE GLUCOSE BLD GHB EST-MCNC: 140 MG/DL
GLUCOSE SERPL-MCNC: 102 MG/DL (ref 70–99)
HBA1C MFR BLD: 6.5 %
INTERPRETATION ECG - MUSE: NORMAL
P AXIS - MUSE: 27 DEGREES
PATH REPORT.COMMENTS IMP SPEC: NORMAL
PATH REPORT.COMMENTS IMP SPEC: NORMAL
PATH REPORT.FINAL DX SPEC: NORMAL
PATH REPORT.GROSS SPEC: NORMAL
PATH REPORT.MICROSCOPIC SPEC OTHER STN: NORMAL
PATH REPORT.MICROSCOPIC SPEC OTHER STN: NORMAL
PATH REPORT.RELEVANT HX SPEC: NORMAL
PHOTO IMAGE: NORMAL
POTASSIUM SERPL-SCNC: 4 MMOL/L (ref 3.4–5.3)
PR INTERVAL - MUSE: 166 MS
PROT SERPL-MCNC: 7.3 G/DL (ref 6.4–8.3)
QRS DURATION - MUSE: 96 MS
QT - MUSE: 424 MS
QTC - MUSE: 378 MS
R AXIS - MUSE: -11 DEGREES
SODIUM SERPL-SCNC: 141 MMOL/L (ref 135–145)
SYSTOLIC BLOOD PRESSURE - MUSE: NORMAL MMHG
T AXIS - MUSE: -2 DEGREES
TSH SERPL DL<=0.005 MIU/L-ACNC: 2.65 UIU/ML (ref 0.3–4.2)
VENTRICULAR RATE- MUSE: 48 BPM

## 2024-04-17 PROCEDURE — 36415 COLL VENOUS BLD VENIPUNCTURE: CPT | Mod: ZL | Performed by: NURSE PRACTITIONER

## 2024-04-17 PROCEDURE — 83036 HEMOGLOBIN GLYCOSYLATED A1C: CPT | Mod: ZL | Performed by: NURSE PRACTITIONER

## 2024-04-17 PROCEDURE — 84443 ASSAY THYROID STIM HORMONE: CPT | Mod: ZL | Performed by: NURSE PRACTITIONER

## 2024-04-17 PROCEDURE — 93880 EXTRACRANIAL BILAT STUDY: CPT

## 2024-04-17 PROCEDURE — 99214 OFFICE O/P EST MOD 30 MIN: CPT | Performed by: NURSE PRACTITIONER

## 2024-04-17 PROCEDURE — 84155 ASSAY OF PROTEIN SERUM: CPT | Mod: ZL | Performed by: NURSE PRACTITIONER

## 2024-04-17 PROCEDURE — 76536 US EXAM OF HEAD AND NECK: CPT

## 2024-04-17 PROCEDURE — G0463 HOSPITAL OUTPT CLINIC VISIT: HCPCS

## 2024-04-17 PROCEDURE — 93010 ELECTROCARDIOGRAM REPORT: CPT | Mod: 77 | Performed by: INTERNAL MEDICINE

## 2024-04-17 PROCEDURE — 93005 ELECTROCARDIOGRAM TRACING: CPT | Performed by: NURSE PRACTITIONER

## 2024-04-17 PROCEDURE — 82374 ASSAY BLOOD CARBON DIOXIDE: CPT | Mod: ZL | Performed by: NURSE PRACTITIONER

## 2024-04-17 RX ORDER — LOSARTAN POTASSIUM 25 MG/1
25 TABLET ORAL DAILY
Qty: 90 TABLET | Refills: 0 | Status: SHIPPED | OUTPATIENT
Start: 2024-04-17 | End: 2024-05-01

## 2024-04-17 RX ORDER — SILDENAFIL 100 MG/1
100 TABLET, FILM COATED ORAL DAILY PRN
Qty: 12 TABLET | Refills: 1 | Status: SHIPPED | OUTPATIENT
Start: 2024-04-17

## 2024-04-17 ASSESSMENT — PAIN SCALES - GENERAL: PAINLEVEL: NO PAIN (0)

## 2024-04-17 NOTE — TELEPHONE ENCOUNTER
Pended medication for patient, was unsure of amount you wanted to pend, I pended 12.  Please check and sign.  Tr Vivar LPN

## 2024-04-23 ENCOUNTER — PREP FOR PROCEDURE (OUTPATIENT)
Dept: SURGERY | Facility: OTHER | Age: 74
End: 2024-04-23

## 2024-04-23 ENCOUNTER — OFFICE VISIT (OUTPATIENT)
Dept: SURGERY | Facility: OTHER | Age: 74
End: 2024-04-23
Attending: SURGERY
Payer: COMMERCIAL

## 2024-04-23 VITALS
OXYGEN SATURATION: 96 % | RESPIRATION RATE: 16 BRPM | HEART RATE: 66 BPM | BODY MASS INDEX: 31.1 KG/M2 | TEMPERATURE: 96.8 F | SYSTOLIC BLOOD PRESSURE: 130 MMHG | DIASTOLIC BLOOD PRESSURE: 72 MMHG | HEIGHT: 69 IN | WEIGHT: 210 LBS

## 2024-04-23 DIAGNOSIS — K90.0 CELIAC DISEASE: Primary | ICD-10-CM

## 2024-04-23 DIAGNOSIS — K90.0 CELIAC DISEASE: ICD-10-CM

## 2024-04-23 DIAGNOSIS — K21.00 GASTROESOPHAGEAL REFLUX DISEASE WITH ESOPHAGITIS, UNSPECIFIED WHETHER HEMORRHAGE: Primary | ICD-10-CM

## 2024-04-23 PROCEDURE — 99213 OFFICE O/P EST LOW 20 MIN: CPT | Performed by: SURGERY

## 2024-04-23 PROCEDURE — 36415 COLL VENOUS BLD VENIPUNCTURE: CPT | Mod: ZL | Performed by: SURGERY

## 2024-04-23 PROCEDURE — G0463 HOSPITAL OUTPT CLINIC VISIT: HCPCS

## 2024-04-23 PROCEDURE — 86364 TISS TRNSGLTMNASE EA IG CLAS: CPT | Mod: ZL | Performed by: SURGERY

## 2024-04-23 RX ORDER — OMEPRAZOLE 40 MG/1
40 CAPSULE, DELAYED RELEASE ORAL 2 TIMES DAILY
Qty: 60 CAPSULE | Refills: 3 | Status: SHIPPED | OUTPATIENT
Start: 2024-04-23 | End: 2024-05-16

## 2024-04-23 ASSESSMENT — PAIN SCALES - GENERAL: PAINLEVEL: NO PAIN (0)

## 2024-04-23 NOTE — PATIENT INSTRUCTIONS
Thank you for allowing Dr. Lentz and our surgical team to participate in your care. Please call our health unit coordinator at [phone] with scheduling questions or the nurse at [phone] with any other questions or concerns.    You have been scheduled for: UPPER ENDOSCOPY with  on JUNE 27TH.       SURGERY EDUCATION NURSE TO CALL ONE WEEK PRIOR TO PROCEDURE, IF YOU DO NOT HEAR FROM THEM YOU CAN CALL  [phone]    Please see handout for additional instruction.    You will not need a pre-operative appointment with your primary care provider.  You may call [phone] or [phone] with any questions.

## 2024-04-23 NOTE — PROGRESS NOTES
CLINIC NOTE - POST-OP ENDOSCOPY  4/23/2024    Patient:Scott Akhtar    Procedure: Esophagogastroduodenoscopy with biopsy and Colonoscopy    This is a 73 year old male who is 2 weeks s/p Esophagogastroduodenoscopy with biopsy and Colonoscopy.  At the time of endoscopy a very inflamed and denuded duodenal bulb and duodenum were noted.  This was all consistent with celiac disease.     Current Medications:  Current Outpatient Medications   Medication Sig Dispense Refill    allopurinol (ZYLOPRIM) 100 MG tablet Take 1 tablet (100 mg) by mouth 2 times daily 180 tablet 3    ASPIRIN EC PO Take 81 mg by mouth daily      blood glucose (NO BRAND SPECIFIED) test strip 1 strip by In Vitro route daily Use to test blood sugar 1 times daily or as directed. 100 strip 3    Cholecalciferol (VITAMIN D3 PO) Take 5,000 Units by mouth daily 2000 to 5000      coenzyme Q-10 (CO-Q10) 50 MG capsule Take 2 capsules (100 mg) by mouth daily 180 capsule 0    ezetimibe (ZETIA) 10 MG tablet Take 1 tablet (10 mg) by mouth daily 90 tablet 3    hydrochlorothiazide (HYDRODIURIL) 25 MG tablet Take 1 tablet (25 mg) by mouth daily 90 tablet 1    losartan (COZAAR) 25 MG tablet Take 1 tablet (25 mg) by mouth daily 90 tablet 0    metFORMIN (GLUCOPHAGE) 500 MG tablet Take 2 tablets (1,000 mg) by mouth 2 times daily (with meals)      Multiple Minerals (CALCIUM-MAGNESIUM-ZINC) TABS Take 1 tablet by mouth daily      nystatin (MYCOSTATIN) 708236 UNIT/GM external powder Apply topically 4 times daily 60 g 1    Omega-3 Fatty Acids (OMEGA-3 FISH OIL PO) Take 1,200 mg by mouth daily      omeprazole (PRILOSEC) 40 MG DR capsule Take 1 capsule (40 mg) by mouth 2 times daily 60 capsule 3    ONETOUCH ULTRA test strip TEST ONCE A  strip 3    potassium chloride ER (KLOR-CON) 10 MEQ CR tablet Take 1 tablet (10 mEq) by mouth daily 90 tablet 3    sildenafil (VIAGRA) 100 MG tablet Take 1 tablet (100 mg) by mouth daily as needed 12 tablet 1       Allergies:  Allergies  "  Allergen Reactions    Pseudoephedrine Hcl      Sudafed     Simvastatin GI Disturbance    Sulfa Antibiotics Nausea    Chinese Ink Other (See Comments)    Privet Other (See Comments)     Any Ink at all causes him to sneeze uncontrollable.     Short Ragweed Pollen Ext Cough, Itching, Rash and Difficulty breathing       PHYSICAL EXAM:   Vital signs: /72 (BP Location: Right arm, Cuff Size: Adult Large)   Pulse 66   Temp 96.8  F (36  C) (Tympanic)   Resp 16   Ht 1.753 m (5' 9\")   Wt 95.3 kg (210 lb)   SpO2 96%   BMI 31.01 kg/m     Weight: [unfilled]   BMI: Body mass index is 31.01 kg/m .   General: Normal, healthy, cooperative, in no acute distress, alert     PATHOLOGY:  Case Report   Date Value Ref Range Status   04/15/2024   Final    Surgical Pathology Report                         Case: [id number]                                  Authorizing Provider:  Bello Lentz MD  Collected:           04/15/2024 11:07 AM          Ordering Location:     [facility] Main Operating Room     Received:            04/15/2024 02:26 PM          Pathologist:           Julien Webster DO                                                         Specimens:   A) - Small Intestine, Duodenum                                                                      B) - Stomach, Antrum                                                                                C) - Esophagus, Distal                                                                      Final Diagnosis   Date Value Ref Range Status   04/15/2024   Final    A.  Duodenum, biopsy:  -Duodenitis with moderate to severe villous blunting, increased inflammation in lamina propria, foci of increased intraepithelial lymphocytes and gastric surface cell metaplasia (see comment).    B.  Stomach, antrum, biopsy:  -Superficial chronic gastritis.  -Special stain negative for H. pylori.    C.  Esophagus, distal, biopsy:  -Squamous mucosa with very mild reflux-type reactive inflammatory " changes.         ASSESSMENT:    73 year old male who is 2 weeks s/p Esophagogastroduodenoscopy with biopsy and Colonoscopy.  Probable celiac disease.    PLAN:   We had a long discussion about gluten-free diet.  Also given the inflammation in his stomach and esophagus there is probably a component of excess acid also.  At the end of the discussion he will go on a gluten-free diet and on Prilosec twice daily.  Will plan on a repeat upper endoscopy in 2 months to assess for healing.  Will also check labs for celiac disease.

## 2024-04-24 DIAGNOSIS — E04.1 RIGHT THYROID NODULE: Primary | ICD-10-CM

## 2024-04-24 NOTE — RESULT ENCOUNTER NOTE
Decreased size of right thyroid nodule and left is no longer visualized.  Recommend follow up in 1 year for US.  I will place order.  Thanks JS

## 2024-04-25 LAB
TTG IGA SER-ACNC: >128 U/ML
TTG IGG SER-ACNC: 1.1 U/ML

## 2024-04-30 NOTE — PROGRESS NOTES
Assessment & Plan     (I10) Essential hypertension  (primary encounter diagnosis)  Plan: Basic metabolic panel, losartan (COZAAR) 25 MG tablet        Well controlled. Continue current medications. Encouraged daily exercise and a low sodium diet. Recommended checking BP's 2x/wk, call the clinic if consistantly s>140 or d>90. Follow up in 3 months.     (N52.9) Erectile dysfunction, unspecified erectile dysfunction type  Plan: The Viagra 100 mg is working better. No side effects. Will continue.       Frances Chavez is a 73 year old, presenting for the following health issues:  Hypertension    History of Present Illness       Hypertension: He presents for follow up of hypertension.  He does not check blood pressure  regularly outside of the clinic. Outpatient blood pressures have not been over 140/90. He does not follow a low salt diet.     He eats 2-3 servings of fruits and vegetables daily.He consumes 0 sweetened beverage(s) daily.He exercises with enough effort to increase his heart rate 10 to 19 minutes per day.  He exercises with enough effort to increase his heart rate 3 or less days per week.   He is taking medications regularly.       Hypertension Follow-up    Last seen on 4/17/24. BP was 136/76. He does have diabetes. Losartan 25 mg was started. He also takes hydrochlorothiazide 25 mg.     Do you check your blood pressure regularly outside of the clinic? No   Are you following a low salt diet? No  Are your blood pressures ever more than 140 on the top number (systolic) OR more   than 90 on the bottom number (diastolic), for example 140/90? N/A  Denies chest pain, shortness of breath, dizziness, syncope, or palpitations.    Drinks little alcohol.     ED; Viagra was also recently increased from 50 mg to 100 mg. Feels this is working.         Review of Systems  Constitutional, HEENT, cardiovascular, pulmonary, gi and gu systems are negative, except as otherwise noted.      Objective    /70 (BP  "Location: Right arm, Patient Position: Sitting, Cuff Size: Adult Large)   Pulse 68   Temp 98.2  F (36.8  C) (Tympanic)   Ht 1.753 m (5' 9\")   Wt 95.3 kg (210 lb)   SpO2 97%   BMI 31.01 kg/m    Body mass index is 31.01 kg/m .  Physical Exam   GENERAL: alert and no distress  EYES: Eyes grossly normal to inspection, PERRL and conjunctivae and sclerae normal  HENT: ear canals and TM's normal, nose and mouth without ulcers or lesions  NECK: no adenopathy, no asymmetry, masses, or scars  RESP: lungs clear to auscultation - no rales, rhonchi or wheezes  CV: regular rate and rhythm, no murmur, click or rub, no peripheral edema  ABDOMEN: soft, nontender, no hepatosplenomegaly, no masses and bowel sounds normal  NEURO: Normal strength and tone, mentation intact and speech normal  PSYCH: mentation appears normal, affect normal/bright    BMP in process        Signed Electronically by: Usha Gray NP    "

## 2024-05-01 ENCOUNTER — OFFICE VISIT (OUTPATIENT)
Dept: FAMILY MEDICINE | Facility: OTHER | Age: 74
End: 2024-05-01
Attending: NURSE PRACTITIONER
Payer: COMMERCIAL

## 2024-05-01 VITALS
HEIGHT: 69 IN | BODY MASS INDEX: 31.1 KG/M2 | TEMPERATURE: 98.2 F | HEART RATE: 68 BPM | OXYGEN SATURATION: 97 % | WEIGHT: 210 LBS | SYSTOLIC BLOOD PRESSURE: 128 MMHG | DIASTOLIC BLOOD PRESSURE: 70 MMHG

## 2024-05-01 DIAGNOSIS — I10 ESSENTIAL HYPERTENSION: Primary | ICD-10-CM

## 2024-05-01 DIAGNOSIS — N52.9 ERECTILE DYSFUNCTION, UNSPECIFIED ERECTILE DYSFUNCTION TYPE: ICD-10-CM

## 2024-05-01 PROBLEM — K90.0 CELIAC DISEASE: Status: ACTIVE | Noted: 2024-05-01

## 2024-05-01 PROBLEM — R07.89 OTHER CHEST PAIN: Status: RESOLVED | Noted: 2022-10-24 | Resolved: 2024-05-01

## 2024-05-01 LAB
ANION GAP SERPL CALCULATED.3IONS-SCNC: 14 MMOL/L (ref 7–15)
BUN SERPL-MCNC: 20 MG/DL (ref 8–23)
CALCIUM SERPL-MCNC: 9.7 MG/DL (ref 8.8–10.2)
CHLORIDE SERPL-SCNC: 99 MMOL/L (ref 98–107)
CREAT SERPL-MCNC: 0.97 MG/DL (ref 0.67–1.17)
DEPRECATED HCO3 PLAS-SCNC: 26 MMOL/L (ref 22–29)
EGFRCR SERPLBLD CKD-EPI 2021: 83 ML/MIN/1.73M2
GLUCOSE SERPL-MCNC: 120 MG/DL (ref 70–99)
POTASSIUM SERPL-SCNC: 4.2 MMOL/L (ref 3.4–5.3)
SODIUM SERPL-SCNC: 139 MMOL/L (ref 135–145)

## 2024-05-01 PROCEDURE — 80048 BASIC METABOLIC PNL TOTAL CA: CPT | Mod: ZL | Performed by: NURSE PRACTITIONER

## 2024-05-01 PROCEDURE — G0463 HOSPITAL OUTPT CLINIC VISIT: HCPCS

## 2024-05-01 PROCEDURE — 36415 COLL VENOUS BLD VENIPUNCTURE: CPT | Mod: ZL | Performed by: NURSE PRACTITIONER

## 2024-05-01 PROCEDURE — 99213 OFFICE O/P EST LOW 20 MIN: CPT | Performed by: NURSE PRACTITIONER

## 2024-05-01 RX ORDER — LOSARTAN POTASSIUM 25 MG/1
25 TABLET ORAL DAILY
Qty: 90 TABLET | Refills: 1 | Status: SHIPPED | OUTPATIENT
Start: 2024-05-01 | End: 2024-10-07

## 2024-05-01 ASSESSMENT — PAIN SCALES - GENERAL: PAINLEVEL: NO PAIN (0)

## 2024-05-15 DIAGNOSIS — K21.00 GASTROESOPHAGEAL REFLUX DISEASE WITH ESOPHAGITIS, UNSPECIFIED WHETHER HEMORRHAGE: ICD-10-CM

## 2024-05-15 NOTE — TELEPHONE ENCOUNTER
Omeprazole      Last Written Prescription Date:  4/23/24  Last Fill Quantity: 60,   # refills: 3  Last Office Visit: 5/1/24  Future Office visit:    Next 5 appointments (look out 90 days)      Jul 15, 2024  2:30 PM  (Arrive by 2:15 PM)  Return Visit with Shanice Robledo NP  [facility] [place] ([facility] [place] ) [address]  [place] [address]  [phone]             Routing refill request to provider for review/approval because:

## 2024-05-16 RX ORDER — OMEPRAZOLE 40 MG/1
40 CAPSULE, DELAYED RELEASE ORAL 2 TIMES DAILY
Qty: 60 CAPSULE | Refills: 11 | Status: SHIPPED | OUTPATIENT
Start: 2024-05-16

## 2024-06-11 ENCOUNTER — ANCILLARY PROCEDURE (OUTPATIENT)
Dept: GENERAL RADIOLOGY | Facility: OTHER | Age: 74
End: 2024-06-11
Attending: ORTHOPAEDIC SURGERY
Payer: MEDICARE

## 2024-06-11 DIAGNOSIS — Z96.619 STATUS POST TOTAL SHOULDER ARTHROPLASTY: Primary | ICD-10-CM

## 2024-06-11 PROCEDURE — 73030 X-RAY EXAM OF SHOULDER: CPT | Mod: TC,RT

## 2024-06-15 ENCOUNTER — HEALTH MAINTENANCE LETTER (OUTPATIENT)
Age: 74
End: 2024-06-15

## 2024-06-19 ENCOUNTER — ANESTHESIA EVENT (OUTPATIENT)
Dept: SURGERY | Facility: HOSPITAL | Age: 74
End: 2024-06-19
Payer: MEDICARE

## 2024-06-19 ASSESSMENT — LIFESTYLE VARIABLES: TOBACCO_USE: 1

## 2024-06-19 NOTE — ANESTHESIA PREPROCEDURE EVALUATION
Anesthesia Pre-Procedure Evaluation    Patient: Scott Akhtar   MRN: [id number] : 1950        Procedure : Procedure(s):  ESOPHAGOGASTRODUODENOSCOPY          Past Medical History:   Diagnosis Date     Aneurysm of thoracic aorta (H24)      Arthritis 2019     Diabetes (H)      External thrombosed hemorrhoids      Hiatal hernia      Hypertension      Need for prophylactic vaccination and inoculation against unspecified single bacterial disease      Other chest pain      Thyroid disease     Benign biopsy      Past Surgical History:   Procedure Laterality Date     ABDOMINAL AORTIC ANEURYSM REPAIR       BACK SURGERY      laminectomy L5S1     BIOPSY  1971    Dermatitis herpetiformis     COLONOSCOPY       COLONOSCOPY N/A 10/14/2016    Procedure: COLONOSCOPY;  Surgeon: Daljit Salazar MD;  Location: [facility]     ENDOSCOPY UPPER, COLONOSCOPY, COMBINED N/A 4/15/2024    Procedure: Upper endoscopy with biopsy and colonoscopy;  Surgeon: Bello Lentz MD;  Location: [facility]     EXCISE LESION BACK N/A 2023    Procedure: EXCISION OF MASSES ON BACK AND LEFT POSTERIOR ARM;  Surgeon: Bello Lentz MD;  Location: [facility]     EYE SURGERY      Lasik     FINGER GANGLION CYST EXCISION       GENITOURINARY SURGERY      Vasectomy     L5 S1 Laminectomy  1991     ROTATOR CUFF REPAIR RT/LT  2006     SHOULDER SURGERY Right     replacement     TONSILLECTOMY  1971     VASCULAR SURGERY      Aortic aneurysm stent      Allergies   Allergen Reactions     Pseudoephedrine Hcl      Sudafed      Simvastatin GI Disturbance     Sulfa Antibiotics Nausea     Chinese Ink Other (See Comments)     Privet Other (See Comments)     Any Ink at all causes him to sneeze uncontrollable.      Short Ragweed Pollen Ext Cough, Itching, Rash and Difficulty breathing      Social History     Tobacco Use     Smoking status: Former     Current packs/day: 0.00     Average packs/day: 1 pack/day for 27.0 years (27.0 ttl  pk-yrs)     Types: Cigarettes, Cigars, Pipe     Start date: 1961     Quit date: 3/26/1988     Years since quittin.2     Passive exposure: Past     Smokeless tobacco: Never   Substance Use Topics     Alcohol use: Yes     Comment: 3 beers daily       Wt Readings from Last 1 Encounters:   24 95.3 kg (210 lb)        Anesthesia Evaluation   Pt has had prior anesthetic. Type: General and MAC.    History of anesthetic complications  - .  has been agressive waking up from [place]-pulled own ETT out after [facility] ago.    ROS/MED HX  ENT/Pulmonary:     (+)     RAYSA risk factors, snores loudly, hypertension,    allergic rhinitis,     tobacco use, Past use,  27  Pack-Year Hx,                      Neurologic: Comment: Sensorineural hearing loss       Cardiovascular: Comment: Aneurysm of thoracic aorta  Endovascular AAA repair with stent graft     (+) Dyslipidemia hypertension- Peripheral Vascular Disease-   -  - -                                 Previous cardiac testing   Echo: Date: 22 Results:  Interpretation Summary  Global and regional left ventricular function is normal with an EF of 55-60%.  Borderline right ventricular enlargement.  Both atria appear normal.  Trace tricuspid insufficiency is present.  Ascending aorta 4.04 cm.  Stress Test:  Date:  Results:   The nuclear stress test is abnormal. There is a small area of a   moderate degree of infarction in the apical and inferolateral segment(s)   of the left ventricle. The left ventricular ejection fraction at rest is   62%.  The left ventricular ejection fraction at stress is 52%.      The patient's exercise capacity is average.      A prior study was conducted on 10/30/2017.     Apical lateral fixed abnormality consistent with previous infarct.     Abnormally low left ventricular ejection fraction on the stress   ECG Reviewed:  Date: 10/24/22 Results:  NSR @69  Inferior infarct, anterior infarct  Cath:  Date: Results:      METS/Exercise  "Tolerance: >4 METS    Hematologic:  - neg hematologic  ROS     Musculoskeletal: Comment: Infected cyst of skin  L5 S1 Laminectomy  Muscle weakness  Degenerative scoliosis   (+)  arthritis (Gout),             GI/Hepatic: Comment: External thrombosed hemorrhoids  Diaphragmatic hernia     (+) GERD, Asymptomatic on medication,    hiatal hernia,       liver disease (Hepatic steatosis),       Renal/Genitourinary:  - neg Renal ROS     Endo:     (+)  type II DM, Last HgA1c: 6.5, date: 9/8/22, Not using insulin, - not using insulin pump.  not previously admitted for DM/DKA.  thyroid problem,     Obesity,       Psychiatric/Substance Use: Comment: Alcohol use    (+)     Recreational drug usage: Cannabis (2 days ago).    Infectious Disease:  - neg infectious disease ROS     Malignancy:  - neg malignancy ROS     Other: Comment: Subacute cutaneous lupus erythematosus     (+)  , H/O Chronic Pain: lumbar radiculopathy.,         Physical Exam    Airway        Mallampati: III   TM distance: < 3 FB      Respiratory Devices and Support         Dental       (+) Modest Abnormalities - crowns, retainers, 1 or 2 missing teeth      Cardiovascular   cardiovascular exam normal          Pulmonary   pulmonary exam normal              OUTSIDE LABS:  CBC:   Lab Results   Component Value Date    WBC 6.6 01/10/2023    WBC 5.9 06/07/2022    HGB 15.4 01/10/2023    HGB 14.9 06/07/2022    HCT 47.4 01/10/2023    HCT 46.2 06/07/2022     01/10/2023     06/07/2022     BMP:   Lab Results   Component Value Date     05/01/2024     04/17/2024    POTASSIUM 4.2 05/01/2024    POTASSIUM 4.0 04/17/2024    CHLORIDE 99 05/01/2024    CHLORIDE 103 04/17/2024    CO2 26 05/01/2024    CO2 30 (H) 04/17/2024    BUN 20.0 05/01/2024    BUN 14.8 04/17/2024    CR 0.97 05/01/2024    CR 0.86 04/17/2024     (H) 05/01/2024     (H) 04/17/2024     COAGS: No results found for: \"PTT\", \"INR\", \"FIBR\"  POC:   Lab Results   Component Value Date    "  (H) 03/12/2019     HEPATIC:   Lab Results   Component Value Date    ALBUMIN 4.3 04/17/2024    PROTTOTAL 7.3 04/17/2024    ALT 41 04/17/2024    AST 43 04/17/2024    ALKPHOS 60 04/17/2024    BILITOTAL 0.4 04/17/2024     OTHER:   Lab Results   Component Value Date    A1C 6.5 (H) 04/17/2024    SARAH 9.7 05/01/2024    TSH 2.65 04/17/2024       Anesthesia Plan    ASA Status:  3    NPO Status:  NPO Appropriate    Anesthesia Type: MAC.     - Reason for MAC: straight local not clinically adequate   Induction: Intravenous, Propofol.   Maintenance: Balanced.        Consents    Anesthesia Plan(s) and associated risks, benefits, and realistic alternatives discussed. Questions answered and patient/representative(s) expressed understanding.     - Discussed: Risks, Benefits and Alternatives for BOTH SEDATION and the PROCEDURE were discussed     - Discussed with:  Patient      - Extended Intubation/Ventilatory Support Discussed: No.      - Patient is DNR/DNI Status: No     Use of blood products discussed: No .     Postoperative Care            Comments:    Other Comments: Risks and benefits of MAC anesthetic discussed including dental damage, aspiration, loss of airway, conversion to general anesthetic, CV complications, MI, stroke, death. Pt wishes to proceed.            Eloy Santos, LACHO CRNA    I have reviewed the pertinent notes and labs in the chart from the past 30 days and (re)examined the patient.  Any updates or changes from those notes are reflected in this note.              # DMII: A1C = 6.5 % (Ref range: <5.7 %) within past 6 months

## 2024-06-25 NOTE — DISCHARGE INSTRUCTIONS
After Anesthesia (Sleep Medicine)  What should I do after anesthesia?  You should rest and relax for the next 24 hours. Avoid risky or difficult (strenuous) activity. A responsible adult should stay with you overnight.  Don't drive or use any heavy equipment for 24 hours. Even if you feel normal, your reactions may be affected by the sleep medicine given to you.  Don't drink alcohol or make any important decisions for 24 hours.  Slowly get back to your regular diet, as you feel able.  How should I expect to feel?  It's normal to feel dizzy, light-headed, or faint for up to a full day after anesthesia or while taking pain medicine. If this happens:   Sit down for a few minutes before standing.  Have someone help you when you get up to walk or use the bathroom.  If you have nausea (feel sick to your stomach) or vomit (throw up):   Drink clear liquids (such as apple juice, ginger ale, broth, or 7UP) until you feel better.  If you feel sick to your stomach, or you keep vomiting for 24 hours, please call the doctor.  What else should I know?  You might have a dry mouth, sore throat, muscle aches, or trouble sleeping. These should go away after 24 hours.  Please contact your doctor if you have any other symptoms that concern you, such as fever, pain, bleeding, fluid drainage, swelling, or headache, or if it's been over 8 to 10 hours and you still aren't able to pee (urinate).  If you have a history of sleep apnea, it's very important to use your CPAP machine for the next 24 hours when you nap or sleep.   For informational purposes only. Not to replace the advice of your health care provider. Copyright   2023 [employer]. All rights reserved. Clinically reviewed by Raul Mccain MD. [employer] [id number] - REV 09/23.  Upper GI Endoscopy: What to Expect at Home  Your Recovery  You had an upper GI endoscopy. Your doctor used a thin, lighted tube that bends to look at the inside of your esophagus, your stomach,  and the first part of the small intestine, called the duodenum.  After you have an endoscopy, you will stay at the hospital or clinic for 1 to 2 hours. This will allow the medicine to wear off. You will be able to go home after your doctor or nurse checks to make sure that you're not having any problems.  You may have to stay overnight if you had treatment during the test. You may have a sore throat for a day or two after the test.  This care sheet gives you a general idea about what to expect after the test.  How can you care for yourself at home?  Activity   Rest as much as you need to after you go home.  You should be able to go back to your usual activities the day after the test.  Diet   Follow your doctor's directions for eating after the test.  Drink plenty of fluids (unless your doctor has told you not to).  Medications   If you have a sore throat the day after the test, use an over-the-counter spray to numb your throat.  Follow-up care is a key part of your treatment and safety. Be sure to make and go to all appointments, and call your doctor if you are having problems. It's also a good idea to know your test results and keep a list of the medicines you take.  When should you call for help?   Call 911 anytime you think you may need emergency care. For example, call if:    You passed out (lost consciousness).     You have trouble breathing.     You pass maroon or bloody stools.   Call your doctor now or seek immediate medical care if:    You have pain that does not get better after your take pain medicine.     You have new or worse belly pain.     You have blood in your stools.     You are sick to your stomach and cannot keep fluids down.     You have a fever.     You cannot pass stools or gas.   Watch closely for changes in your health, and be sure to contact your doctor if:    Your throat still hurts after a day or two.     You do not get better as expected.   Where can you learn more?  Go to  "https://www.[employer].net/patiented  Enter J454 in the search box to learn more about \"Upper GI Endoscopy: What to Expect at Home.\"  Current as of: October 19, 2023               Content Version: 14.0    3590-8310 [email or web address].   Care instructions adapted under license by your healthcare professional. If you have questions about a medical condition or this instruction, always ask your healthcare professional. [employer] disclaims any warranty or liability for your use of this information.        "

## 2024-06-27 ENCOUNTER — HOSPITAL ENCOUNTER (OUTPATIENT)
Facility: HOSPITAL | Age: 74
Discharge: HOME | End: 2024-06-27
Attending: SURGERY | Admitting: SURGERY
Payer: MEDICARE

## 2024-06-27 ENCOUNTER — ANESTHESIA (OUTPATIENT)
Dept: SURGERY | Facility: HOSPITAL | Age: 74
End: 2024-06-27
Payer: MEDICARE

## 2024-06-27 VITALS
HEART RATE: 54 BPM | WEIGHT: 205 LBS | SYSTOLIC BLOOD PRESSURE: 115 MMHG | TEMPERATURE: 97.9 F | DIASTOLIC BLOOD PRESSURE: 76 MMHG | BODY MASS INDEX: 30.36 KG/M2 | RESPIRATION RATE: 16 BRPM | HEIGHT: 69 IN | OXYGEN SATURATION: 95 %

## 2024-06-27 LAB — GLUCOSE BLDC GLUCOMTR-MCNC: 107 MG/DL (ref 70–99)

## 2024-06-27 PROCEDURE — 88312 SPECIAL STAINS GROUP 1: CPT | Mod: TC | Performed by: SURGERY

## 2024-06-27 PROCEDURE — 250N000009 HC RX 250: Performed by: NURSE ANESTHETIST, CERTIFIED REGISTERED

## 2024-06-27 PROCEDURE — 710N000012 HC RECOVERY PHASE 2, PER MINUTE: Performed by: SURGERY

## 2024-06-27 PROCEDURE — 258N000003 HC RX IP 258 OP 636: Performed by: NURSE ANESTHETIST, CERTIFIED REGISTERED

## 2024-06-27 PROCEDURE — 43239 EGD BIOPSY SINGLE/MULTIPLE: CPT | Performed by: NURSE ANESTHETIST, CERTIFIED REGISTERED

## 2024-06-27 PROCEDURE — 360N000075 HC SURGERY LEVEL 2, PER MIN: Performed by: SURGERY

## 2024-06-27 PROCEDURE — 250N000011 HC RX IP 250 OP 636: Performed by: NURSE ANESTHETIST, CERTIFIED REGISTERED

## 2024-06-27 PROCEDURE — 99100 ANES PT EXTEME AGE<1 YR&>70: CPT | Performed by: NURSE ANESTHETIST, CERTIFIED REGISTERED

## 2024-06-27 PROCEDURE — 999N000141 HC STATISTIC PRE-PROCEDURE NURSING ASSESSMENT: Performed by: SURGERY

## 2024-06-27 PROCEDURE — 370N000017 HC ANESTHESIA TECHNICAL FEE, PER MIN: Performed by: SURGERY

## 2024-06-27 PROCEDURE — 43239 EGD BIOPSY SINGLE/MULTIPLE: CPT | Performed by: SURGERY

## 2024-06-27 PROCEDURE — 88312 SPECIAL STAINS GROUP 1: CPT | Mod: 26 | Performed by: PATHOLOGY

## 2024-06-27 PROCEDURE — 88305 TISSUE EXAM BY PATHOLOGIST: CPT | Mod: 26 | Performed by: PATHOLOGY

## 2024-06-27 PROCEDURE — 272N000001 HC OR GENERAL SUPPLY STERILE: Performed by: SURGERY

## 2024-06-27 PROCEDURE — 82962 GLUCOSE BLOOD TEST: CPT

## 2024-06-27 RX ORDER — SODIUM CHLORIDE, SODIUM LACTATE, POTASSIUM CHLORIDE, CALCIUM CHLORIDE 600; 310; 30; 20 MG/100ML; MG/100ML; MG/100ML; MG/100ML
INJECTION, SOLUTION INTRAVENOUS CONTINUOUS
Status: DISCONTINUED | OUTPATIENT
Start: 2024-06-27 | End: 2024-06-27 | Stop reason: HOSPADM

## 2024-06-27 RX ORDER — LABETALOL 20 MG/4 ML (5 MG/ML) INTRAVENOUS SYRINGE
10
Status: DISCONTINUED | OUTPATIENT
Start: 2024-06-27 | End: 2024-06-27 | Stop reason: HOSPADM

## 2024-06-27 RX ORDER — PROPOFOL 10 MG/ML
INJECTION, EMULSION INTRAVENOUS PRN
Status: DISCONTINUED | OUTPATIENT
Start: 2024-06-27 | End: 2024-06-27

## 2024-06-27 RX ORDER — FENTANYL CITRATE 50 UG/ML
50 INJECTION, SOLUTION INTRAMUSCULAR; INTRAVENOUS EVERY 5 MIN PRN
Status: DISCONTINUED | OUTPATIENT
Start: 2024-06-27 | End: 2024-06-27 | Stop reason: HOSPADM

## 2024-06-27 RX ORDER — OXYCODONE HYDROCHLORIDE 5 MG/1
5 TABLET ORAL
Status: DISCONTINUED | OUTPATIENT
Start: 2024-06-27 | End: 2024-06-27 | Stop reason: HOSPADM

## 2024-06-27 RX ORDER — HYDRALAZINE HYDROCHLORIDE 20 MG/ML
2.5-5 INJECTION INTRAMUSCULAR; INTRAVENOUS EVERY 10 MIN PRN
Status: DISCONTINUED | OUTPATIENT
Start: 2024-06-27 | End: 2024-06-27 | Stop reason: HOSPADM

## 2024-06-27 RX ORDER — ONDANSETRON 4 MG/1
4 TABLET, ORALLY DISINTEGRATING ORAL EVERY 30 MIN PRN
Status: DISCONTINUED | OUTPATIENT
Start: 2024-06-27 | End: 2024-06-27 | Stop reason: HOSPADM

## 2024-06-27 RX ORDER — DEXAMETHASONE SODIUM PHOSPHATE 10 MG/ML
4 INJECTION, SOLUTION INTRAMUSCULAR; INTRAVENOUS
Status: DISCONTINUED | OUTPATIENT
Start: 2024-06-27 | End: 2024-06-27 | Stop reason: HOSPADM

## 2024-06-27 RX ORDER — LIDOCAINE 40 MG/G
CREAM TOPICAL
Status: DISCONTINUED | OUTPATIENT
Start: 2024-06-27 | End: 2024-06-27 | Stop reason: HOSPADM

## 2024-06-27 RX ORDER — NALOXONE HYDROCHLORIDE 0.4 MG/ML
0.1 INJECTION, SOLUTION INTRAMUSCULAR; INTRAVENOUS; SUBCUTANEOUS
Status: DISCONTINUED | OUTPATIENT
Start: 2024-06-27 | End: 2024-06-27 | Stop reason: HOSPADM

## 2024-06-27 RX ORDER — ONDANSETRON 2 MG/ML
4 INJECTION INTRAMUSCULAR; INTRAVENOUS EVERY 30 MIN PRN
Status: DISCONTINUED | OUTPATIENT
Start: 2024-06-27 | End: 2024-06-27 | Stop reason: HOSPADM

## 2024-06-27 RX ORDER — LIDOCAINE HYDROCHLORIDE 20 MG/ML
INJECTION, SOLUTION INFILTRATION; PERINEURAL PRN
Status: DISCONTINUED | OUTPATIENT
Start: 2024-06-27 | End: 2024-06-27

## 2024-06-27 RX ADMIN — SODIUM CHLORIDE, POTASSIUM CHLORIDE, SODIUM LACTATE AND CALCIUM CHLORIDE: 600; 310; 30; 20 INJECTION, SOLUTION INTRAVENOUS at 12:53

## 2024-06-27 RX ADMIN — PROPOFOL 30 MG: 10 INJECTION, EMULSION INTRAVENOUS at 13:20

## 2024-06-27 RX ADMIN — LIDOCAINE HYDROCHLORIDE 100 MG: 20 INJECTION, SOLUTION INFILTRATION; PERINEURAL at 13:16

## 2024-06-27 RX ADMIN — PROPOFOL 100 MG: 10 INJECTION, EMULSION INTRAVENOUS at 13:18

## 2024-06-27 ASSESSMENT — ACTIVITIES OF DAILY LIVING (ADL): ADLS_ACUITY_SCORE: 33

## 2024-06-27 NOTE — H&P
HISTORY AND PHYSICAL - ENDOSCOPY   6/27/2024    Patient : Scott Akhtar    Planned Procedures : Esophagogastroduodenoscopy     This is a 74 year old male with a need for an Esophagogastroduodenoscopy for History of Peptic Ulcer Disease and celiac disease .    Past Medical History:  Past Medical History:   Diagnosis Date    Aneurysm of thoracic aorta (H24)     Arthritis 2019    Diabetes (H)     External thrombosed hemorrhoids     Hiatal hernia     Hypertension     Need for prophylactic vaccination and inoculation against unspecified single bacterial disease     Other chest pain     Thyroid disease 2022    Benign biopsy       Past Surgical History:  Past Surgical History:   Procedure Laterality Date    ABDOMINAL AORTIC ANEURYSM REPAIR      BACK SURGERY      laminectomy L5S1    BIOPSY  1971    Dermatitis herpetiformis    COLONOSCOPY      COLONOSCOPY N/A 10/14/2016    Procedure: COLONOSCOPY;  Surgeon: Daljit Salazar MD;  Location: [facility]    ENDOSCOPY UPPER, COLONOSCOPY, COMBINED N/A 4/15/2024    Procedure: Upper endoscopy with biopsy and colonoscopy;  Surgeon: Bello Lentz MD;  Location: [facility]    EXCISE LESION BACK N/A 01/16/2023    Procedure: EXCISION OF MASSES ON BACK AND LEFT POSTERIOR ARM;  Surgeon: Bello Lentz MD;  Location: [facility]    EYE SURGERY  2007    Lasik    FINGER GANGLION CYST EXCISION      GENITOURINARY SURGERY  1990    Vasectomy    L5 S1 Laminectomy  01/01/1991    ROTATOR CUFF REPAIR RT/LT  01/01/2006    SHOULDER SURGERY Right     replacement    TONSILLECTOMY  01/01/1971    VASCULAR SURGERY      Aortic aneurysm stent       Family History History:  Family History   Problem Relation Age of Onset    Depression Mother     Thyroid Disease Mother     Diabetes Father     Prostate Cancer Father     Hypertension Father     Breast Cancer No family hx of     Colon Cancer No family hx of        History of Tobacco Use:  History   Smoking Status    Former    Types: Cigarettes, Cigars, Pipe  "  Smokeless Tobacco    Never       Current Medications:  No current outpatient medications on file.       Allergies:  Allergies   Allergen Reactions    Pseudoephedrine Hcl      Sudafed     Simvastatin GI Disturbance    Sulfa Antibiotics Nausea    Privet Other (See Comments)     Any Ink at all causes him to sneeze uncontrollable.     Short Ragweed Pollen Ext Cough, Itching, Rash and Difficulty breathing       ROS:  Pertinent items are noted in HPI.  All other systems are negative.    PHYSICAL EXAM:     Vital signs: BP (!) 152/105   Pulse 57   Temp 97  F (36.1  C) (Tympanic)   Resp 16   Ht 1.753 m (5' 9\")   Wt 93 kg (205 lb)   SpO2 99%   BMI 30.27 kg/m     Weight: [unfilled]   BMI: Body mass index is 30.27 kg/m .   General: Normal, healthy, cooperative, in no acute distress, alert   Skin: no jaundice   HEENT: PERRLA and EOMI   Neck: supple   Lungs: clear to auscultation   CV: Regular rate and rhythm without murmer   Abdominal: abdomen is soft without significant tenderness, masses, organomegaly or guarding   Extremities: No cyanosis, clubbing or edema noted bilaterally in Upper and Lower Extremities   Neurological: without deficit    Assessment:   74 year old male with need of an Esophagogastroduodenoscopy for History of Peptic Ulcer Disease and celiac disease .    Plan:   Will plan on doing an Esophagogastroduodenoscopy.      The risks, benefits, and alternatives to the planned procedure were fully discussed with the patient and/or the patient's representative(s). The risks of bleeding, infection, death, missing pathology, the need for additional procedures intra-operatively, the possible need for intra-operative consults, the possible need for transfusion therapy, cardiopulmonary compromise, the possible need for additional surgery for a complication were discussed with the patient and/or the patient's representative(s). The patient's and/or patient's representative(s) questions were addressed and answered. " Informed consent was obtained from the patient and/or the patient's representative(s). The patient and/or the patient's representative(s) consent to proceed.    Specific risks:  Risks include but are not limited to bleeding, perforation, missing lesions, need for additional procedures, reaction to anesthesia.  All the patients questions were answered.  The patient consents to proceed.  The procedures will be scheduled.

## 2024-06-27 NOTE — ANESTHESIA POSTPROCEDURE EVALUATION
Patient: Scott Akhtar    Procedure: Procedure(s):  ESOPHAGOGASTRODUODENOSCOPY WITH BIOPSY       Anesthesia Type:  MAC    Note:  Disposition: Outpatient   Postop Pain Control: Uneventful            Sign Out: Well controlled pain   PONV: No   Neuro/Psych: Uneventful            Sign Out: Acceptable/Baseline neuro status   Airway/Respiratory: Uneventful            Sign Out: Acceptable/Baseline resp. status   CV/Hemodynamics: Uneventful            Sign Out: Acceptable CV status; No obvious hypovolemia; No obvious fluid overload   Other NRE: NONE   DID A NON-ROUTINE EVENT OCCUR? No       Last vitals:  Vitals Value Taken Time   BP 95/64 06/27/24 1333   Temp 97.9  F (36.6  C) 06/27/24 1325   Pulse 50 06/27/24 1332   Resp 16 06/27/24 1333   SpO2 95 % 06/27/24 1344   Vitals shown include unfiled device data.    Electronically Signed By: LACHO Payan CRNA  June 27, 2024  1:45 PM

## 2024-06-27 NOTE — ANESTHESIA CARE TRANSFER NOTE
Patient: Scott Akhtar    Procedure: Procedure(s):  ESOPHAGOGASTRODUODENOSCOPY WITH BIOPSY       Diagnosis: Celiac disease [K90.0]  Diagnosis Additional Information: No value filed.    Anesthesia Type:   MAC     Note:    Oropharynx: oropharynx clear of all foreign objects  Level of Consciousness: drowsy  Oxygen Supplementation: room air    Independent Airway: airway patency satisfactory and stable    Vital Signs Stable: post-procedure vital signs reviewed and stable  Report to RN Given: handoff report given  Patient transferred to: Phase II    Handoff Report: Identifed the Patient, Identified the Reponsible Provider, Reviewed the pertinent medical history, Discussed the surgical course, Reviewed Intra-OP anesthesia mangement and issues during anesthesia, Set expectations for post-procedure period and Allowed opportunity for questions and acknowledgement of understanding      Vitals:  Vitals Value Taken Time   BP     Temp     Pulse     Resp     SpO2         Electronically Signed By: Asia Wright  June 27, 2024  1:26 PM

## 2024-06-27 NOTE — OP NOTE
REPORT OF OPERATION  DATE OF PROCEDURE: 6/27/2024    PATIENT: Scott Akhtar    SURGERY PERFORMED: Esophagogastroduodenoscopy with biopsies     PREOPERATIVE DIAGNOSIS: Gastroesohpageal Reflux and celiac disease    POSTOPERATIVE DIAGNOSIS:    Normal Esophagogastroduodenoscopy   Squamous columnar junction at 40   Food within the stomach    SURGEON: Bello Lentz MD    ASSISTANTS: None    ANESTHESIA: Monitored Anesthesia Care    COMPLICATIONS: None apparent    TRANSFUSIONS: None    TISSUE TO PATHOLOGY: Duodenal, Antral, and Distal Esophageal    FINDINGS: Normal Esophagogastroduodenoscopy    INDICATIONS: This is a 74 year old male in need of an Esophagogastroduodenoscopy for Gastroesohpageal Reflux and celiac disease .  The patient will be taken to the endoscopy suite for that procedure.    DESCRIPTIONS OF PROCEDURE IN DETAIL: After consent was obtained the patient was taken to the operative suite and sera in the left lateral decubitus position.  The patient was identified and the correct patient was confirmed. Monitored Anesthesia Care was given by anesthesia. A time out was performed verifying the correct patient and the correct procedure.  The entire operative team was in agreement.  All necessary equipment and supplies were in the room.    The endoscope was inserted into the mouth and passed without difficulty to the third portion of the duodenum.  Duodenal biopsies were taken.  The endoscope was then withdrawn through the duodenum, the duodenal bulb and pyloric channel and no abnormalities were noted.  Of note the small bowel mucosa had some mild blunting of the cilia but when compared to his previous upper endoscopy 6 weeks ago much improved.  The endoscope was brought back into the stomach and antral biopsies were obtained.  The endoscope was then retroflexed and no lesions of the fundus body or antrum were seen.  The endoscope was straightened back out and brought into the distal esophagus and a  well-defined squamocolumnar junction was identified at 40 cm. Biopsies of the distal esophagus were taken.  The endoscope was slowly withdrawn through the remaining esophagus no other abnormalities are seen,  The endoscope was withdrawn from the patient the patient was then taken to the recovery room in stable condition tolerated the procedure well.

## 2024-07-01 LAB
PATH REPORT.COMMENTS IMP SPEC: NORMAL
PATH REPORT.COMMENTS IMP SPEC: NORMAL
PATH REPORT.FINAL DX SPEC: NORMAL
PATH REPORT.GROSS SPEC: NORMAL
PATH REPORT.MICROSCOPIC SPEC OTHER STN: NORMAL
PATH REPORT.MICROSCOPIC SPEC OTHER STN: NORMAL
PATH REPORT.RELEVANT HX SPEC: NORMAL
PHOTO IMAGE: NORMAL

## 2024-07-15 ENCOUNTER — TELEPHONE (OUTPATIENT)
Dept: OTOLARYNGOLOGY | Facility: OTHER | Age: 74
End: 2024-07-15

## 2024-07-15 NOTE — CONFIDENTIAL NOTE
July 15, 2024    Ciarra Robledo out of office. Left message for patient to return call to reschedule.    -Ashly Monroe on 7/15/2024 at 8:15 AM

## 2024-07-31 ENCOUNTER — MEDICAL CORRESPONDENCE (OUTPATIENT)
Dept: MRI IMAGING | Facility: HOSPITAL | Age: 74
End: 2024-07-31

## 2024-08-13 ENCOUNTER — HOSPITAL ENCOUNTER (OUTPATIENT)
Dept: MRI IMAGING | Facility: HOSPITAL | Age: 74
Discharge: HOME | End: 2024-08-13
Attending: STUDENT IN AN ORGANIZED HEALTH CARE EDUCATION/TRAINING PROGRAM | Admitting: STUDENT IN AN ORGANIZED HEALTH CARE EDUCATION/TRAINING PROGRAM
Payer: MEDICARE

## 2024-08-13 DIAGNOSIS — M54.50 LOW BACK PAIN: ICD-10-CM

## 2024-08-13 PROCEDURE — 72148 MRI LUMBAR SPINE W/O DYE: CPT

## 2024-08-27 ENCOUNTER — OFFICE VISIT (OUTPATIENT)
Dept: OTOLARYNGOLOGY | Facility: OTHER | Age: 74
End: 2024-08-27
Attending: NURSE PRACTITIONER
Payer: COMMERCIAL

## 2024-08-27 VITALS
BODY MASS INDEX: 30.37 KG/M2 | TEMPERATURE: 96.8 F | RESPIRATION RATE: 18 BRPM | HEART RATE: 55 BPM | WEIGHT: 205.03 LBS | HEIGHT: 69 IN | OXYGEN SATURATION: 95 % | SYSTOLIC BLOOD PRESSURE: 135 MMHG | DIASTOLIC BLOOD PRESSURE: 82 MMHG

## 2024-08-27 DIAGNOSIS — H61.23 BILATERAL IMPACTED CERUMEN: Primary | ICD-10-CM

## 2024-08-27 DIAGNOSIS — J31.0 CHRONIC RHINITIS: ICD-10-CM

## 2024-08-27 DIAGNOSIS — L29.9 EAR ITCHING: ICD-10-CM

## 2024-08-27 PROCEDURE — 92504 EAR MICROSCOPY EXAMINATION: CPT | Performed by: NURSE PRACTITIONER

## 2024-08-27 PROCEDURE — 69210 REMOVE IMPACTED EAR WAX UNI: CPT | Performed by: NURSE PRACTITIONER

## 2024-08-27 PROCEDURE — 99213 OFFICE O/P EST LOW 20 MIN: CPT | Mod: 25 | Performed by: NURSE PRACTITIONER

## 2024-08-27 PROCEDURE — G0463 HOSPITAL OUTPT CLINIC VISIT: HCPCS

## 2024-08-27 RX ORDER — MOMETASONE FUROATE 1 MG/G
CREAM TOPICAL DAILY
Qty: 15 G | Refills: 3 | Status: SHIPPED | OUTPATIENT
Start: 2024-08-27

## 2024-08-27 RX ORDER — HYDROCODONE BITARTRATE AND ACETAMINOPHEN 5; 325 MG/1; MG/1
TABLET ORAL
COMMUNITY
Start: 2024-08-01

## 2024-08-27 RX ORDER — IPRATROPIUM BROMIDE 42 UG/1
2 SPRAY, METERED NASAL 3 TIMES DAILY PRN
Qty: 15 ML | Refills: 11 | Status: SHIPPED | OUTPATIENT
Start: 2024-08-27

## 2024-08-27 ASSESSMENT — PAIN SCALES - GENERAL: PAINLEVEL: NO PAIN (0)

## 2024-08-27 NOTE — PATIENT INSTRUCTIONS
Start ipratropium nasal spray - 2 sprays to each nostril three times daily as needed   Start mometasone cream to the inside of both ears daily x 1 week then as needed    Follow up in 3 months for ear cleaning      Thank you for allowing Shanice COCHRAN and our ENT team to participate in your care.  If your medications are too expensive, please call my nurse at the number listed below.  We can possibly change this medication.    If you have a scheduling or an appointment question please contact our Health Unit Coordinator at their direct line [phone] ext [phone]  ALL nursing questions or concerns can be directed to my Nurse Merlyn [phone].

## 2024-08-27 NOTE — PROGRESS NOTES
Otolaryngology Note         Chief Complaint:     Patient presents with:  Ear Problem  Follow Up: Ear recheck           History of Present Illness:     Scott Akhtar is a 74 year old male who presents today for ear cleaning.    He has bilateral ear itching  He is getting some increased plugging, decreased hearing   He has an app[facility] for [facility]   He was last seen in ENT on 3/13/2024 for ear cleaning.  No bothersome tinnitus  No vertigo, facial numbness or weakness.      No otalgia, otorrhea.    No hx of COM or otologic surgeries.          Medications:     Current Outpatient Rx   Medication Sig Dispense Refill    allopurinol (ZYLOPRIM) 100 MG tablet Take 1 tablet (100 mg) by mouth 2 times daily 180 tablet 3    ASPIRIN EC PO Take 81 mg by mouth daily      blood glucose (NO BRAND SPECIFIED) test strip 1 strip by In Vitro route daily Use to test blood sugar 1 times daily or as directed. 100 strip 3    Cholecalciferol (VITAMIN D3 PO) Take 5,000 Units by mouth daily 2000 to 5000      coenzyme Q-10 (CO-Q10) 50 MG capsule Take 2 capsules (100 mg) by mouth daily 180 capsule 0    ezetimibe (ZETIA) 10 MG tablet Take 1 tablet (10 mg) by mouth daily 90 tablet 3    hydrochlorothiazide (HYDRODIURIL) 25 MG tablet Take 1 tablet (25 mg) by mouth daily 90 tablet 1    ipratropium (ATROVENT) 0.06 % nasal spray Spray 2 sprays into both nostrils 3 times daily as needed for rhinitis. 15 mL 11    losartan (COZAAR) 25 MG tablet Take 1 tablet (25 mg) by mouth daily 90 tablet 1    metFORMIN (GLUCOPHAGE) 500 MG tablet Take 2 tablets (1,000 mg) by mouth 2 times daily (with meals)      mometasone (ELOCON) 0.1 % external cream Apply topically daily. 15 g 3    Multiple Minerals (CALCIUM-MAGNESIUM-ZINC) TABS Take 1 tablet by mouth daily      nystatin (MYCOSTATIN) 633862 UNIT/GM external powder Apply topically 4 times daily 60 g 1    Omega-3 Fatty Acids (OMEGA-3 FISH OIL PO) Take 1,200 mg by mouth daily       omeprazole (PRILOSEC) 40 MG DR capsule Take 1 capsule (40 mg) by mouth 2 times daily 60 capsule 11    ONETOUCH ULTRA test strip TEST ONCE A  strip 3    potassium chloride ER (KLOR-CON) 10 MEQ CR tablet Take 1 tablet (10 mEq) by mouth daily 90 tablet 3    sildenafil (VIAGRA) 100 MG tablet Take 1 tablet (100 mg) by mouth daily as needed 12 tablet 1    HYDROcodone-acetaminophen (NORCO) 5-325 MG tablet Take 1 tablet by mouth every 6-8 hours as needed for pain (Patient not taking: Reported on 8/27/2024)              Allergies:     Allergies: Gluten meal, Pseudoephedrine hcl, Simvastatin, Sulfa antibiotics, Privet, and Short ragweed pollen ext          Past Medical History:     Past Medical History:   Diagnosis Date    Aneurysm of thoracic aorta (H24)     Arthritis 2019    Diabetes (H)     External thrombosed hemorrhoids     Hiatal hernia     Hypertension     Need for prophylactic vaccination and inoculation against unspecified single bacterial disease     Other chest pain     Thyroid disease 2022    Benign biopsy            Past Surgical History:     Past Surgical History:   Procedure Laterality Date    ABDOMINAL AORTIC ANEURYSM REPAIR      BACK SURGERY      laminectomy L5S1    BIOPSY  1971    Dermatitis herpetiformis    COLONOSCOPY      COLONOSCOPY N/A 10/14/2016    Procedure: COLONOSCOPY;  Surgeon: Daljit Salazar MD;  Location: [facility]    ENDOSCOPY UPPER, COLONOSCOPY, COMBINED N/A 4/15/2024    Procedure: Upper endoscopy with biopsy and colonoscopy;  Surgeon: Bello Lenzt MD;  Location: [facility]    ESOPHAGOSCOPY, GASTROSCOPY, DUODENOSCOPY (EGD), COMBINED N/A 6/27/2024    Procedure: ESOPHAGOGASTRODUODENOSCOPY WITH BIOPSY;  Surgeon: Bello Lentz MD;  Location: [facility]    EXCISE LESION BACK N/A 01/16/2023    Procedure: EXCISION OF MASSES ON BACK AND LEFT POSTERIOR ARM;  Surgeon: Bello Lentz MD;  Location: [facility]    EYE SURGERY  2007    Lasik    FINGER GANGLION CYST EXCISION       "GENITOURINARY SURGERY      Vasectomy    L5 S1 Laminectomy  1991    ROTATOR CUFF REPAIR RT/LT  2006    SHOULDER SURGERY Right     replacement    TONSILLECTOMY  1971    VASCULAR SURGERY      Aortic aneurysm stent       ENT family history reviewed         Social History:     Social History     Tobacco Use    Smoking status: Former     Current packs/day: 0.00     Average packs/day: 1 pack/day for 27.0 years (27.0 ttl pk-yrs)     Types: Cigarettes, Cigars, Pipe     Start date: 1961     Quit date: 3/26/1988     Years since quittin.4     Passive exposure: Past    Smokeless tobacco: Never   Vaping Use    Vaping status: Never Used   Substance Use Topics    Alcohol use: Yes     Comment: 3 beers daily     Drug use: Never     Comment: takes a weekly THC gummy for pain control            Review of Systems:     ROS: See HPI         Physical Exam:     /82 (BP Location: Right arm, Patient Position: Sitting, Cuff Size: Adult Large)   Pulse 55   Temp 96.8  F (36  C) (Tympanic)   Resp 18   Ht 1.753 m (5' 9.02\")   Wt 93 kg (205 lb 0.4 oz)   SpO2 95%   BMI 30.26 kg/m      General - The patient is well nourished and well developed, and appears to have good nutritional status.  Alert and oriented to person and place, answers questions and cooperates with examination appropriately.   Head and Face - Normocephalic and atraumatic, with no gross asymmetry noted.  The facial nerve is intact, with strong symmetric movements.  Voice and Breathing - The patient was breathing comfortably without the use of accessory muscles. There was no wheezing, stridor. The patients voice was clear and strong, and had appropriate pitch and quality.  Ears - The ears were examined with binocular microscopy and with otoscope.  External ears normal. Right canal cerumen impacted.  Left canal cerumen impacted.  The right ear was cleaned with gentle suctioning using #7, #5 Merritt.   Right tympanic membrane is intact without " effusion, retraction or mass. The left ear was cleaned in the same manner.  Left tympanic membrane is intact without effusion, retraction or mass.  Eyes - Extraocular movements intact, sclera were not icteric or injected, conjunctiva were pink and moist.  Mouth - Examination of the oral cavity showed pink, healthy oral mucosa. Dentition in good condition. No lesions or ulcerations noted. The tongue was mobile and midline.   Throat - The walls of the oropharynx were smooth, pink, moist, symmetric, and had no lesions or ulcerations.  The tonsillar pillars and soft palate were symmetric. The uvula was midline on elevation.    Neck - Full range of motion on passive movement.  Palpation of the occipital, submental, submandibular, internal jugular chain, and supraclavicular nodes did not demonstrate any abnormal lymph nodes or masses.    Nose - External contour is symmetric, no gross deflection or scars.  Nasal mucosa is pink and moist with no abnormal mucus.  The septum and turbinates were evaluated with nasal speculum, no polyps, masses, or purulence noted on examination.         Assessment and Plan:       ICD-10-CM    1. Bilateral impacted cerumen  H61.23       2. Ear itching  L29.9 mometasone (ELOCON) 0.1 % external cream      3. Chronic rhinitis  J31.0 ipratropium (ATROVENT) 0.06 % nasal spray        Start ipratropium nasal spray - 2 sprays to each nostril three times daily as needed   Start mometasone cream to the inside of both ears daily x 1 week then as needed    Follow up in 3 months for ear cleaning  Ears were cleaned, tolerated well    The ears were cleaned today.  The patient may return here as needed or with their primary physician.  Aural hygiene was discussed.  Avoidance of Q-tips was reiterated.  Avoid flushing the ear canal if there is a possibility of a hole or perforation in the tympanic membrane.   If there are any unresolved concerns regarding hearing loss an audiogram is recommended.      Shanice  Meena COCHRAN  [facility]

## 2024-08-27 NOTE — LETTER
8/27/2024      Scott Akhtar  [address] [facility] [address]      Dear Colleague,    Thank you for referring your patient, Scott Akhtar, to the [facility]. Please see a copy of my visit note below.    Otolaryngology Note         Chief Complaint:     Patient presents with:  Ear Problem  Follow Up: Ear recheck           History of Present Illness:     Scott Akhtar is a 74 year old male who presents today for ear cleaning.    He has bilateral ear itching  He is getting some increased plugging, decreased hearing   [facility]   He was last seen in ENT on 3/13/2024 for ear cleaning.  No bothersome tinnitus  No vertigo, facial numbness or weakness.      No otalgia, otorrhea.    No hx of COM or otologic surgeries.          Medications:     Current Outpatient Rx   Medication Sig Dispense Refill     allopurinol (ZYLOPRIM) 100 MG tablet Take 1 tablet (100 mg) by mouth 2 times daily 180 tablet 3     ASPIRIN EC PO Take 81 mg by mouth daily       blood glucose (NO BRAND SPECIFIED) test strip 1 strip by In Vitro route daily Use to test blood sugar 1 times daily or as directed. 100 strip 3     Cholecalciferol (VITAMIN D3 PO) Take 5,000 Units by mouth daily 2000 to 5000       coenzyme Q-10 (CO-Q10) 50 MG capsule Take 2 capsules (100 mg) by mouth daily 180 capsule 0     ezetimibe (ZETIA) 10 MG tablet Take 1 tablet (10 mg) by mouth daily 90 tablet 3     hydrochlorothiazide (HYDRODIURIL) 25 MG tablet Take 1 tablet (25 mg) by mouth daily 90 tablet 1     ipratropium (ATROVENT) 0.06 % nasal spray Spray 2 sprays into both nostrils 3 times daily as needed for rhinitis. 15 mL 11     losartan (COZAAR) 25 MG tablet Take 1 tablet (25 mg) by mouth daily 90 tablet 1     metFORMIN (GLUCOPHAGE) 500 MG tablet Take 2 tablets (1,000 mg) by mouth 2 times daily (with meals)       mometasone (ELOCON) 0.1 % external cream Apply topically daily. 15 g 3      Multiple Minerals (CALCIUM-MAGNESIUM-ZINC) TABS Take 1 tablet by mouth daily       nystatin (MYCOSTATIN) 862322 UNIT/GM external powder Apply topically 4 times daily 60 g 1     Omega-3 Fatty Acids (OMEGA-3 FISH OIL PO) Take 1,200 mg by mouth daily       omeprazole (PRILOSEC) 40 MG DR capsule Take 1 capsule (40 mg) by mouth 2 times daily 60 capsule 11     ONETOUCH ULTRA test strip TEST ONCE A  strip 3     potassium chloride ER (KLOR-CON) 10 MEQ CR tablet Take 1 tablet (10 mEq) by mouth daily 90 tablet 3     sildenafil (VIAGRA) 100 MG tablet Take 1 tablet (100 mg) by mouth daily as needed 12 tablet 1     HYDROcodone-acetaminophen (NORCO) 5-325 MG tablet Take 1 tablet by mouth every 6-8 hours as needed for pain (Patient not taking: Reported on 8/27/2024)              Allergies:     Allergies: Gluten meal, Pseudoephedrine hcl, Simvastatin, Sulfa antibiotics, Privet, and Short ragweed pollen ext          Past Medical History:     Past Medical History:   Diagnosis Date     Aneurysm of thoracic aorta (H24)      Arthritis 2019     Diabetes (H)      External thrombosed hemorrhoids      Hiatal hernia      Hypertension      Need for prophylactic vaccination and inoculation against unspecified single bacterial disease      Other chest pain      Thyroid disease 2022    Benign biopsy            Past Surgical History:     Past Surgical History:   Procedure Laterality Date     ABDOMINAL AORTIC ANEURYSM REPAIR       BACK SURGERY      laminectomy L5S1     BIOPSY  1971    Dermatitis herpetiformis     COLONOSCOPY       COLONOSCOPY N/A 10/14/2016    Procedure: COLONOSCOPY;  Surgeon: Daljit Salazar MD;  Location: [facility]     ENDOSCOPY UPPER, COLONOSCOPY, COMBINED N/A 4/15/2024    Procedure: Upper endoscopy with biopsy and colonoscopy;  Surgeon: Bello Lentz MD;  Location: [facility]     ESOPHAGOSCOPY, GASTROSCOPY, DUODENOSCOPY (EGD), COMBINED N/A 6/27/2024    Procedure: ESOPHAGOGASTRODUODENOSCOPY WITH BIOPSY;  Surgeon:  "Bello Lentz MD;  Location: [facility]     EXCISE LESION BACK N/A 2023    Procedure: EXCISION OF MASSES ON BACK AND LEFT POSTERIOR ARM;  Surgeon: Bello Lentz MD;  Location: [facility]     EYE SURGERY  2007    Lasik     FINGER GANGLION CYST EXCISION       GENITOURINARY SURGERY      Vasectomy     L5 S1 Laminectomy  1991     ROTATOR CUFF REPAIR RT/LT  2006     SHOULDER SURGERY Right     replacement     TONSILLECTOMY  1971     VASCULAR SURGERY      Aortic aneurysm stent       ENT family history reviewed         Social History:     Social History     Tobacco Use     Smoking status: Former     Current packs/day: 0.00     Average packs/day: 1 pack/day for 27.0 years (27.0 ttl pk-yrs)     Types: Cigarettes, Cigars, Pipe     Start date: 1961     Quit date: 3/26/1988     Years since quittin.4     Passive exposure: Past     Smokeless tobacco: Never   Vaping Use     Vaping status: Never Used   Substance Use Topics     Alcohol use: Yes     Comment: 3 beers daily      Drug use: Never     Comment: takes a weekly THC gummy for pain control            Review of Systems:     ROS: See HPI         Physical Exam:     /82 (BP Location: Right arm, Patient Position: Sitting, Cuff Size: Adult Large)   Pulse 55   Temp 96.8  F (36  C) (Tympanic)   Resp 18   Ht 1.753 m (5' 9.02\")   Wt 93 kg (205 lb 0.4 oz)   SpO2 95%   BMI 30.26 kg/m      General - The patient is well nourished and well developed, and appears to have good nutritional status.  Alert and oriented to person and place, answers questions and cooperates with examination appropriately.   Head and Face - Normocephalic and atraumatic, with no gross asymmetry noted.  The facial nerve is intact, with strong symmetric movements.  Voice and Breathing - The patient was breathing comfortably without the use of accessory muscles. There was no wheezing, stridor. The patients voice was clear and strong, and had appropriate pitch and " quality.  Ears - The ears were examined with binocular microscopy and with otoscope.  External ears normal. Right canal cerumen impacted.  Left canal cerumen impacted.  The right ear was cleaned with gentle suctioning using #7, #5 [place]itt.   Right tympanic membrane is intact without effusion, retraction or mass. The left ear was cleaned in the same manner.  Left tympanic membrane is intact without effusion, retraction or mass.  Eyes - Extraocular movements intact, sclera were not icteric or injected, conjunctiva were pink and moist.  Mouth - Examination of the oral cavity showed pink, healthy oral mucosa. Dentition in good condition. No lesions or ulcerations noted. The tongue was mobile and midline.   Throat - The walls of the oropharynx were smooth, pink, moist, symmetric, and had no lesions or ulcerations.  The tonsillar pillars and soft palate were symmetric. The uvula was midline on elevation.    Neck - Full range of motion on passive movement.  Palpation of the occipital, submental, submandibular, internal jugular chain, and supraclavicular nodes did not demonstrate any abnormal lymph nodes or masses.    Nose - External contour is symmetric, no gross deflection or scars.  Nasal mucosa is pink and moist with no abnormal mucus.  The septum and turbinates were evaluated with nasal speculum, no polyps, masses, or purulence noted on examination.         Assessment and Plan:       ICD-10-CM    1. Bilateral impacted cerumen  H61.23       2. Ear itching  L29.9 mometasone (ELOCON) 0.1 % external cream      3. Chronic rhinitis  J31.0 ipratropium (ATROVENT) 0.06 % nasal spray        Start ipratropium nasal spray - 2 sprays to each nostril three times daily as needed   Start mometasone cream to the inside of both ears daily x 1 week then as needed    Follow up in 3 months for ear cleaning  Ears were cleaned, tolerated well    The ears were cleaned today.  The patient may return here as needed or with their primary  physician.  Aural hygiene was discussed.  Avoidance of Q-tips was reiterated.  Avoid flushing the ear canal if there is a possibility of a hole or perforation in the tympanic membrane.   If there are any unresolved concerns regarding hearing loss an audiogram is recommended.      Shanice COCHRAN  [facility]    Again, thank you for allowing me to participate in the care of your patient.        Sincerely,        Shanice Robledo NP

## 2024-09-23 ENCOUNTER — HOSPITAL ENCOUNTER (EMERGENCY)
Facility: HOSPITAL | Age: 74
Discharge: HOME | End: 2024-09-23
Attending: NURSE PRACTITIONER
Payer: MEDICARE

## 2024-09-23 ENCOUNTER — APPOINTMENT (OUTPATIENT)
Dept: CT IMAGING | Facility: HOSPITAL | Age: 74
End: 2024-09-23
Attending: NURSE PRACTITIONER
Payer: MEDICARE

## 2024-09-23 ENCOUNTER — NURSE TRIAGE (OUTPATIENT)
Dept: CARE COORDINATION | Facility: OTHER | Age: 74
End: 2024-09-23

## 2024-09-23 ENCOUNTER — MYC MEDICAL ADVICE (OUTPATIENT)
Dept: FAMILY MEDICINE | Facility: OTHER | Age: 74
End: 2024-09-23

## 2024-09-23 ENCOUNTER — APPOINTMENT (OUTPATIENT)
Dept: GENERAL RADIOLOGY | Facility: HOSPITAL | Age: 74
End: 2024-09-23
Attending: NURSE PRACTITIONER
Payer: MEDICARE

## 2024-09-23 VITALS
OXYGEN SATURATION: 94 % | TEMPERATURE: 97.9 F | DIASTOLIC BLOOD PRESSURE: 86 MMHG | RESPIRATION RATE: 16 BRPM | SYSTOLIC BLOOD PRESSURE: 144 MMHG | HEART RATE: 51 BPM

## 2024-09-23 DIAGNOSIS — S00.93XA HEAD CONTUSION: ICD-10-CM

## 2024-09-23 DIAGNOSIS — R55 SYNCOPE AND COLLAPSE: ICD-10-CM

## 2024-09-23 DIAGNOSIS — S20.211A RIB CONTUSION, RIGHT, INITIAL ENCOUNTER: ICD-10-CM

## 2024-09-23 LAB
ANION GAP SERPL CALCULATED.3IONS-SCNC: 13 MMOL/L (ref 7–15)
BUN SERPL-MCNC: 14.6 MG/DL (ref 8–23)
CALCIUM SERPL-MCNC: 9.2 MG/DL (ref 8.8–10.4)
CHLORIDE SERPL-SCNC: 101 MMOL/L (ref 98–107)
CREAT SERPL-MCNC: 1 MG/DL (ref 0.67–1.17)
EGFRCR SERPLBLD CKD-EPI 2021: 79 ML/MIN/1.73M2
ERYTHROCYTE [DISTWIDTH] IN BLOOD BY AUTOMATED COUNT: 14.6 % (ref 10–15)
GLUCOSE SERPL-MCNC: 103 MG/DL (ref 70–99)
HCO3 SERPL-SCNC: 26 MMOL/L (ref 22–29)
HCT VFR BLD AUTO: 39.3 % (ref 40–53)
HGB BLD-MCNC: 12.9 G/DL (ref 13.3–17.7)
HOLD SPECIMEN: NORMAL
MAGNESIUM SERPL-MCNC: 1.6 MG/DL (ref 1.7–2.3)
MCH RBC QN AUTO: 31.5 PG (ref 26.5–33)
MCHC RBC AUTO-ENTMCNC: 32.8 G/DL (ref 31.5–36.5)
MCV RBC AUTO: 96 FL (ref 78–100)
PLATELET # BLD AUTO: 158 10E3/UL (ref 150–450)
POTASSIUM SERPL-SCNC: 4.1 MMOL/L (ref 3.4–5.3)
RBC # BLD AUTO: 4.1 10E6/UL (ref 4.4–5.9)
SODIUM SERPL-SCNC: 140 MMOL/L (ref 135–145)
TROPONIN T SERPL HS-MCNC: 12 NG/L
WBC # BLD AUTO: 5.6 10E3/UL (ref 4–11)

## 2024-09-23 PROCEDURE — 71101 X-RAY EXAM UNILAT RIBS/CHEST: CPT | Mod: RT

## 2024-09-23 PROCEDURE — 80048 BASIC METABOLIC PNL TOTAL CA: CPT | Performed by: NURSE PRACTITIONER

## 2024-09-23 PROCEDURE — 83735 ASSAY OF MAGNESIUM: CPT | Performed by: NURSE PRACTITIONER

## 2024-09-23 PROCEDURE — 85027 COMPLETE CBC AUTOMATED: CPT | Performed by: NURSE PRACTITIONER

## 2024-09-23 PROCEDURE — 99284 EMERGENCY DEPT VISIT MOD MDM: CPT | Performed by: NURSE PRACTITIONER

## 2024-09-23 PROCEDURE — 93005 ELECTROCARDIOGRAM TRACING: CPT

## 2024-09-23 PROCEDURE — 99285 EMERGENCY DEPT VISIT HI MDM: CPT | Mod: 25

## 2024-09-23 PROCEDURE — G1010 CDSM STANSON: HCPCS

## 2024-09-23 PROCEDURE — 84484 ASSAY OF TROPONIN QUANT: CPT | Performed by: NURSE PRACTITIONER

## 2024-09-23 PROCEDURE — 93010 ELECTROCARDIOGRAM REPORT: CPT | Performed by: INTERNAL MEDICINE

## 2024-09-23 PROCEDURE — 36415 COLL VENOUS BLD VENIPUNCTURE: CPT | Performed by: NURSE PRACTITIONER

## 2024-09-23 ASSESSMENT — ENCOUNTER SYMPTOMS
PSYCHIATRIC NEGATIVE: 1
WEAKNESS: 0
ENDOCRINE NEGATIVE: 1
NUMBNESS: 0
HEMATOLOGIC/LYMPHATIC NEGATIVE: 1
DIZZINESS: 0
HEADACHES: 0
GASTROINTESTINAL NEGATIVE: 1
ALLERGIC/IMMUNOLOGIC NEGATIVE: 1
SEIZURES: 0
CONSTITUTIONAL NEGATIVE: 1
EYES NEGATIVE: 1
RESPIRATORY NEGATIVE: 1
LIGHT-HEADEDNESS: 0
NECK PAIN: 1
CARDIOVASCULAR NEGATIVE: 1

## 2024-09-23 ASSESSMENT — ACTIVITIES OF DAILY LIVING (ADL)
ADLS_ACUITY_SCORE: 35
ADLS_ACUITY_SCORE: 35

## 2024-09-23 ASSESSMENT — COLUMBIA-SUICIDE SEVERITY RATING SCALE - C-SSRS
2. HAVE YOU ACTUALLY HAD ANY THOUGHTS OF KILLING YOURSELF IN THE PAST MONTH?: NO
1. IN THE PAST MONTH, HAVE YOU WISHED YOU WERE DEAD OR WISHED YOU COULD GO TO SLEEP AND NOT WAKE UP?: NO
6. HAVE YOU EVER DONE ANYTHING, STARTED TO DO ANYTHING, OR PREPARED TO DO ANYTHING TO END YOUR LIFE?: NO

## 2024-09-23 NOTE — ED PROVIDER NOTES
"  History     Chief Complaint   Patient presents with    Rib Pain     C/o rt lower rib, rt jaw and rt neck pain since a fall on Thursday. Notes \"passed out when bending down and standing up\". States fell into a counter. Notes slight h/a.     HPI  Scott Akhtar is a 74 year old individual with history of DMT2, hypertension, GERD, lumbar radiculopathy, peripheral artery disease, degenerative scoliosis, celiac disease, comes in for right rib pain and headache after a syncopal episode.  Patient states that he bent over and stood up fast.  Became very lightheaded and passed out.  Patient states that he hit his right ribs and head while passing out on 9/19/2024.  States that he  continues to have some pain in the right neck and right ribs.  Had orthostatic vital signs conducted in the interim and apparently were positive.  States that his doctor advised him to come in and have imaging.    Allergies:  Allergies   Allergen Reactions    Gluten Meal     Pseudoephedrine Hcl      Sudafed     Simvastatin GI Disturbance    Sulfa Antibiotics Nausea    Privet Other (See Comments)     Any Ink at all causes him to sneeze uncontrollable.     Short Ragweed Pollen Ext Cough, Itching, Rash and Difficulty breathing       Problem List:    Patient Active Problem List    Diagnosis Date Noted    Celiac disease 05/01/2024     Priority: Medium    Alcohol use 03/08/2023     Priority: Medium    Abnormal cardiovascular stress test on 7/5/2022 10/24/2022     Priority: Medium    Constipation 10/24/2022     Priority: Medium    History of AAA (abdominal aortic aneurysm) repair 10/24/2022     Priority: Medium    Hypertriglyceridemia 10/24/2022     Priority: Medium    Subacute cutaneous lupus erythematosus-rheum did not feel he had systemic lupus; felt to be related to cutaneous lupus 11/30/2021     Priority: Medium    Presence of right artificial shoulder joint 06/15/2021     Priority: Medium    Adverse effect of antihyperlipidemic drug, sequela " "12/30/2020     Priority: Medium    Hepatic steatosis 08/17/2020     Priority: Medium    Hyperlipidemia associated with type 2 diabetes mellitus (H) 08/03/2020     Priority: Medium    PAD (peripheral artery disease) (H24) 01/31/2020     Priority: Medium    Disorder of joint prosthesis (H24) 02/28/2019     Priority: Medium     Overview:   Added automatically from request for surgery [id number]      Lumbar radiculopathy 10/06/2017     Priority: Medium    History of repair of aneurysm of abdominal aorta using endovascular stent graft 01/26/2017     Priority: Medium    Presence of other vascular implants and grafts 01/26/2017     Priority: Medium    Gout 08/04/2016     Priority: Medium    Obesity with body mass index 30 or greater 07/28/2016     Priority: Medium    Controlled type 2 diabetes mellitus without complication, without long-term current use of insulin (H) 07/27/2016     Priority: Medium     4/15/13:  A1c 6.5  10/13/2020- Diabetes without diabetic retinopathy of both eyes.  Overview:   Overview:   4/15/13:  A1c 6.5      Dermatitis herpetiformis 07/27/2016     Priority: Medium     Overview:   Diagnosed while in the .  Started after visiting [facility] in 1971.  Treats with dapsone four times a week; when he cuts down on the dose he gets a breakout of little water blisters on the backs of his hands, elbows, and knees.  Overview:   Overview:   Diagnosed while in the .  Started after visiting [facility] in 1971.  Treats with dapsone four times a week; when he cuts down on the dose he gets a breakout of little water blisters on the backs of his hands, elbows, and knees.      Essential hypertension 07/27/2016     Priority: Medium    Degenerative scoliosis in adult patient 07/27/2016     Priority: Medium    Sensorineural hearing loss (SNHL) 06/26/2013     Priority: Medium    Diaphragmatic hernia without obstruction or gangrene 12/19/2012     Priority: Medium     Overview:   Overview:   12/19/12, \"small " "esophageal hiatal hernia\" noted on CT abdomen/pelvis.      Gastroesophageal reflux disease without esophagitis 01/13/2005     Priority: Medium        Past Medical History:    Past Medical History:   Diagnosis Date    Aneurysm of thoracic aorta (H24)     Arthritis 2019    Diabetes (H)     External thrombosed hemorrhoids     Hiatal hernia     Hypertension     Need for prophylactic vaccination and inoculation against unspecified single bacterial disease     Other chest pain     Thyroid disease 2022       Past Surgical History:    Past Surgical History:   Procedure Laterality Date    ABDOMINAL AORTIC ANEURYSM REPAIR      BACK SURGERY      laminectomy L5S1    BIOPSY  1971    Dermatitis herpetiformis    COLONOSCOPY      COLONOSCOPY N/A 10/14/2016    Procedure: COLONOSCOPY;  Surgeon: Daljit Salazar MD;  Location: [facility]    ENDOSCOPY UPPER, COLONOSCOPY, COMBINED N/A 4/15/2024    Procedure: Upper endoscopy with biopsy and colonoscopy;  Surgeon: Bello Lentz MD;  Location: [facility]    ESOPHAGOSCOPY, GASTROSCOPY, DUODENOSCOPY (EGD), COMBINED N/A 6/27/2024    Procedure: ESOPHAGOGASTRODUODENOSCOPY WITH BIOPSY;  Surgeon: Bello Lentz MD;  Location: [facility]    EXCISE LESION BACK N/A 01/16/2023    Procedure: EXCISION OF MASSES ON BACK AND LEFT POSTERIOR ARM;  Surgeon: Bello Lentz MD;  Location: [facility]    EYE SURGERY  2007    Lasik    FINGER GANGLION CYST EXCISION      GENITOURINARY SURGERY  1990    Vasectomy    L5 S1 Laminectomy  01/01/1991    ROTATOR CUFF REPAIR RT/LT  01/01/2006    SHOULDER SURGERY Right     replacement    TONSILLECTOMY  01/01/1971    VASCULAR SURGERY      Aortic aneurysm stent       Family History:    Family History   Problem Relation Age of Onset    Depression Mother     Thyroid Disease Mother     Diabetes Father     Prostate Cancer Father     Hypertension Father     Breast Cancer No family hx of     Colon Cancer No family hx of        Social History:  Marital Status:   " [2]  Social History     Tobacco Use    Smoking status: Former     Current packs/day: 0.00     Average packs/day: 1 pack/day for 27.0 years (27.0 ttl pk-yrs)     Types: Cigarettes, Cigars, Pipe     Start date: 1961     Quit date: 3/26/1988     Years since quittin.5     Passive exposure: Past    Smokeless tobacco: Never   Vaping Use    Vaping status: Never Used   Substance Use Topics    Alcohol use: Yes     Comment: 3 beers daily     Drug use: Never     Comment: takes a weekly THC gummy for pain control        Medications:    allopurinol (ZYLOPRIM) 100 MG tablet  ASPIRIN EC PO  blood glucose (NO BRAND SPECIFIED) test strip  Cholecalciferol (VITAMIN D3 PO)  coenzyme Q-10 (CO-Q10) 50 MG capsule  ezetimibe (ZETIA) 10 MG tablet  hydrochlorothiazide (HYDRODIURIL) 25 MG tablet  HYDROcodone-acetaminophen (NORCO) 5-325 MG tablet  ipratropium (ATROVENT) 0.06 % nasal spray  losartan (COZAAR) 25 MG tablet  metFORMIN (GLUCOPHAGE) 500 MG tablet  mometasone (ELOCON) 0.1 % external cream  Multiple Minerals (CALCIUM-MAGNESIUM-ZINC) TABS  nystatin (MYCOSTATIN) 480263 UNIT/GM external powder  Omega-3 Fatty Acids (OMEGA-3 FISH OIL PO)  omeprazole (PRILOSEC) 40 MG DR capsule  ONETOUCH ULTRA test strip  potassium chloride ER (KLOR-CON) 10 MEQ CR tablet  sildenafil (VIAGRA) 100 MG tablet          Review of Systems   Constitutional: Negative.    HENT: Negative.     Eyes: Negative.    Respiratory: Negative.     Cardiovascular: Negative.    Gastrointestinal: Negative.    Endocrine: Negative.    Genitourinary: Negative.    Musculoskeletal:  Positive for neck pain (Right-sided).        Right rib pain   Skin: Negative.    Allergic/Immunologic: Negative.    Neurological:  Positive for syncope. Negative for dizziness, seizures, weakness, light-headedness, numbness and headaches.   Hematological: Negative.    Psychiatric/Behavioral: Negative.         Physical Exam   BP: 134/82  Pulse: 53  Temp: 97.9  F (36.6  C)  Resp: 14  SpO2: 93  %  Lying Orthostatic BP: 134/82  Lying Orthostatic Pulse: 53 bpm  Sitting Orthostatic BP: 133/88  Sitting Orthostatic Pulse: 55 bpm  Standing Orthostatic BP: 133/87  Standing Orthostatic Pulse: 57 bpm      GENERAL APPEARANCE:  The patient is a 74 year old well-developed, well-nourished individual in no acute distress that appears as stated age.  HEENT:  Normocephalic.  No soft spots, indentations, tenderness noted upon palpation of the scalp or face.  No crepitus or deformities noted to face or scalp.  Pupils are equal, round, and reactive to light.  Oropharynx is clear.  No avulsed teeth, buccal or tongue lacerations present.  Voice is clear and without muffling.   No otorrhea or rhinorrhea present.  Negative rojas sign.  Negative for hemotympanum bilaterally.  NECK:  Supple.  Trachea is midline.   CHEST:  Symmetric.  Right lateral rib tenderness to palpation.  No crepitus or deformity.  LUNGS:  Breathing is easy.  Breath sounds are equal and clear bilaterally.  No wheezes, rhonchi, or rales.  HEART: Bradycardic rate with regular rhythm with normal S1 and S2.  No murmurs, gallops, or rubs.  ABDOMEN:  Soft.  No mass, tenderness, guarding, or rebound.  No organomegaly or hernia.  Bowel sounds are present.  No abdominal bruits or thrills present upon auscultation/palpation.  Negative Jacques sign.  Negative Nur Mckoy sign.  MUSCULOSKELETAL:  Normal gait and station.  No cervical/thoracic/lumbar spinal tenderness, step-offs, deformities noted to palpation.  No paraspinal tenderness noted to palpation.  Pelvis stable upon palpation.  No crepitus, deformities, tenderness noted to palpation to the upper or lower extremities to palpation.  Range of motion intact to all joints.  Strength equal bilaterally.  MENTAL STATUS:   The patient was alert and oriented to person, place, time and purpose. Registration and recall intact. No difficulty with concentration.  Spontaneous eye opening.  Follows commands.  GCS 15.   CRANIAL  NERVES:  PERRL. EOMI; no nystagmus.  Full visual fields.  Trapezius and sternocleidomastoid are full strength. Tongue was midline and protrudes midline. Uvula was midline and raises midline. Facial sensation was intact to pain and light touch at all distributions. No speech disturbance. Hearing intact to conversation and whisper.  No facial asymmetry.  SENSORY:  Sensation intact.  PSYCHIATRIC:  Appropriate mood and affect.  Calm and cooperative with history and physical exam.  SKIN:  Warm, dry, and well perfused.  Good turgor.  No lesions, nodules, or rashes are noted.  No bruising noted.       Comment: Discrepancies between my note and notes on behalf of the nursing team or other care providers are secondary to my findings reflecting my physical examination and questioning of the patient.  Any conflicting information provided is not in line with my examination of the patient.       ED Course     ED Course as of 09/23/24 2049   Mon Sep 23, 2024   1853 In to see patient and history/physical completed.    1853 Orthostatic vital signs ordered.  Right rib x-rays ordered.  CT of head and cervical spine ordered.   1904 ECG ordered.   1922 EKG 12-lead, tracing only  No acute findings on ECG.   2000 Orthostatics show no acute abnormality.   2048 Patient doing well with no acute findings on labs or ECG.  No orthostasis noted.  Patient likely had orthostasis and syncopal episode and now has contusions.  Will discharge home to do acetaminophen/ibuprofen and hydration.  Return precautions given.          ECG:    ECG competed at 1918 and personally reviewed at 1922 showing sinus bradycardia with ventricular rate of 54 and QTc of 386.  Normal axis.  Inferior infarct age indeterminant is noted.  Possible anterior infarct age indeterminant noted.  Abnormal ECG.  When compared to ECG from 4/17/2024, no significant changes noted.       Results for orders placed or performed during the hospital encounter of 09/23/24 (from the past 24  hour(s))   EKG 12-lead, tracing only   Result Value Ref Range    Systolic Blood Pressure  mmHg    Diastolic Blood Pressure  mmHg    Ventricular Rate 54 BPM    Atrial Rate 54 BPM    HI Interval 158 ms    QRS Duration 90 ms     ms    QTc 386 ms    P Axis 65 degrees    R AXIS -12 degrees    T Axis 7 degrees    Interpretation ECG       Sinus bradycardia  Inferior infarct , age undetermined  Possible Anterior infarct (cited on or before 17-Apr-2024)  Abnormal ECG  When compared with ECG of 17-Apr-2024 13:28,  Questionable change in initial forces of Anterior leads  Nonspecific T wave abnormality no longer evident in Anterior leads     CBC with platelets   Result Value Ref Range    WBC Count 5.6 4.0 - 11.0 10e3/uL    RBC Count 4.10 (L) 4.40 - 5.90 10e6/uL    Hemoglobin 12.9 (L) 13.3 - 17.7 g/dL    Hematocrit 39.3 (L) 40.0 - 53.0 %    MCV 96 78 - 100 fL    MCH 31.5 26.5 - 33.0 pg    MCHC 32.8 31.5 - 36.5 g/dL    RDW 14.6 10.0 - 15.0 %    Platelet Count 158 150 - 450 10e3/uL   Basic metabolic panel   Result Value Ref Range    Sodium 140 135 - 145 mmol/L    Potassium 4.1 3.4 - 5.3 mmol/L    Chloride 101 98 - 107 mmol/L    Carbon Dioxide (CO2) 26 22 - 29 mmol/L    Anion Gap 13 7 - 15 mmol/L    Urea Nitrogen 14.6 8.0 - 23.0 mg/dL    Creatinine 1.00 0.67 - 1.17 mg/dL    GFR Estimate 79 >60 mL/min/1.73m2    Calcium 9.2 8.8 - 10.4 mg/dL    Glucose 103 (H) 70 - 99 mg/dL   Magnesium   Result Value Ref Range    Magnesium 1.6 (L) 1.7 - 2.3 mg/dL   Troponin T, High Sensitivity   Result Value Ref Range    Troponin T, High Sensitivity 12 <=22 ng/L   Extra Tube    Narrative    The following orders were created for panel order Extra Tube.  Procedure                               Abnormality         Status                     ---------                               -----------         ------                     Extra Blue Top Tube[[id number]]                              In process                 Extra Red Top Tube[[id number]]                                In process                 Extra Green Top (Lithium...[[id number]]                      In process                   Please view results for these tests on the individual orders.       X-ray of chest with right ribs:  No acute fracture.    CT of head without contrast:  No evidence of acute intracranial hemorrhage.    CT of cervical spine without contrast:  Motion limited study but there is no evidence of acute fracture.      Medications - No data to display    Assessments & Plan (with Medical Decision Making)     I have reviewed the nursing notes.    I have reviewed the findings, diagnosis, plan and need for follow up with the patient.    Summary:  Patient presents to the ER today for syncopal episode with complaints of in [place] yesterday headache and rib pain.  Potential diagnosis which have been considered and evaluated include intracerebral bleed, skull fracture, cervical fracture/subluxation, contusions, rib fracture, rib contusion, pneumothorax here, MI/NSTEMI, orthostasis, arrhythmia, as well as others. Many of these have been excluded using the various modalities and assessment as noted on the chart. At the present time, the diagnosis given seems to be the most likely orthostasis with syncope causing rib and head contusions.  Upon arrival, vitals signs are normal.  The patient is alert and oriented.  Trauma examination shows no acute abnormality.  ECG obtained showing no acute abnormality.  Orthostatic vital signs conducted showing no acute changes.  Lab work obtained showing WBC of 5.6 with hemoglobin of 12.9.  Electrolytes and renal functions benign other than magnesium slightly low at 1.6.  High-sensitivity troponin 12 making ACS unlikely as this ha occurred 4 days prior.  X-ray of right ribs personally reviewed showing no fracture or lung abnormality.  CT of head and cervical spine conducted showing no obvious abnormality.  At this time no acute findings noted.  Patient had  syncopal episode 4 days prior and apparent orthostasis noted at home.  At this time no orthostasis noted.  Patient feeling good.  ECG and lab work benign.  Will discharge home to continue hydration therapy.  Acetaminophen/ibuprofen for pain control.  Return to ER if new or worsening symptoms, otherwise follow-up with PCP.  Patient verbalized understanding to plan of care.  Patient discharged home.        Critical Care Time: None    Impression and plan discussed with patient. Questions answered, concerns addressed, indications for urgent re-evaluation reviewed, and  given. Patient/Parent/Caregiver agree with treatment plan and have no further questions at this time.  AVS provided at discharge.    This note was created by the Dragon Voice Dictation System. Inadvertent typographical errors, due to software recognition problems, may still exist.             New Prescriptions    No medications on file       Final diagnoses:   Syncope and collapse   Rib contusion, right, initial encounter   Head contusion       9/23/2024   [facility]       Rick Adamson APRN CNP  09/23/24 2048

## 2024-09-23 NOTE — TELEPHONE ENCOUNTER
Received [employer] message from patient stating patient had fainting episode on Thursday. Writer spoke with patient who states he hit is rib cage and head during fall. Patient states he is unsure how long he was unconscious. Patient reports slight continued dizziness with changing positions. Patient denies shortness of breath, slurred speech, and one sided weakness. Per protocol patient advised to be seen in ED. Patient declined multiple times and asked to speak with Usha for possible appointment. Writer spoke with PCP who advised ED. Writer informed patient of PCP recommendation. Patient in agreement and verbalized understanding.    Reason for Disposition   History of heart problems or congestive heart failure    Additional Information   Negative: Still unconscious   Negative: Still feels dizzy or lightheaded   Negative: Difficult to awaken or acting confused (e.g., disoriented, slurred speech)   Negative: Difficulty breathing   Negative: Bluish (or gray) lips or face   Negative: Shock suspected (e.g., cold/pale/clammy skin, too weak to stand, low BP, rapid pulse)   Negative: Bleeding (e.g., vomiting blood, rectal bleeding or tarry stools, severe vaginal bleeding)   Negative: Chest pain   Negative: Extra heartbeats, irregular heart beating, or heart is beating very fast (i.e., 'palpitations')   Negative: Heart beating < 50 beats per minute OR > 140 beats per minute   Negative: Fainted suddenly after medicine, allergic food or bee sting   Negative: Sounds like a life-threatening emergency to the triager   Negative: Has diabetes (diabetes mellitus) and fainting from low blood glucose (70 mg/dl [3.9 mmol/l] or below)   Negative: Seizure suspected (e.g., muscle jerking or shaking followed by confusion)   Negative: Heat exhaustion suspected (i.e., dehydration from heat exposure)   Negative: Fainted > 15 minutes ago and still looks pale (pale skin, pallor)   Negative: Fainted > 15 minutes ago and still feels weak or  dizzy    Protocols used: [id number]

## 2024-09-24 LAB
ATRIAL RATE - MUSE: 54 BPM
DIASTOLIC BLOOD PRESSURE - MUSE: NORMAL MMHG
INTERPRETATION ECG - MUSE: NORMAL
P AXIS - MUSE: 65 DEGREES
PR INTERVAL - MUSE: 158 MS
QRS DURATION - MUSE: 90 MS
QT - MUSE: 408 MS
QTC - MUSE: 386 MS
R AXIS - MUSE: -12 DEGREES
SYSTOLIC BLOOD PRESSURE - MUSE: NORMAL MMHG
T AXIS - MUSE: 7 DEGREES
VENTRICULAR RATE- MUSE: 54 BPM

## 2024-09-24 NOTE — ED NOTES
09/23/24 1946   Lying Orthostatic BP   Lying Orthostatic /82   Lying Orthostatic Pulse 53 bpm   Sitting Orthostatic BP   Sitting Orthostatic /88   Sitting Orthostatic Pulse 55 bpm   Standing Orthostatic BP   Standing Orthostatic /87   Standing Orthostatic Pulse 57 bpm       Some c/o dizziness when going from sit to stand position.

## 2024-09-24 NOTE — TELEPHONE ENCOUNTER
Patient scheduled to see you for diabetic follow up on 10/21/24. Please advise if you would like to see patient sooner for ED follow up.

## 2024-10-07 DIAGNOSIS — I10 ESSENTIAL HYPERTENSION: ICD-10-CM

## 2024-10-07 RX ORDER — LOSARTAN POTASSIUM 25 MG/1
25 TABLET ORAL DAILY
Qty: 90 TABLET | Refills: 1 | Status: SHIPPED | OUTPATIENT
Start: 2024-10-07 | End: 2024-10-21

## 2024-10-07 NOTE — TELEPHONE ENCOUNTER
losartan (COZAAR) 25 MG tablet         Last Written Prescription Date:  5/1/24  Last Fill Quantity: 90,   # refills: 1  Last Office Visit: 5/1/24  Future Office visit:    Next 5 appointments (look out 90 days)      Oct 21, 2024 3:00 PM  (Arrive by 2:45 PM)  Provider Visit with Usha Gray NP  [facility] ([facility] [place] ) [address]  [place] [address]  [phone]             Routing refill request to provider for review/approval because:  Angiotensin-II Receptors Failed      Last blood pressure under 140/90 in past 12 months        BP Readings from Last 3 Encounters:   09/23/24 144/86   08/27/24 135/82   06/27/24 115/76

## 2024-10-15 NOTE — PROGRESS NOTES
Assessment & Plan     (E11.9) Controlled type 2 diabetes mellitus without complication, without long-term current use of insulin (H)  (primary encounter diagnosis)  Plan: A1c in process. Will notify patient of the results when available and intervene accordingly. Blood pressure at goal. Eye exam UTD. On Zetia, does not tolerate statins. Will reassess in 6 months, sooner if A1c poorly controlled.     (E11.69,  E78.5) Hyperlipidemia associated with type 2 diabetes mellitus (H)  Plan: Tolerating Zetia. Will continue. Does not tolerate statins. AST and ALT normal in 4/2024. Lipids in process. Will notify patient of the results when available and intervene accordingly.     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 Improving. Possible lupus. Will send back to dermatology.     (I10) Essential hypertension  Plan: Blood pressure well controlled, but he feels the losartan is causing dizziness. He notes that he felt much better on hydrochlorothiazide. Will stop the losartan and restart his hydrochlorothiazide at 12.5 mg. Will then see him back in 4 weeks for a recheck.     (R55) Syncope, unspecified syncope type  (R00.1) Bradycardia  (R42) Dizziness  Plan: ZIO PATCH 8-14 DAYS (additional cost to         patient), ZIO PATCH 8-14 DAYS APPLICATION,         CANCELED: ZIO PATCH 8-14 DAYS (additional cost         to patient), CANCELED: ZIO PATCH 8-14 DAYS         APPLICATION        Patient with recent syncope episode. Was evaluated in the ER. Head and neck imaging unremarkable. Continues to feel dizzy. Heart rate often in the 50s. Will repeat labs and order heart monitor. He will also push fluids. Lastly, he feels the losartan is causing his dizziness. Will stop and restart the hydrochlorothiazide as he notes he felt much better on this. Will then reassess in 4 weeks. Sooner with new or worsening symptoms.     (D64.9) Anemia, unspecified type  Comment: recent  "hgb slightly low  Plan: CBC with platelets and differential, Iron and         iron binding capacity, Vitamin B12, Ferritin,         Folate        Will recheck today with additional labs. Will notify patient of the results when available and intervene accordingly. Colonoscopy was done in 4/2024-recommended repeat 5 years.     (E83.42) Hypomagnesemia  Plan: Magnesium        Recent levels low, will recheck today. Will notify patient of the results when available and intervene accordingly.     (B37.2) Candidal intertrigo  Comment: rash in groin folds  Plan: Will try OCT topical antifungal powder as it appears like candida. He was also encouraged to keep the area dry.     (L91.8) Skin tag  Plan: lesion in groin area appears like skin tag-benign, reassured that he does not have a genital wart         MED REC REQUIRED  Post Medication Reconciliation Status:  Discharge medications reconciled, continue medications without change  BMI  Estimated body mass index is 33.36 kg/m  as calculated from the following:    Height as of 8/27/24: 1.753 m (5' 9.02\").    Weight as of this encounter: 102.5 kg (226 lb).   Weight management plan: Discussed healthy diet and exercise guidelines    The longitudinal plan of care for the diagnosis(es)/condition(s) as documented were addressed during this visit. Due to the added complexity in care, I will continue to support Scott in the subsequent management and with ongoing continuity of care.    Return in about 4 weeks (around 11/18/2024).    Frances Chavez is a 74 year old, presenting for the following health issues:  Diabetes, Lipids, Hypertension, and ER F/U    History of Present Illness       Diabetes:   He presents for follow up of diabetes.  He is checking home blood glucose a few times a month.   He checks blood glucose before and after meals.  Blood glucose is never over 200 and never under 70. He is aware of hypoglycemia symptoms including shakiness and dizziness.    He has no " concerns regarding his diabetes at this time.   He is not experiencing numbness or burning in feet, excessive thirst, blurry vision, weight changes or redness, sores or blisters on feet.           Hypertension: He presents for follow up of hypertension.  He does not check blood pressure  regularly outside of the clinic. Outside blood pressures have been over 140/90. He does not follow a low salt diet.     He eats 2-3 servings of fruits and vegetables daily.He consumes 1 sweetened beverage(s) daily.He exercises with enough effort to increase his heart rate 20 to 29 minutes per day.  He exercises with enough effort to increase his heart rate 5 days per week.   He is taking medications regularly.       Diabetes Follow-up    How often are you checking your blood sugar? A few times a month; typically around 120-130  What time of day are you checking your blood sugars (select all that apply)?   Random  Have you had any blood sugars above 200?  No  Have you had any blood sugars below 70?  No  What symptoms do you notice when your blood sugar is low?  None  What concerns do you have today about your diabetes? None   Do you have any of these symptoms? (Select all that apply)  No numbness or tingling in feet.  No redness, sores or blisters on feet.  No complaints of excessive thirst.  No reports of blurry vision.  No significant changes to weight.  A1C was 6.5 on 4/17/24.  Denies chest pain, shortness of breath, or palpitations. Some dizziness-see below.    Taking Metformin 500 mg BID without side effects.   History of alcohol use, currently drinking one beer and one shot nightly.     Due for several vaccines. Declines.     Hyperlipidemia Follow-Up    Are you regularly taking any medication or supplement to lower your cholesterol?   Yes- Zetia, does not tolerate statins  Are you having muscle aches or other side effects that you think could be caused by your cholesterol lowering medication?  No    Hypertension Follow-up    Do  you check your blood pressure regularly outside of the clinic? Yes   Are you following a low salt diet? No  Are your blood pressures ever more than 140 on the top number (systolic) OR more   than 90 on the bottom number (diastolic), for example 140/90? Yes  Taking losartan 25 mg  without side effects. Stopped taking his hydrochlorothiazide after he passed out.     He does have cutaneous lupus. Has not recently seen dermatology. Gets worse during the summer months. Patient does have new rash on legs and right arm. Erythematous and pruritic. No new lotions, soaps, or laundry detergents. No fevers. No recent travel. Wife does not have rash.     He also lesion in groin area. Concerned about warts.     BP Readings from Last 2 Encounters:   10/21/24 124/80   09/23/24 144/86     Hemoglobin A1C (%)   Date Value   04/17/2024 6.5 (H)   09/08/2023 6.9 (H)     LDL Cholesterol Calculated (mg/dL)   Date Value   09/08/2023 71   09/08/2022 63     ED/ Followup:    Facility:  [facility]  Date of visit: 9/23/24  Reason for visit: Syncope, rib contusion      Patient was in the ER on 9/23/24 after passing out. ER note was reviewed. Orthostatic blood pressures were positive. Head and cervical CT were negative. EKG was unremarkable. Hgb was 12.9-mildly low. Magnesium was also low.   Current Status: Today he notes that he continues to feel dizzy. Occurring daily. Occurs when he stands too quickly. Started when he started taking losartan. Would like to stop and restart his hydrochlorothiazide. Felt much better when taking hydrochlorothiazide. No chest pain or shortness of breath. No palpitations. H/O AAA repair. Recent CTA of abdomen without evidence of endoleak. Room does not spin. Rib pain has resolved.       Review of Systems  Constitutional, HEENT, cardiovascular, pulmonary, gi and gu systems are negative, except as otherwise noted.      Objective    /80   Pulse 78   Temp 97.4  F (36.3  C) (Tympanic)   Resp 18   Wt 102.5  kg (226 lb)   SpO2 95%   BMI 33.36 kg/m    Body mass index is 33.36 kg/m .  Physical Exam   GENERAL: alert and no distress, obese  EYES: Eyes grossly normal to inspection, PERRL and conjunctivae and sclerae normal  HENT: ear canals and TM's normal, nose and mouth without ulcers or lesions  NECK: no adenopathy, no asymmetry, masses, or scars  RESP: lungs clear to auscultation - no rales, rhonchi or wheezes  CV: regular rate, irregular rhythm, no murmur, click or rub, no peripheral edema  ABDOMEN: soft, nontender, no hepatosplenomegaly, no masses and bowel sounds normal  MS: no gross musculoskeletal defects noted, no edema  NEURO: Normal strength and tone, mentation intact and speech normal  PSYCH: mentation appears normal, affect normal/bright  Diabetic foot exam: normal DP and PT pulses, no trophic changes or ulcerative lesions, and normal sensory exam  SKIN: erythematous macular papular rash on bilateral lower legs and right arm, no drainage  Genital Region-small skin tag present on right scrotum, also has erythematous macular rash in groin folds-appears like candida   Neurologic: Gait normal. Reflexes normal and symmetric. Sensation grossly WNL. speech normal, mental status intact, cranial nerves 2-12 intact, muscle strength normal, finger to nose normal, reflexes normal and symmetric      Labs in process    EKG; sinus bk with PVC, VR 58, unchanged from previous EKG        Signed Electronically by: Usha Gray NP

## 2024-10-21 ENCOUNTER — OFFICE VISIT (OUTPATIENT)
Dept: FAMILY MEDICINE | Facility: OTHER | Age: 74
End: 2024-10-21
Attending: NURSE PRACTITIONER
Payer: COMMERCIAL

## 2024-10-21 VITALS
RESPIRATION RATE: 18 BRPM | SYSTOLIC BLOOD PRESSURE: 124 MMHG | DIASTOLIC BLOOD PRESSURE: 80 MMHG | OXYGEN SATURATION: 95 % | WEIGHT: 226 LBS | BODY MASS INDEX: 33.36 KG/M2 | HEART RATE: 78 BPM | TEMPERATURE: 97.4 F

## 2024-10-21 DIAGNOSIS — L91.8 SKIN TAG: ICD-10-CM

## 2024-10-21 DIAGNOSIS — E11.9 CONTROLLED TYPE 2 DIABETES MELLITUS WITHOUT COMPLICATION, WITHOUT LONG-TERM CURRENT USE OF INSULIN (H): Primary | ICD-10-CM

## 2024-10-21 DIAGNOSIS — E83.42 HYPOMAGNESEMIA: ICD-10-CM

## 2024-10-21 DIAGNOSIS — R21 RASH: ICD-10-CM

## 2024-10-21 DIAGNOSIS — R42 DIZZINESS: ICD-10-CM

## 2024-10-21 DIAGNOSIS — R00.1 BRADYCARDIA: ICD-10-CM

## 2024-10-21 DIAGNOSIS — D64.9 ANEMIA, UNSPECIFIED TYPE: ICD-10-CM

## 2024-10-21 DIAGNOSIS — L93.1 SUBACUTE CUTANEOUS LUPUS ERYTHEMATOSUS: ICD-10-CM

## 2024-10-21 DIAGNOSIS — B37.2 CANDIDAL INTERTRIGO: ICD-10-CM

## 2024-10-21 DIAGNOSIS — E78.5 HYPERLIPIDEMIA ASSOCIATED WITH TYPE 2 DIABETES MELLITUS (H): ICD-10-CM

## 2024-10-21 DIAGNOSIS — E11.69 HYPERLIPIDEMIA ASSOCIATED WITH TYPE 2 DIABETES MELLITUS (H): ICD-10-CM

## 2024-10-21 DIAGNOSIS — I10 ESSENTIAL HYPERTENSION: ICD-10-CM

## 2024-10-21 DIAGNOSIS — R55 SYNCOPE, UNSPECIFIED SYNCOPE TYPE: ICD-10-CM

## 2024-10-21 LAB
BASOPHILS # BLD AUTO: 0 10E3/UL (ref 0–0.2)
BASOPHILS NFR BLD AUTO: 1 %
CHOLEST SERPL-MCNC: 144 MG/DL
EOSINOPHIL # BLD AUTO: 0.2 10E3/UL (ref 0–0.7)
EOSINOPHIL NFR BLD AUTO: 4 %
ERYTHROCYTE [DISTWIDTH] IN BLOOD BY AUTOMATED COUNT: 14.1 % (ref 10–15)
EST. AVERAGE GLUCOSE BLD GHB EST-MCNC: 134 MG/DL
FERRITIN SERPL-MCNC: 41 NG/ML (ref 31–409)
HBA1C MFR BLD: 6.3 %
HCT VFR BLD AUTO: 41.8 % (ref 40–53)
HDLC SERPL-MCNC: 41 MG/DL
HGB BLD-MCNC: 13.7 G/DL (ref 13.3–17.7)
IMM GRANULOCYTES # BLD: 0 10E3/UL
IMM GRANULOCYTES NFR BLD: 0 %
IRON BINDING CAPACITY (ROCHE): 348 UG/DL (ref 240–430)
IRON SATN MFR SERPL: 21 % (ref 15–46)
IRON SERPL-MCNC: 74 UG/DL (ref 61–157)
LDLC SERPL CALC-MCNC: 80 MG/DL
LYMPHOCYTES # BLD AUTO: 1.1 10E3/UL (ref 0.8–5.3)
LYMPHOCYTES NFR BLD AUTO: 18 %
MAGNESIUM SERPL-MCNC: 1.9 MG/DL (ref 1.7–2.3)
MCH RBC QN AUTO: 31.4 PG (ref 26.5–33)
MCHC RBC AUTO-ENTMCNC: 32.8 G/DL (ref 31.5–36.5)
MCV RBC AUTO: 96 FL (ref 78–100)
MONOCYTES # BLD AUTO: 0.6 10E3/UL (ref 0–1.3)
MONOCYTES NFR BLD AUTO: 9 %
NEUTROPHILS # BLD AUTO: 4.2 10E3/UL (ref 1.6–8.3)
NEUTROPHILS NFR BLD AUTO: 69 %
NONHDLC SERPL-MCNC: 103 MG/DL
NRBC # BLD AUTO: 0 10E3/UL
NRBC BLD AUTO-RTO: 0 /100
PLATELET # BLD AUTO: 173 10E3/UL (ref 150–450)
RBC # BLD AUTO: 4.37 10E6/UL (ref 4.4–5.9)
TRIGL SERPL-MCNC: 115 MG/DL
WBC # BLD AUTO: 6.1 10E3/UL (ref 4–11)

## 2024-10-21 PROCEDURE — 82728 ASSAY OF FERRITIN: CPT | Mod: ZL | Performed by: NURSE PRACTITIONER

## 2024-10-21 PROCEDURE — 83735 ASSAY OF MAGNESIUM: CPT | Mod: ZL | Performed by: NURSE PRACTITIONER

## 2024-10-21 PROCEDURE — 82746 ASSAY OF FOLIC ACID SERUM: CPT | Mod: ZL | Performed by: NURSE PRACTITIONER

## 2024-10-21 PROCEDURE — G2211 COMPLEX E/M VISIT ADD ON: HCPCS | Performed by: NURSE PRACTITIONER

## 2024-10-21 PROCEDURE — 93010 ELECTROCARDIOGRAM REPORT: CPT | Performed by: INTERNAL MEDICINE

## 2024-10-21 PROCEDURE — 85018 HEMOGLOBIN: CPT | Mod: ZL | Performed by: NURSE PRACTITIONER

## 2024-10-21 PROCEDURE — 80061 LIPID PANEL: CPT | Mod: ZL | Performed by: NURSE PRACTITIONER

## 2024-10-21 PROCEDURE — 93005 ELECTROCARDIOGRAM TRACING: CPT | Performed by: NURSE PRACTITIONER

## 2024-10-21 PROCEDURE — 99214 OFFICE O/P EST MOD 30 MIN: CPT | Performed by: NURSE PRACTITIONER

## 2024-10-21 PROCEDURE — 85004 AUTOMATED DIFF WBC COUNT: CPT | Mod: ZL | Performed by: NURSE PRACTITIONER

## 2024-10-21 PROCEDURE — G0463 HOSPITAL OUTPT CLINIC VISIT: HCPCS

## 2024-10-21 PROCEDURE — 83036 HEMOGLOBIN GLYCOSYLATED A1C: CPT | Mod: ZL | Performed by: NURSE PRACTITIONER

## 2024-10-21 PROCEDURE — 83550 IRON BINDING TEST: CPT | Mod: ZL | Performed by: NURSE PRACTITIONER

## 2024-10-21 PROCEDURE — 36415 COLL VENOUS BLD VENIPUNCTURE: CPT | Mod: ZL | Performed by: NURSE PRACTITIONER

## 2024-10-21 PROCEDURE — 82607 VITAMIN B-12: CPT | Mod: ZL | Performed by: NURSE PRACTITIONER

## 2024-10-21 RX ORDER — POTASSIUM CHLORIDE 750 MG/1
10 TABLET, EXTENDED RELEASE ORAL DAILY
Qty: 90 TABLET | Refills: 3 | Status: SHIPPED | OUTPATIENT
Start: 2024-10-21

## 2024-10-21 RX ORDER — HYDROCHLOROTHIAZIDE 25 MG/1
12.5 TABLET ORAL DAILY
Qty: 45 TABLET | Refills: 1 | Status: SHIPPED | OUTPATIENT
Start: 2024-10-21

## 2024-10-21 ASSESSMENT — PAIN SCALES - GENERAL: PAINLEVEL: NO PAIN (0)

## 2024-10-22 ENCOUNTER — ALLIED HEALTH/NURSE VISIT (OUTPATIENT)
Dept: FAMILY MEDICINE | Facility: OTHER | Age: 74
End: 2024-10-22
Attending: NURSE PRACTITIONER
Payer: MEDICARE

## 2024-10-22 DIAGNOSIS — R55 SYNCOPE, UNSPECIFIED SYNCOPE TYPE: ICD-10-CM

## 2024-10-22 DIAGNOSIS — R00.1 BRADYCARDIA: ICD-10-CM

## 2024-10-22 LAB
FOLATE SERPL-MCNC: 6.9 NG/ML (ref 4.6–34.8)
VIT B12 SERPL-MCNC: 200 PG/ML (ref 232–1245)

## 2024-10-22 PROCEDURE — 93246 EXT ECG>7D<15D RECORDING: CPT

## 2024-10-22 NOTE — PROGRESS NOTES
Scott Akhtar arrived here on 10/22/2024 1:15 PM for 8-14 Days  Zio monitor placement per ordering provider Usha Gray for the diagnosis syncope and bradycardia.  Patient s skin was prepped per protocol. Dr. Jones is the supervising MD.  Zio monitor was placed.  Instructions were reviewed with and given to the patient.  Patient verbalized understanding of wear, troubleshooting and monitor return instructions.

## 2024-10-23 LAB
ATRIAL RATE - MUSE: 58 BPM
DIASTOLIC BLOOD PRESSURE - MUSE: NORMAL MMHG
INTERPRETATION ECG - MUSE: NORMAL
P AXIS - MUSE: 19 DEGREES
PR INTERVAL - MUSE: 152 MS
QRS DURATION - MUSE: 88 MS
QT - MUSE: 410 MS
QTC - MUSE: 402 MS
R AXIS - MUSE: -4 DEGREES
SYSTOLIC BLOOD PRESSURE - MUSE: NORMAL MMHG
T AXIS - MUSE: -11 DEGREES
VENTRICULAR RATE- MUSE: 58 BPM

## 2024-12-16 ENCOUNTER — MYC MEDICAL ADVICE (OUTPATIENT)
Dept: FAMILY MEDICINE | Facility: OTHER | Age: 74
End: 2024-12-16

## 2024-12-16 NOTE — LETTER
To Whom it May Concern,          Scott Whitfieldalainen was switched back to Hydrochlorothiazide 12.5 MG from Losartan on 10/21/24 due to dizziness/side effects.                Usha Gray

## 2025-01-08 ENCOUNTER — OFFICE VISIT (OUTPATIENT)
Dept: FAMILY MEDICINE | Facility: OTHER | Age: 75
End: 2025-01-08
Attending: NURSE PRACTITIONER
Payer: MEDICARE

## 2025-01-08 ENCOUNTER — TELEPHONE (OUTPATIENT)
Dept: FAMILY MEDICINE | Facility: OTHER | Age: 75
End: 2025-01-08

## 2025-01-08 VITALS
HEIGHT: 69 IN | HEART RATE: 51 BPM | BODY MASS INDEX: 33.47 KG/M2 | SYSTOLIC BLOOD PRESSURE: 110 MMHG | OXYGEN SATURATION: 95 % | DIASTOLIC BLOOD PRESSURE: 78 MMHG | WEIGHT: 226 LBS | TEMPERATURE: 97.2 F

## 2025-01-08 DIAGNOSIS — Z01.818 PREOP GENERAL PHYSICAL EXAM: Primary | ICD-10-CM

## 2025-01-08 DIAGNOSIS — H25.9 AGE-RELATED CATARACT OF BOTH EYES, UNSPECIFIED AGE-RELATED CATARACT TYPE: ICD-10-CM

## 2025-01-08 PROCEDURE — G0463 HOSPITAL OUTPT CLINIC VISIT: HCPCS

## 2025-01-08 ASSESSMENT — PAIN SCALES - GENERAL: PAINLEVEL_OUTOF10: NO PAIN (0)

## 2025-01-08 NOTE — PATIENT INSTRUCTIONS
How to Take Your Medication Before Surgery  Preoperative Medication Instructions   Antiplatelet or Anticoagulation Medication Instructions   - aspirin: Discontinue aspirin 7 days prior to procedure to reduce bleeding risk. It should be resumed postoperatively.     Additional Medication Instructions  Take all scheduled medications on the day of surgery EXCEPT for modifications listed below:   - Herbal medications and vitamins: DO NOT TAKE 14 days prior to surgery.   - Diuretics (furosemide, hydrochlorothiazide, chlorothalidone): DO NOT TAKE on the day of surgery.   - hold Zetia day of surgery    - metformin: DO NOT TAKE day of surgery.    Hold vitamins 2 weeks before each procedure   Hold Asprin 7 days before each procedure   Hold metformin day of procedure  Hold hydrochlorothiazide and potassium day of procedure       Patient Education   Preparing for Your Surgery  For Adults  Getting started  In most cases, a nurse will call to review your health history and instructions. They will give you an arrival time based on your scheduled surgery time. Please be ready to share:  Your doctor's clinic name and phone number  Your medical, surgical, and anesthesia history  A list of allergies and sensitivities  A list of medicines, including herbal treatments and over-the-counter drugs  Whether the patient has a legal guardian (ask how to send us the papers in advance)  Note: You may not receive a call if you were seen at our PAC (Preoperative Assessment Center).  Please tell us if you're pregnant--or if there's any chance you might be pregnant. Some surgeries may injure a fetus (unborn baby), so they require a pregnancy test. Surgeries that are safe for a fetus don't always need a test, and you can choose whether to have one.   Preparing for surgery  Within 10 to 30 days of surgery: Have a pre-op exam (sometimes called an H&P, or History and Physical). This can be done at a clinic or pre-operative center.  If you're having a  , you may not need this exam. Talk to your care team.  At your pre-op exam, talk to your care team about all medicines you take. (This includes CBD oil and any drugs, such as THC, marijuana, and other forms of cannabis.) If you need to stop any medicine before surgery, ask when to start taking it again.  This is for your safety. Many medicines and drugs can make you bleed too much during surgery. Some change how well surgery (anesthesia) drugs work.  Call your insurance company to let them know you're having surgery. (If you don't have insurance, call [phone].)  Call your clinic if there's any change in your health. This includes a scrape or scratch near the surgery site, or any signs of a cold (sore throat, runny nose, cough, rash, fever).  Eating and drinking guidelines  For your safety: Unless your surgeon tells you otherwise, follow the guidelines below.  Eat and drink as normal until 8 hours before you arrive for surgery. After that, no food or milk. You can spit out gum when you arrive.  Drink clear liquids until 2 hours before you arrive. These are liquids you can see through, like water, Gatorade, and Propel Water. They also include plain black coffee and tea (no cream or milk).  No alcohol for 24 hours before you arrive. The night before surgery, stop any drinks that contain THC.  If your care team tells you to take medicine on the morning of surgery, it's okay to take it with a sip of water. No other medicines or drugs are allowed (including CBD oil)--follow your care team's instructions.  If you have questions the day of surgery, call your hospital or surgery center.   Preventing infection  Shower or bathe the night before and the morning of surgery. Follow the instructions your clinic gave you. (If no instructions, use regular soap.)  Don't shave or clip hair near your surgery site. We'll remove the hair if needed.  Don't smoke or vape the morning of surgery. No chewing tobacco for 6 hours  before you arrive. A nicotine patch is okay. You may spit out nicotine gum when you arrive.  For some surgeries, the surgeon will tell you to fully quit smoking and nicotine.  We will make every effort to keep you safe from infection. We will:  Clean our hands often with soap and water (or an alcohol-based hand rub).  Clean the skin at your surgery site with a special soap that kills germs.  Give you a special gown to keep you warm. (Cold raises the risk of infection.)  Wear hair covers, masks, gowns, and gloves during surgery.  Give antibiotic medicine, if prescribed. Not all surgeries need this medicine.  What to bring on the day of surgery  Photo ID and insurance card  Copy of your health care directive, if you have one  Glasses and hearing aids (bring cases)  You can't wear contacts during surgery  Inhaler and eye drops, if you use them (tell us about these when you arrive)  CPAP machine or breathing device, if you use them  A few personal items, if spending the night  If you have . . .  A pacemaker, ICD (cardiac defibrillator), or other implant: Bring the ID card.  An implanted stimulator: Bring the remote control.  A legal guardian: Bring a copy of the certified (court-stamped) guardianship papers.  Please remove any jewelry, including body piercings. Leave jewelry and other valuables at home.  If you're going home the day of surgery  You must have a responsible adult drive you home. They should stay with you overnight as well.  If you don't have someone to stay with you, and you aren't safe to go home alone, we may keep you overnight. Insurance often won't pay for this.  After surgery  If it's hard to control your pain or you need more pain medicine, please call your surgeon's office.  Questions?   If you have any questions for your care team, list them here:    ____________________________________________________________________________________________________________________________________________________________________________________________________________________________________________________________  For informational purposes only. Not to replace the advice of your health care provider. Copyright   2003, 2019 [facility]. All rights reserved. Clinically reviewed by Raul Mccain MD. [employer] [id number] - REV 08/24.

## 2025-01-08 NOTE — PROGRESS NOTES
Preoperative Evaluation  [facility] [place]  [address]  [place] [address]  Phone: [phone]  Primary Provider: Usha Gray NP  Pre-op Performing Provider: LACHO Sidhu CNP  Jan 8, 2025             1/3/2025   Surgical Information   What procedure is being done? Catarac surgery   Facility or Hospital where procedure/surgery will be performed: [facility]   Who is doing the procedure / surgery? [facility]   Date of surgery / procedure: 1/14/25  & 1/16/25   Time of surgery / procedure: Tbd    Where do you plan to recover after surgery? at home with family     Fax number for surgical facility: [phone] & [phone]    Assessment & Plan     The proposed surgical procedure is considered LOW risk.    Preop general physical exam  Age-related cataract of both eyes, unspecified age-related cataract type  Cleared for procedure     Prescription drug management          - No identified additional risk factors other than previously addressed    Preoperative Medication Instructions  Antiplatelet or Anticoagulation Medication Instructions   - aspirin: Discontinue aspirin 7 days prior to procedure to reduce bleeding risk. It should be resumed postoperatively.     Additional Medication Instructions  Take all scheduled medications on the day of surgery EXCEPT for modifications listed below:   - Herbal medications and vitamins: DO NOT TAKE 14 days prior to surgery.   - Diuretics (furosemide, hydrochlorothiazide, chlorothalidone): DO NOT TAKE on the day of surgery.   - hold Zetia day of surgery    - metformin: DO NOT TAKE day of surgery.    Hold vitamins 2 weeks before each procedure   Hold Asprin 7 days before each procedure   Hold metformin day of procedure  Hold hydrochlorothiazide and potassium day of procedure    Recommendation  Approval given to proceed with proposed procedure, without further diagnostic evaluation.    Frances Chavez is a 74 year old, presenting for the  following:  Pre-Op Exam          1/8/2025     8:47 AM   Additional Questions   Roomed by juan francisco castro   Accompanied by self         1/8/2025     8:47 AM   Patient Reported Additional Medications   Patient reports taking the following new medications none     Via the Health Maintenance questionnaire, the patient has reported the following services have been completed -Eye Exam: [facility] 2024-12-23, this information has been sent to the abstraction team.  HPI related to upcoming procedure: due to cataracts         1/3/2025   Pre-Op Questionnaire   Have you ever had a heart attack or stroke? (!) YES  has had 2 mild heart attacks    Have you ever had surgery on your heart or blood vessels, such as a stent placement, a coronary artery bypass, or surgery on an artery in your head, neck, heart, or legs? (!) YES had a AAA repair, and AAA minsk procedure   Do you have chest pain with activity? No   Do you have a history of heart failure? No   Do you currently have a cold, bronchitis or symptoms of other infection? No   Do you have a cough, shortness of breath, or wheezing? No   Do you or anyone in your family have previous history of blood clots? No   Do you or does anyone in your family have a serious bleeding problem such as prolonged bleeding following surgeries or cuts? No   Have you ever had problems with anemia or been told to take iron pills? No   Have you had any abnormal blood loss such as black, tarry or bloody stools? No   Have you ever had a blood transfusion? No   Are you willing to have a blood transfusion if it is medically needed before, during, or after your surgery? Yes   Have you or any of your relatives ever had problems with anesthesia? No   Do you have sleep apnea, excessive snoring or daytime drowsiness? No   Do you have any artifical heart valves or other implanted medical devices like a pacemaker, defibrillator, or continuous glucose monitor? No   Do you have artificial joints? (!) YES - right total  shoulder   Are you allergic to latex? No     Health Care Directive  Patient has a Health Care Directive on file      Preoperative Review of    reviewed - no record of controlled substances prescribed.      Status of Chronic Conditions:  See problem list for active medical problems.  Problems all longstanding and stable, except as noted/documented.  See ROS for pertinent symptoms related to these conditions.    DIABETES - Patient has a longstanding history of DiabetesType Type II . Patient is being treated with diet, oral agents, exercise, and ASA and denies significant side effects. Control has been good. Complicating factors include but are not limited to: hypertension and hyperlipidemia.     HYPERLIPIDEMIA - Patient has a long history of significant Hyperlipidemia requiring medication for treatment with recent fair control. Patient reports no problems or side effects with the medication.     HYPERTENSION - Patient has longstanding history of HTN , currently denies any symptoms referable to elevated blood pressure. Specifically denies chest pain, palpitations, dyspnea, orthopnea, PND or peripheral edema. Blood pressure readings have been in normal range. Current medication regimen is as listed below. Patient denies any side effects of medication.     Patient Active Problem List    Diagnosis Date Noted    Celiac disease 05/01/2024     Priority: Medium    Alcohol use 03/08/2023     Priority: Medium    Abnormal cardiovascular stress test on 7/5/2022 10/24/2022     Priority: Medium    Constipation 10/24/2022     Priority: Medium    History of AAA (abdominal aortic aneurysm) repair 10/24/2022     Priority: Medium    Hypertriglyceridemia 10/24/2022     Priority: Medium    Subacute cutaneous lupus erythematosus-rheum did not feel he had systemic lupus; felt to be related to cutaneous lupus 11/30/2021     Priority: Medium    Presence of right artificial shoulder joint 06/15/2021     Priority: Medium    Adverse effect  of antihyperlipidemic drug, sequela 12/30/2020     Priority: Medium    Hepatic steatosis 08/17/2020     Priority: Medium    Hyperlipidemia associated with type 2 diabetes mellitus (H) 08/03/2020     Priority: Medium    PAD (peripheral artery disease) 01/31/2020     Priority: Medium    Disorder of joint prosthesis 02/28/2019     Priority: Medium     Overview:   Added automatically from request for surgery [id number]      Lumbar radiculopathy 10/06/2017     Priority: Medium    History of repair of aneurysm of abdominal aorta using endovascular stent graft 01/26/2017     Priority: Medium    Presence of other vascular implants and grafts 01/26/2017     Priority: Medium    Gout 08/04/2016     Priority: Medium    Obesity with body mass index 30 or greater 07/28/2016     Priority: Medium    Controlled type 2 diabetes mellitus without complication, without long-term current use of insulin (H) 07/27/2016     Priority: Medium     4/15/13:  A1c 6.5  10/13/2020- Diabetes without diabetic retinopathy of both eyes.  Overview:   Overview:   4/15/13:  A1c 6.5      Dermatitis herpetiformis 07/27/2016     Priority: Medium     Overview:   Diagnosed while in the .  Started after visiting [facility] in 1971.  Treats with dapsone four times a week; when he cuts down on the dose he gets a breakout of little water blisters on the backs of his hands, elbows, and knees.  Overview:   Overview:   Diagnosed while in the .  Started after visiting [facility] in 1971.  Treats with dapsone four times a week; when he cuts down on the dose he gets a breakout of little water blisters on the backs of his hands, elbows, and knees.      Essential hypertension 07/27/2016     Priority: Medium    Degenerative scoliosis in adult patient 07/27/2016     Priority: Medium    Sensorineural hearing loss (SNHL) 06/26/2013     Priority: Medium    Diaphragmatic hernia without obstruction or gangrene 12/19/2012     Priority: Medium     Overview:   Overview:  "  12/19/12, \"small esophageal hiatal hernia\" noted on CT abdomen/pelvis.      Gastroesophageal reflux disease without esophagitis 01/13/2005     Priority: Medium      Past Medical History:   Diagnosis Date    Aneurysm of thoracic aorta     Arthritis 2019    Diabetes (H)     External thrombosed hemorrhoids     Hiatal hernia     Hypertension     Need for prophylactic vaccination and inoculation against unspecified single bacterial disease     Other chest pain     Thyroid disease 2022    Benign biopsy     Past Surgical History:   Procedure Laterality Date    ABDOMINAL AORTIC ANEURYSM REPAIR      BACK SURGERY      laminectomy L5S1    BIOPSY  1971    Dermatitis herpetiformis    COLONOSCOPY      COLONOSCOPY N/A 10/14/2016    Procedure: COLONOSCOPY;  Surgeon: Daljit Salazar MD;  Location: [facility]    ENDOSCOPY UPPER, COLONOSCOPY, COMBINED N/A 4/15/2024    Procedure: Upper endoscopy with biopsy and colonoscopy;  Surgeon: Bello Lentz MD;  Location: [facility]    ESOPHAGOSCOPY, GASTROSCOPY, DUODENOSCOPY (EGD), COMBINED N/A 6/27/2024    Procedure: ESOPHAGOGASTRODUODENOSCOPY WITH BIOPSY;  Surgeon: Bello Lentz MD;  Location: [facility]    EXCISE LESION BACK N/A 01/16/2023    Procedure: EXCISION OF MASSES ON BACK AND LEFT POSTERIOR ARM;  Surgeon: Bello Lentz MD;  Location: [facility]    EYE SURGERY  2007    Lasik    FINGER GANGLION CYST EXCISION      GENITOURINARY SURGERY  1990    Vasectomy    L5 S1 Laminectomy  01/01/1991    ROTATOR CUFF REPAIR RT/LT  01/01/2006    SHOULDER SURGERY Right     replacement    TONSILLECTOMY  01/01/1971    VASCULAR SURGERY      Aortic aneurysm stent     Current Outpatient Medications   Medication Sig Dispense Refill    allopurinol (ZYLOPRIM) 100 MG tablet Take 1 tablet (100 mg) by mouth 2 times daily 180 tablet 3    ASPIRIN EC PO Take 81 mg by mouth daily      blood glucose (NO BRAND SPECIFIED) test strip 1 strip by In Vitro route daily Use to test blood sugar 1 times daily or as " directed. 100 strip 3    Cholecalciferol (VITAMIN D3 PO) Take 5,000 Units by mouth daily 2000 to 5000      coenzyme Q-10 (CO-Q10) 50 MG capsule Take 2 capsules (100 mg) by mouth daily (Patient taking differently: Take 100 mg by mouth daily. 1 tablet daily) 180 capsule 0    ezetimibe (ZETIA) 10 MG tablet Take 1 tablet (10 mg) by mouth daily 90 tablet 3    hydrochlorothiazide (HYDRODIURIL) 25 MG tablet Take 0.5 tablets (12.5 mg) by mouth daily. 45 tablet 1    ipratropium (ATROVENT) 0.06 % nasal spray Spray 2 sprays into both nostrils 3 times daily as needed for rhinitis. 15 mL 11    metFORMIN (GLUCOPHAGE) 500 MG tablet Take 1 tablet (500 mg) by mouth 2 times daily (with meals). 180 tablet 3    mometasone (ELOCON) 0.1 % external cream Apply topically daily. 15 g 3    Multiple Minerals (CALCIUM-MAGNESIUM-ZINC) TABS Take 1 tablet by mouth daily      nystatin (MYCOSTATIN) 732654 UNIT/GM external powder Apply topically 4 times daily 60 g 1    Omega-3 Fatty Acids (OMEGA-3 FISH OIL PO) Take 1,200 mg by mouth daily      omeprazole (PRILOSEC) 40 MG DR capsule Take 1 capsule (40 mg) by mouth 2 times daily 60 capsule 11    potassium chloride ER (KLOR-CON) 10 MEQ CR tablet Take 1 tablet (10 mEq) by mouth daily. 90 tablet 3       Allergies   Allergen Reactions    Gluten Meal     Losartan      dizziness    Pseudoephedrine Hcl      Sudafed     Simvastatin GI Disturbance    Sulfa Antibiotics Nausea    Privet Other (See Comments)     Any Ink at all causes him to sneeze uncontrollable.     Short Ragweed Pollen Ext Cough, Itching, Rash and Difficulty breathing        Social History     Tobacco Use    Smoking status: Former     Current packs/day: 0.00     Average packs/day: 1 pack/day for 27.0 years (27.0 ttl pk-yrs)     Types: Cigarettes, Cigars, Pipe     Start date: 1961     Quit date: 3/26/1988     Years since quittin.8     Passive exposure: Past    Smokeless tobacco: Never   Substance Use Topics    Alcohol use: Yes      "Comment: 3 beers daily      Family History   Problem Relation Age of Onset    Depression Mother     Thyroid Disease Mother     Diabetes Father     Prostate Cancer Father     Hypertension Father     Breast Cancer No family hx of     Colon Cancer No family hx of      History   Drug Use Unknown     Comment: takes a weekly THC gummy for pain control             Review of Systems  CONSTITUTIONAL: NEGATIVE for fever, chills, change in weight  INTEGUMENTARY/SKIN: lupus  EYES: NEGATIVE for vision changes or irritation  ENT/MOUTH: NEGATIVE for ear, mouth and throat problems  RESP: NEGATIVE for significant cough or SOB  CV: NEGATIVE for chest pain, palpitations or peripheral edema  GI: NEGATIVE for nausea, abdominal pain, heartburn, or change in bowel habits  : negative for dysuria, hematuria, decreased urinary stream,  MUSCULOSKELETAL: NEGATIVE for significant arthralgias or myalgia  NEURO: NEGATIVE for weakness, dizziness or paresthesias  ENDOCRINE: Hx diabetes  PSYCHIATRIC: NEGATIVE for changes in mood or affect    Objective    /78 (BP Location: Right arm, Patient Position: Left side, Cuff Size: Adult Large)   Pulse 51   Temp 97.2  F (36.2  C) (Tympanic)   Ht 1.753 m (5' 9.02\")   Wt 102.5 kg (226 lb)   SpO2 95%   BMI 33.36 kg/m     Estimated body mass index is 33.36 kg/m  as calculated from the following:    Height as of this encounter: 1.753 m (5' 9.02\").    Weight as of this encounter: 102.5 kg (226 lb).  Physical Exam  GENERAL: alert and no distress  EYES: Eyes grossly normal to inspection, PERRL and conjunctivae and sclerae normal  HENT: ear canals and TM's normal, nose and mouth without ulcers or lesions  NECK: no adenopathy, no asymmetry, masses, or scars  RESP: lungs clear to auscultation - no rales, rhonchi or wheezes  CV: regular rate and rhythm, normal S1 S2, no S3 or S4, no murmur, click or rub, no peripheral edema  ABDOMEN: soft, nontender, no hepatosplenomegaly, no masses and bowel sounds " normal  MS: no gross musculoskeletal defects noted, no edema  SKIN: no suspicious lesions or rashes  NEURO: Normal strength and tone, mentation intact and speech normal  PSYCH: mentation appears normal, affect normal/bright  LYMPH: normal ant/post cervical, supraclavicular nodes    Recent Labs   Lab Test 11/22/24  1404 10/21/24  1536 09/23/24 2005 05/01/24  1434 04/17/24  1432   HGB  --  13.7 12.9*  --   --    PLT  --  173 158  --   --      --  140   < > 141   POTASSIUM 4.0  --  4.1   < > 4.0   CR 1.00  --  1.00   < > 0.86   A1C  --  6.3*  --   --  6.5*    < > = values in this interval not displayed.        Diagnostics  No labs were ordered during this visit.   No EKG required for low risk surgery (cataract, skin procedure, breast biopsy, etc).    Revised Cardiac Risk Index (RCRI)  The patient has the following serious cardiovascular risks for perioperative complications:   - No serious cardiac risks = 0 points     RCRI Interpretation: 0 points: Class I (very low risk - 0.4% complication rate)         Signed Electronically by: LACHO Sidhu CNP  A copy of this evaluation report is provided to the requesting physician.

## 2025-01-09 ENCOUNTER — TELEPHONE (OUTPATIENT)
Dept: FAMILY MEDICINE | Facility: OTHER | Age: 75
End: 2025-01-09

## 2025-02-25 ENCOUNTER — OFFICE VISIT (OUTPATIENT)
Dept: OTOLARYNGOLOGY | Facility: OTHER | Age: 75
End: 2025-02-25
Attending: NURSE PRACTITIONER
Payer: MEDICARE

## 2025-02-25 VITALS
HEIGHT: 69 IN | SYSTOLIC BLOOD PRESSURE: 131 MMHG | HEART RATE: 58 BPM | RESPIRATION RATE: 16 BRPM | DIASTOLIC BLOOD PRESSURE: 86 MMHG | TEMPERATURE: 98 F | WEIGHT: 226 LBS | OXYGEN SATURATION: 95 % | BODY MASS INDEX: 33.47 KG/M2

## 2025-02-25 DIAGNOSIS — E04.1 THYROID NODULE: ICD-10-CM

## 2025-02-25 DIAGNOSIS — H61.23 BILATERAL IMPACTED CERUMEN: Primary | ICD-10-CM

## 2025-02-25 DIAGNOSIS — H90.3 SENSORINEURAL HEARING LOSS (SNHL) OF BOTH EARS: ICD-10-CM

## 2025-02-25 PROCEDURE — 99212 OFFICE O/P EST SF 10 MIN: CPT | Mod: 25 | Performed by: NURSE PRACTITIONER

## 2025-02-25 PROCEDURE — 69210 REMOVE IMPACTED EAR WAX UNI: CPT | Performed by: NURSE PRACTITIONER

## 2025-02-25 PROCEDURE — 3079F DIAST BP 80-89 MM HG: CPT | Performed by: NURSE PRACTITIONER

## 2025-02-25 PROCEDURE — 1126F AMNT PAIN NOTED NONE PRSNT: CPT | Performed by: NURSE PRACTITIONER

## 2025-02-25 PROCEDURE — G0463 HOSPITAL OUTPT CLINIC VISIT: HCPCS

## 2025-02-25 PROCEDURE — 3075F SYST BP GE 130 - 139MM HG: CPT | Performed by: NURSE PRACTITIONER

## 2025-02-25 ASSESSMENT — PAIN SCALES - GENERAL: PAINLEVEL_OUTOF10: NO PAIN (0)

## 2025-02-25 NOTE — PATIENT INSTRUCTIONS
Follow for US of thyroid on 4/14 at 12:45 pm  Follow up in ENT in 6 months   Consider trying Astelin nasal spray in the future if drainage remains and no improvement with flonase  Use ipratropium for runny nose - this will help dry up the nose       Thank you for allowing Shanice Robledo and our ENT team to participate in your care.  If your medications are too expensive, please give the nurse a call.  We can possibly change this medication.  If you have a scheduling or an appointment question please contact our Health Unit Coordinator at their direct line [phone] ext [phone].   ALL nursing questions or concerns can be directed to your ENT nurse at: [phone] - [place]

## 2025-02-25 NOTE — PROGRESS NOTES
Otolaryngology Note         Chief Complaint:     Patient presents with:  Follow Up: Ear recheck 6 months           History of Present Illness:     Scott Akhtar is a 74 year old male who presents today for ear cleaning.      He was last seen in ENT on 8/27/2024 for ear cleaning.  History of ear itching that improved with the use of mometasone.    He denies acute changes in hearing  No bothersome tinnitus  No vertigo, facial numbness or weakness.      No otalgia, otorrhea.    No hx of COM or otologic surgeries.     Syncopal episode in September, was seen in the emergency department and followed up with primary care, Zio patch was completed and he saw vascular surgery for an aortic aneurysm.    Audiogram completed 10/14/24:    Assessment:  Otoscopic exam:  Right: Canal clear and tympanic membrane visualized.  Left: Canal clear and tympanic membrane visualized.    Tympanometry:  Right: Normal middle ear mobility and ear canal volume.  Left: Normal middle ear mobility and ear canal volume.    Pure tone testing:  Today's evaluation was conducted using conventional audiometry under insert earphones with good reliability.   Right: Mild sloping to moderately severe sensorineural hearing loss  Left: Mild sloping to moderately severe sensorineural hearing loss    Speech reception thresholds results were 35 dB in the right ear and 30 dB in the left ear. SRTs were in good agreement with pure tone measures.  Word recognition score: Presented at levels of a shout.  Right: Excellent (100% @ 75 dB)  Left: Excellent (96% @ 70 dB)     History of left and right thyroid nodule with previous FNA x 2 negative completed 11/10/2022.  He is due for annual thyroid ultrasound, this is scheduled for April.    Thyroid ultrasound completed 4/17/2024:  FINDINGS: The right lobe of the thyroid measures 5.4 x 2.4 x 1.9 cm.  The left lobe of the thyroid measures 4.3 x 1.4 x 1.3 cm.     The complex nodule seen previously in the left lobe of the  thyroid is  no longer visualized. In the right lobe of the thyroid is a 1 x 0.9 x  1 cm in diameter nodule. This nodule has decreased in size from  previous examination. The Tirads score is 4                                                                        IMPRESSION: Continued yearly ultrasound follow-up is recommended            Medications:     Current Outpatient Rx   Medication Sig Dispense Refill    allopurinol (ZYLOPRIM) 100 MG tablet Take 1 tablet (100 mg) by mouth 2 times daily 180 tablet 3    ASPIRIN EC PO Take 81 mg by mouth daily      blood glucose (NO BRAND SPECIFIED) test strip 1 strip by In Vitro route daily Use to test blood sugar 1 times daily or as directed. 100 strip 3    Cholecalciferol (VITAMIN D3 PO) Take 5,000 Units by mouth daily 2000 to 5000      coenzyme Q-10 (CO-Q10) 50 MG capsule Take 2 capsules (100 mg) by mouth daily (Patient taking differently: Take 100 mg by mouth daily. 1 tablet daily) 180 capsule 0    ezetimibe (ZETIA) 10 MG tablet Take 1 tablet (10 mg) by mouth daily 90 tablet 3    hydrochlorothiazide (HYDRODIURIL) 25 MG tablet Take 0.5 tablets (12.5 mg) by mouth daily. 45 tablet 1    ipratropium (ATROVENT) 0.06 % nasal spray Spray 2 sprays into both nostrils 3 times daily as needed for rhinitis. 15 mL 11    metFORMIN (GLUCOPHAGE) 500 MG tablet Take 1 tablet (500 mg) by mouth 2 times daily (with meals). 180 tablet 3    mometasone (ELOCON) 0.1 % external cream Apply topically daily. 15 g 3    Multiple Minerals (CALCIUM-MAGNESIUM-ZINC) TABS Take 1 tablet by mouth daily      nystatin (MYCOSTATIN) 805788 UNIT/GM external powder Apply topically 4 times daily 60 g 1    Omega-3 Fatty Acids (OMEGA-3 FISH OIL PO) Take 1,200 mg by mouth daily      omeprazole (PRILOSEC) 40 MG DR capsule Take 1 capsule (40 mg) by mouth 2 times daily 60 capsule 11    potassium chloride ER (KLOR-CON) 10 MEQ CR tablet Take 1 tablet (10 mEq) by mouth daily. 90 tablet 3            Allergies:     Allergies:  Gluten meal, Losartan, Pseudoephedrine hcl, Simvastatin, Sulfa antibiotics, Privet, and Short ragweed pollen ext          Past Medical History:     Past Medical History:   Diagnosis Date    Aneurysm of thoracic aorta     Arthritis 2019    Diabetes (H)     External thrombosed hemorrhoids     Hiatal hernia     Hypertension     Need for prophylactic vaccination and inoculation against unspecified single bacterial disease     Other chest pain     Thyroid disease 2022    Benign biopsy            Past Surgical History:     Past Surgical History:   Procedure Laterality Date    ABDOMINAL AORTIC ANEURYSM REPAIR      BACK SURGERY      laminectomy L5S1    BIOPSY  1971    Dermatitis herpetiformis    COLONOSCOPY      COLONOSCOPY N/A 10/14/2016    Procedure: COLONOSCOPY;  Surgeon: Daljit Salazar MD;  Location: [facility]    ENDOSCOPY UPPER, COLONOSCOPY, COMBINED N/A 4/15/2024    Procedure: Upper endoscopy with biopsy and colonoscopy;  Surgeon: Bello Lentz MD;  Location: [facility]    ESOPHAGOSCOPY, GASTROSCOPY, DUODENOSCOPY (EGD), COMBINED N/A 6/27/2024    Procedure: ESOPHAGOGASTRODUODENOSCOPY WITH BIOPSY;  Surgeon: Bello Lentz MD;  Location: [facility]    EXCISE LESION BACK N/A 01/16/2023    Procedure: EXCISION OF MASSES ON BACK AND LEFT POSTERIOR ARM;  Surgeon: Bello Lentz MD;  Location: [facility]    EYE SURGERY  2007    Lasik    FINGER GANGLION CYST EXCISION      GENITOURINARY SURGERY  1990    Vasectomy    L5 S1 Laminectomy  01/01/1991    ROTATOR CUFF REPAIR RT/LT  01/01/2006    SHOULDER SURGERY Right     replacement    TONSILLECTOMY  01/01/1971    VASCULAR SURGERY      Aortic aneurysm stent       ENT family history reviewed         Social History:     Social History     Tobacco Use    Smoking status: Former     Current packs/day: 0.00     Average packs/day: 1 pack/day for 27.0 years (27.0 ttl pk-yrs)     Types: Cigarettes, Cigars, Pipe     Start date: 6/1/1961     Quit date: 3/26/1988     Years since  "quittin.9     Passive exposure: Past    Smokeless tobacco: Never   Vaping Use    Vaping status: Never Used   Substance Use Topics    Alcohol use: Yes     Comment: 3 beers daily     Drug use: Never     Comment: takes a weekly THC gummy for pain control            Review of Systems:     ROS: See HPI         Physical Exam:     /86 (BP Location: Right arm, Patient Position: Sitting, Cuff Size: Adult Large)   Pulse 58   Temp 98  F (36.7  C) (Tympanic)   Resp 16   Ht 1.753 m (5' 9.02\")   Wt 102.5 kg (226 lb)   SpO2 95%   BMI 33.36 kg/m      General - The patient is well nourished and well developed, and appears to have good nutritional status.  Alert and oriented to person and place, answers questions and cooperates with examination appropriately.   Head and Face - Normocephalic and atraumatic, with no gross asymmetry noted.  The facial nerve is intact, with strong symmetric movements.  Voice and Breathing - The patient was breathing comfortably without the use of accessory muscles. There was no wheezing, stridor. The patients voice was clear and strong, and had appropriate pitch and quality.  Ears - The ears were examined with binocular microscopy and with otoscope.  External ears normal. Right canal cerumen impacted.  Left canal cerumen impacted.  The right ear was cleaned with gentle suctioning using #7, #5 [place].   Right tympanic membrane is intact without effusion, retraction or mass. The left ear was cleaned in the same manner.  Left tympanic membrane is intact without effusion, retraction or mass.  Eyes - Extraocular movements intact, sclera were not icteric or injected, conjunctiva were pink and moist.  Mouth - Examination of the oral cavity showed pink, healthy oral mucosa. Dentition in good condition. No lesions or ulcerations noted. The tongue was mobile and midline.   Throat - The walls of the oropharynx were smooth, pink, moist, symmetric, and had no lesions or ulcerations.  The tonsillar " pillars and soft palate were symmetric. The uvula was midline on elevation.    Neck - Full range of motion on passive movement.  Palpation of the occipital, submental, submandibular, internal jugular chain, and supraclavicular nodes did not demonstrate any abnormal lymph nodes or masses.    Nose - External contour is symmetric, no gross deflection or scars.  Nasal mucosa is pink and moist with no abnormal mucus.  The septum and turbinates were evaluated with nasal speculum, no polyps, masses, or purulence noted on examination.         Assessment and Plan:       ICD-10-CM    1. Bilateral impacted cerumen  H61.23       2. Sensorineural hearing loss (SNHL) of both ears  H90.3         Follow for US of thyroid in April  Follow up in ENT in 6 months   Consider trying Astelin nasal spray in the future if drainage remains and no improvement with flonase  Use ipratropium for runny nose - this will help dry up the nose     Shanice Robledo NPRaheelC  [facility]

## 2025-02-25 NOTE — LETTER
2/25/2025      Scott Akhtar  [address] [facility] [address]      Dear Colleague,    Thank you for referring your patient, Scott Akhtar, to the [facility]. Please see a copy of my visit note below.    Otolaryngology Note         Chief Complaint:     Patient presents with:  Follow Up: Ear recheck 6 months           History of Present Illness:     Scott Akhtar is a 74 year old male who presents today for ear cleaning.      He was last seen in ENT on 8/27/2024 for ear cleaning.  History of ear itching that improved with the use of mometasone.    He denies acute changes in hearing  No bothersome tinnitus  No vertigo, facial numbness or weakness.      No otalgia, otorrhea.    No hx of COM or otologic surgeries.     Syncopal episode in September, was seen in the emergency department and followed up with primary care, Zio patch was completed and he saw vascular surgery for an aortic aneurysm.    Audiogram completed 10/14/24:    Assessment:  Otoscopic exam:  Right: Canal clear and tympanic membrane visualized.  Left: Canal clear and tympanic membrane visualized.    Tympanometry:  Right: Normal middle ear mobility and ear canal volume.  Left: Normal middle ear mobility and ear canal volume.    Pure tone testing:  Today's evaluation was conducted using conventional audiometry under insert earphones with good reliability.   Right: Mild sloping to moderately severe sensorineural hearing loss  Left: Mild sloping to moderately severe sensorineural hearing loss    Speech reception thresholds results were 35 dB in the right ear and 30 dB in the left ear. SRTs were in good agreement with pure tone measures.  Word recognition score: Presented at levels of a shout.  Right: Excellent (100% @ 75 dB)  Left: Excellent (96% @ 70 dB)     History of left and right thyroid nodule with previous FNA x 2 negative completed 11/10/2022.  He is due for annual thyroid ultrasound, this is scheduled for  April.    Thyroid ultrasound completed 4/17/2024:  FINDINGS: The right lobe of the thyroid measures 5.4 x 2.4 x 1.9 cm.  The left lobe of the thyroid measures 4.3 x 1.4 x 1.3 cm.     The complex nodule seen previously in the left lobe of the thyroid is  no longer visualized. In the right lobe of the thyroid is a 1 x 0.9 x  1 cm in diameter nodule. This nodule has decreased in size from  previous examination. The Tirads score is 4                                                                        IMPRESSION: Continued yearly ultrasound follow-up is recommended            Medications:     Current Outpatient Rx   Medication Sig Dispense Refill     allopurinol (ZYLOPRIM) 100 MG tablet Take 1 tablet (100 mg) by mouth 2 times daily 180 tablet 3     ASPIRIN EC PO Take 81 mg by mouth daily       blood glucose (NO BRAND SPECIFIED) test strip 1 strip by In Vitro route daily Use to test blood sugar 1 times daily or as directed. 100 strip 3     Cholecalciferol (VITAMIN D3 PO) Take 5,000 Units by mouth daily 2000 to 5000       coenzyme Q-10 (CO-Q10) 50 MG capsule Take 2 capsules (100 mg) by mouth daily (Patient taking differently: Take 100 mg by mouth daily. 1 tablet daily) 180 capsule 0     ezetimibe (ZETIA) 10 MG tablet Take 1 tablet (10 mg) by mouth daily 90 tablet 3     hydrochlorothiazide (HYDRODIURIL) 25 MG tablet Take 0.5 tablets (12.5 mg) by mouth daily. 45 tablet 1     ipratropium (ATROVENT) 0.06 % nasal spray Spray 2 sprays into both nostrils 3 times daily as needed for rhinitis. 15 mL 11     metFORMIN (GLUCOPHAGE) 500 MG tablet Take 1 tablet (500 mg) by mouth 2 times daily (with meals). 180 tablet 3     mometasone (ELOCON) 0.1 % external cream Apply topically daily. 15 g 3     Multiple Minerals (CALCIUM-MAGNESIUM-ZINC) TABS Take 1 tablet by mouth daily       nystatin (MYCOSTATIN) 815221 UNIT/GM external powder Apply topically 4 times daily 60 g 1     Omega-3 Fatty Acids (OMEGA-3 FISH OIL PO) Take 1,200 mg by mouth  daily       omeprazole (PRILOSEC) 40 MG DR capsule Take 1 capsule (40 mg) by mouth 2 times daily 60 capsule 11     potassium chloride ER (KLOR-CON) 10 MEQ CR tablet Take 1 tablet (10 mEq) by mouth daily. 90 tablet 3            Allergies:     Allergies: Gluten meal, Losartan, Pseudoephedrine hcl, Simvastatin, Sulfa antibiotics, Privet, and Short ragweed pollen ext          Past Medical History:     Past Medical History:   Diagnosis Date     Aneurysm of thoracic aorta      Arthritis 2019     Diabetes (H)      External thrombosed hemorrhoids      Hiatal hernia      Hypertension      Need for prophylactic vaccination and inoculation against unspecified single bacterial disease      Other chest pain      Thyroid disease 2022    Benign biopsy            Past Surgical History:     Past Surgical History:   Procedure Laterality Date     ABDOMINAL AORTIC ANEURYSM REPAIR       BACK SURGERY      laminectomy L5S1     BIOPSY  1971    Dermatitis herpetiformis     COLONOSCOPY       COLONOSCOPY N/A 10/14/2016    Procedure: COLONOSCOPY;  Surgeon: Daljit Salazar MD;  Location: [facility]     ENDOSCOPY UPPER, COLONOSCOPY, COMBINED N/A 4/15/2024    Procedure: Upper endoscopy with biopsy and colonoscopy;  Surgeon: Bello Lentz MD;  Location: [facility]     ESOPHAGOSCOPY, GASTROSCOPY, DUODENOSCOPY (EGD), COMBINED N/A 6/27/2024    Procedure: ESOPHAGOGASTRODUODENOSCOPY WITH BIOPSY;  Surgeon: Bello Lentz MD;  Location: [facility]     EXCISE LESION BACK N/A 01/16/2023    Procedure: EXCISION OF MASSES ON BACK AND LEFT POSTERIOR ARM;  Surgeon: Bello Lentz MD;  Location: [facility]     EYE SURGERY  2007    Lasik     FINGER GANGLION CYST EXCISION       GENITOURINARY SURGERY  1990    Vasectomy     L5 S1 Laminectomy  01/01/1991     ROTATOR CUFF REPAIR RT/LT  01/01/2006     SHOULDER SURGERY Right     replacement     TONSILLECTOMY  01/01/1971     VASCULAR SURGERY      Aortic aneurysm stent       ENT family history reviewed          "Social History:     Social History     Tobacco Use     Smoking status: Former     Current packs/day: 0.00     Average packs/day: 1 pack/day for 27.0 years (27.0 ttl pk-yrs)     Types: Cigarettes, Cigars, Pipe     Start date: 1961     Quit date: 3/26/1988     Years since quittin.9     Passive exposure: Past     Smokeless tobacco: Never   Vaping Use     Vaping status: Never Used   Substance Use Topics     Alcohol use: Yes     Comment: 3 beers daily      Drug use: Never     Comment: takes a weekly THC gummy for pain control            Review of Systems:     ROS: See HPI         Physical Exam:     /86 (BP Location: Right arm, Patient Position: Sitting, Cuff Size: Adult Large)   Pulse 58   Temp 98  F (36.7  C) (Tympanic)   Resp 16   Ht 1.753 m (5' 9.02\")   Wt 102.5 kg (226 lb)   SpO2 95%   BMI 33.36 kg/m      General - The patient is well nourished and well developed, and appears to have good nutritional status.  Alert and oriented to person and place, answers questions and cooperates with examination appropriately.   Head and Face - Normocephalic and atraumatic, with no gross asymmetry noted.  The facial nerve is intact, with strong symmetric movements.  Voice and Breathing - The patient was breathing comfortably without the use of accessory muscles. There was no wheezing, stridor. The patients voice was clear and strong, and had appropriate pitch and quality.  Ears - The ears were examined with binocular microscopy and with otoscope.  External ears normal. Right canal cerumen impacted.  Left canal cerumen impacted.  The right ear was cleaned with gentle suctioning using #7, #5 [place].   Right tympanic membrane is intact without effusion, retraction or mass. The left ear was cleaned in the same manner.  Left tympanic membrane is intact without effusion, retraction or mass.  Eyes - Extraocular movements intact, sclera were not icteric or injected, conjunctiva were pink and moist.  Mouth - " Examination of the oral cavity showed pink, healthy oral mucosa. Dentition in good condition. No lesions or ulcerations noted. The tongue was mobile and midline.   Throat - The walls of the oropharynx were smooth, pink, moist, symmetric, and had no lesions or ulcerations.  The tonsillar pillars and soft palate were symmetric. The uvula was midline on elevation.    Neck - Full range of motion on passive movement.  Palpation of the occipital, submental, submandibular, internal jugular chain, and supraclavicular nodes did not demonstrate any abnormal lymph nodes or masses.    Nose - External contour is symmetric, no gross deflection or scars.  Nasal mucosa is pink and moist with no abnormal mucus.  The septum and turbinates were evaluated with nasal speculum, no polyps, masses, or purulence noted on examination.         Assessment and Plan:       ICD-10-CM    1. Bilateral impacted cerumen  H61.23       2. Sensorineural hearing loss (SNHL) of both ears  H90.3         Follow for US of thyroid in April  Follow up in ENT in 6 months   Consider trying Astelin nasal spray in the future if drainage remains and no improvement with flonase  Use ipratropium for runny nose - this will help dry up the nose     Shanice COCHRAN  [facility]      Again, thank you for allowing me to participate in the care of your patient.        Sincerely,        Shanice Robledo NP    Electronically signed

## 2025-04-16 ENCOUNTER — HOSPITAL ENCOUNTER (OUTPATIENT)
Dept: ULTRASOUND IMAGING | Facility: HOSPITAL | Age: 75
Discharge: HOME | End: 2025-04-16
Attending: NURSE PRACTITIONER
Payer: MEDICARE

## 2025-04-16 DIAGNOSIS — E04.1 RIGHT THYROID NODULE: ICD-10-CM

## 2025-04-16 PROCEDURE — 76536 US EXAM OF HEAD AND NECK: CPT

## 2025-04-16 PROCEDURE — 76536 US EXAM OF HEAD AND NECK: CPT | Mod: 26 | Performed by: RADIOLOGY

## 2025-04-21 NOTE — PROGRESS NOTES
"  Assessment & Plan     (I10) Essential hypertension  (primary encounter diagnosis)  Plan: Slightly above goal. Have stopped losartan due to prior dizziness episodes. Will not increase or add any new meds at this time due to current dizzy episodes. Will reassess in 6 months.     (E11.9) Controlled type 2 diabetes mellitus without complication, without long-term current use of insulin (H)  Plan:   Controlled.  Hemoglobin A1c 6.7  Albumin Random Urine Quantitative with Creat Ratio normal  Comprehensive metabolic panel (BMP + Alb, Alk Phos, ALT, AST, Total. Bili, TP) normal    (E11.69,  E78.5) Hyperlipidemia associated with type 2 diabetes mellitus (H)  Plan:   Controlled.  Continue Zetia.    (M10.00) Idiopathic gout, unspecified chronicity, unspecified site  Plan:   No recent gout symptoms.   Uric acid normal. C/W current medications.     (R42) Dizziness  Plan:   Had prior episodes of dizziness in Fall 2024 with an associated episode of syncope. He had fairly extensive work up at that time, which was reassuring. Episodes are mild, very short lasting, occur about once a week, and are not aggravated by physical activity, which is also reassuring.  May be orthostatic in nature, encouraged adequate hydration.  May also be vestibular in nature, referred to PT for evaluation and potentially treatment.  Comprehensive metabolic panel (BMP + Alb, Alk Phos, ALT, AST, Total. Bili, TP)  Physical Therapy  Referral    BMI  Estimated body mass index is 33.91 kg/m  as calculated from the following:    Height as of 2/25/25: 1.753 m (5' 9.02\").    Weight as of this encounter: 104.2 kg (229 lb 11.2 oz).       The longitudinal plan of care for the diagnosis(es)/condition(s) as documented were addressed during this visit. Due to the added complexity in care, I will continue to support Scott in the subsequent management and with ongoing continuity of care.    Frances Chavez is a 74 year old, presenting for the following health " issues:  Diabetes, Hypertension, Lipids, and Suture Removal (Placed 4/15/2025)        4/22/2025    12:43 PM   Additional Questions   Roomed by Matias Carpio   Accompanied by None         4/22/2025    12:43 PM   Patient Reported Additional Medications   Patient reports taking the following new medications None     History of Present Illness       Diabetes:   He presents for follow up of diabetes.  He is checking home blood glucose a few times a month.   He checks blood glucose before and after meals.  Blood glucose is never over 200 and never under 70. He is aware of hypoglycemia symptoms including dizziness and blurred vision.    He has no concerns regarding his diabetes at this time.   He is not experiencing numbness or burning in feet, excessive thirst, blurry vision, weight changes or redness, sores or blisters on feet.           Hypertension: He presents for follow up of hypertension.  He does check blood pressure  regularly outside of the clinic. Outpatient blood pressures have not been over 140/90. He does not follow a low salt diet.     Vascular Disease:  He presents for follow up of vascular disease.     He never takes nitroglycerin. He takes daily aspirin.    He eats 2-3 servings of fruits and vegetables daily.He consumes 0 sweetened beverage(s) daily.He exercises with enough effort to increase his heart rate 10 to 19 minutes per day.  He exercises with enough effort to increase his heart rate 3 or less days per week.   He is taking medications regularly.          Diabetes Follow-up  How often are you checking your blood sugar? A few times a month  What time of day are you checking your blood sugars (select all that apply)?  Before and after meals  Have you had any blood sugars above 200?  No  Have you had any blood sugars below 70?  No  What symptoms do you notice when your blood sugar is low?  Not applicable  What concerns do you have today about your diabetes? None   Do you have any of these symptoms?  "(Select all that apply)  No numbness or tingling in feet.  No redness, sores or blisters on feet.  No complaints of excessive thirst.  No reports of blurry vision.  No significant changes to weight.    Metformin 500 mg. On Zetia, does not tolerate statins. BP mildly above goal, stopped losartan due to dizziness. Patient reports goal Bps at home. Eye exam UTD. Diabetic foot exam UTD.      Hyperlipidemia Follow-Up  Are you regularly taking any medication or supplement to lower your cholesterol?   Yes- Zetia   Are you having muscle aches or other side effects that you think could be caused by your cholesterol lowering medication?  No    Zetia. Last lipid panel 6 months ago, well controlled.    Hypertension Follow-up  Do you check your blood pressure regularly outside of the clinic? Yes   Are you following a low salt diet? No  Are your blood pressures ever more than 140 on the top number (systolic) OR more   than 90 on the bottom number (diastolic), for example 140/90? No    Takes BP at home twice a week. Usually around 120/90 at home.    Gout   No gout flares.       BP Readings from Last 2 Encounters:   04/22/25 138/85   02/25/25 131/86     Hemoglobin A1C (%)   Date Value   10/21/2024 6.3 (H)   04/17/2024 6.5 (H)     LDL Cholesterol Calculated (mg/dL)   Date Value   10/21/2024 80   09/08/2023 71     Dizziness    Had prior episodes of dizziness in Fall 2024 with an associated episode of syncope. He had fairly extensive work up at that time, which was reassuring.     Today he reports a return of episodes of dizziness. Describes dizziness as feeling lightheaded, \"spinning\" sensation, does not note room spinning, no pre-syncope symptoms. He primarily notices the dizziness when laying in bed, and sometimes when he bends over and then stands up quickly. Although he notes over the weekend he took down a tree with no episodes of dizziness. Also no chest pain, shortness of breath, or palpitations. Episodes usually last 15-30 " seconds and resolve on their own. They occur maybe once a week. He drinks about 4 8oz glasses of water a day. He has significant hearing damage with constant tinnitus and uses hearing aids. Sometimes repositioning in bed helps relieve symptoms.    Review of Systems  Constitutional, neuro, ENT, endocrine, pulmonary, cardiac, gastrointestinal, genitourinary, musculoskeletal, integument and psychiatric systems are negative, except as otherwise noted.      Objective    /85 (BP Location: Right arm, Patient Position: Sitting, Cuff Size: Adult Large)   Pulse 57   Temp 99.6  F (37.6  C) (Tympanic)   Resp 18   Wt 104.2 kg (229 lb 11.2 oz)   SpO2 95%   BMI 33.91 kg/m    Body mass index is 33.91 kg/m .  Physical Exam   GENERAL: alert and no distress  RESP: lungs clear to auscultation - no rales, rhonchi or wheezes  CV: regular rate and rhythm, normal S1 S2, no murmur, click or rub, no peripheral edema  MS: no gross musculoskeletal defects noted, no edema  NEURO: Normal strength and tone, mentation intact and speech normal  PSYCH: mentation appears normal, affect normal/bright    Results for orders placed or performed in visit on 04/22/25 (from the past 24 hours)   Hemoglobin A1c   Result Value Ref Range    Estimated Average Glucose 146 (H) <117 mg/dL    Hemoglobin A1C 6.7 (H) <5.7 %   Uric acid   Result Value Ref Range    Uric Acid 4.7 3.4 - 7.0 mg/dL   Albumin Random Urine Quantitative with Creat Ratio   Result Value Ref Range    Creatinine Urine mg/dL 117.4 mg/dL    Albumin Urine mg/L <12.0 mg/L    Albumin Urine mg/g Cr     Comprehensive metabolic panel (BMP + Alb, Alk Phos, ALT, AST, Total. Bili, TP)   Result Value Ref Range    Sodium 136 135 - 145 mmol/L    Potassium 4.3 3.4 - 5.3 mmol/L    Carbon Dioxide (CO2) 29 22 - 29 mmol/L    Anion Gap 10 7 - 15 mmol/L    Urea Nitrogen 15.2 8.0 - 23.0 mg/dL    Creatinine 0.89 0.67 - 1.17 mg/dL    GFR Estimate 90 >60 mL/min/1.73m2    Calcium 9.3 8.8 - 10.4 mg/dL     Chloride 97 (L) 98 - 107 mmol/L    Glucose 115 (H) 70 - 99 mg/dL    Alkaline Phosphatase 47 40 - 150 U/L    AST 34 0 - 45 U/L    ALT 50 0 - 70 U/L    Protein Total 7.6 6.4 - 8.3 g/dL    Albumin 4.5 3.5 - 5.2 g/dL    Bilirubin Total 0.3 <=1.2 mg/dL       ANDRY Pugh     I was present with the student who participated in the service and in the documentation of the note. I have verified the history and personally performed the physical exam and medical decision making. I agree with the assessment and plan of care as documented in the note.         Signed Electronically by: Usha Gray NP

## 2025-04-22 ENCOUNTER — OFFICE VISIT (OUTPATIENT)
Dept: FAMILY MEDICINE | Facility: OTHER | Age: 75
End: 2025-04-22
Attending: NURSE PRACTITIONER
Payer: MEDICARE

## 2025-04-22 VITALS
BODY MASS INDEX: 33.91 KG/M2 | RESPIRATION RATE: 18 BRPM | OXYGEN SATURATION: 95 % | WEIGHT: 229.7 LBS | DIASTOLIC BLOOD PRESSURE: 85 MMHG | SYSTOLIC BLOOD PRESSURE: 138 MMHG | HEART RATE: 57 BPM | TEMPERATURE: 99.6 F

## 2025-04-22 DIAGNOSIS — E11.9 CONTROLLED TYPE 2 DIABETES MELLITUS WITHOUT COMPLICATION, WITHOUT LONG-TERM CURRENT USE OF INSULIN (H): ICD-10-CM

## 2025-04-22 DIAGNOSIS — R42 DIZZINESS: ICD-10-CM

## 2025-04-22 DIAGNOSIS — M10.00 IDIOPATHIC GOUT, UNSPECIFIED CHRONICITY, UNSPECIFIED SITE: ICD-10-CM

## 2025-04-22 DIAGNOSIS — I10 ESSENTIAL HYPERTENSION: Primary | ICD-10-CM

## 2025-04-22 DIAGNOSIS — E11.69 HYPERLIPIDEMIA ASSOCIATED WITH TYPE 2 DIABETES MELLITUS (H): ICD-10-CM

## 2025-04-22 DIAGNOSIS — E78.5 HYPERLIPIDEMIA ASSOCIATED WITH TYPE 2 DIABETES MELLITUS (H): ICD-10-CM

## 2025-04-22 LAB
ALBUMIN SERPL BCG-MCNC: 4.5 G/DL (ref 3.5–5.2)
ALP SERPL-CCNC: 47 U/L (ref 40–150)
ALT SERPL W P-5'-P-CCNC: 50 U/L (ref 0–70)
ANION GAP SERPL CALCULATED.3IONS-SCNC: 10 MMOL/L (ref 7–15)
AST SERPL W P-5'-P-CCNC: 34 U/L (ref 0–45)
BILIRUB SERPL-MCNC: 0.3 MG/DL
BUN SERPL-MCNC: 15.2 MG/DL (ref 8–23)
CALCIUM SERPL-MCNC: 9.3 MG/DL (ref 8.8–10.4)
CHLORIDE SERPL-SCNC: 97 MMOL/L (ref 98–107)
CREAT SERPL-MCNC: 0.89 MG/DL (ref 0.67–1.17)
CREAT UR-MCNC: 117.4 MG/DL
EGFRCR SERPLBLD CKD-EPI 2021: 90 ML/MIN/1.73M2
EST. AVERAGE GLUCOSE BLD GHB EST-MCNC: 146 MG/DL
GLUCOSE SERPL-MCNC: 115 MG/DL (ref 70–99)
HBA1C MFR BLD: 6.7 %
HCO3 SERPL-SCNC: 29 MMOL/L (ref 22–29)
MICROALBUMIN UR-MCNC: <12 MG/L
MICROALBUMIN/CREAT UR: NORMAL MG/G{CREAT}
POTASSIUM SERPL-SCNC: 4.3 MMOL/L (ref 3.4–5.3)
PROT SERPL-MCNC: 7.6 G/DL (ref 6.4–8.3)
SODIUM SERPL-SCNC: 136 MMOL/L (ref 135–145)
URATE SERPL-MCNC: 4.7 MG/DL (ref 3.4–7)

## 2025-04-22 PROCEDURE — 82570 ASSAY OF URINE CREATININE: CPT | Mod: ZL | Performed by: NURSE PRACTITIONER

## 2025-04-22 PROCEDURE — 82947 ASSAY GLUCOSE BLOOD QUANT: CPT | Mod: ZL | Performed by: NURSE PRACTITIONER

## 2025-04-22 PROCEDURE — 84155 ASSAY OF PROTEIN SERUM: CPT | Mod: ZL | Performed by: NURSE PRACTITIONER

## 2025-04-22 PROCEDURE — 36415 COLL VENOUS BLD VENIPUNCTURE: CPT | Mod: ZL | Performed by: NURSE PRACTITIONER

## 2025-04-22 PROCEDURE — 82043 UR ALBUMIN QUANTITATIVE: CPT | Mod: ZL | Performed by: NURSE PRACTITIONER

## 2025-04-22 PROCEDURE — 84550 ASSAY OF BLOOD/URIC ACID: CPT | Mod: ZL | Performed by: NURSE PRACTITIONER

## 2025-04-22 PROCEDURE — 83036 HEMOGLOBIN GLYCOSYLATED A1C: CPT | Mod: ZL | Performed by: NURSE PRACTITIONER

## 2025-04-22 ASSESSMENT — PAIN SCALES - GENERAL: PAINLEVEL_OUTOF10: NO PAIN (0)

## 2025-04-29 DIAGNOSIS — E04.1 THYROID NODULE: Primary | ICD-10-CM

## 2025-05-19 ENCOUNTER — HOSPITAL ENCOUNTER (EMERGENCY)
Facility: HOSPITAL | Age: 75
Discharge: HOME | End: 2025-05-19
Attending: NURSE PRACTITIONER
Payer: MEDICARE

## 2025-05-19 ENCOUNTER — APPOINTMENT (OUTPATIENT)
Dept: GENERAL RADIOLOGY | Facility: HOSPITAL | Age: 75
End: 2025-05-19
Attending: NURSE PRACTITIONER
Payer: MEDICARE

## 2025-05-19 VITALS
DIASTOLIC BLOOD PRESSURE: 103 MMHG | HEART RATE: 72 BPM | TEMPERATURE: 96.7 F | SYSTOLIC BLOOD PRESSURE: 166 MMHG | RESPIRATION RATE: 18 BRPM | OXYGEN SATURATION: 92 %

## 2025-05-19 DIAGNOSIS — U07.1 COVID-19 VIRUS INFECTION: ICD-10-CM

## 2025-05-19 LAB
FLUAV RNA SPEC QL NAA+PROBE: NEGATIVE
FLUBV RNA RESP QL NAA+PROBE: NEGATIVE
RSV RNA SPEC NAA+PROBE: NEGATIVE
S PYO DNA THROAT QL NAA+PROBE: NOT DETECTED
SARS-COV-2 RNA RESP QL NAA+PROBE: POSITIVE

## 2025-05-19 PROCEDURE — 87651 STREP A DNA AMP PROBE: CPT | Performed by: NURSE PRACTITIONER

## 2025-05-19 PROCEDURE — 71046 X-RAY EXAM CHEST 2 VIEWS: CPT | Mod: 26 | Performed by: RADIOLOGY

## 2025-05-19 PROCEDURE — 99284 EMERGENCY DEPT VISIT MOD MDM: CPT | Mod: 25

## 2025-05-19 PROCEDURE — 71046 X-RAY EXAM CHEST 2 VIEWS: CPT

## 2025-05-19 PROCEDURE — 99284 EMERGENCY DEPT VISIT MOD MDM: CPT | Performed by: NURSE PRACTITIONER

## 2025-05-19 PROCEDURE — 87637 SARSCOV2&INF A&B&RSV AMP PRB: CPT | Performed by: NURSE PRACTITIONER

## 2025-05-19 ASSESSMENT — ACTIVITIES OF DAILY LIVING (ADL)
ADLS_ACUITY_SCORE: 41
ADLS_ACUITY_SCORE: 41

## 2025-05-19 ASSESSMENT — ENCOUNTER SYMPTOMS
FEVER: 1
SINUS PRESSURE: 1
ENDOCRINE NEGATIVE: 1
VOICE CHANGE: 0
STRIDOR: 0
EYES NEGATIVE: 1
SINUS PAIN: 1
ALLERGIC/IMMUNOLOGIC NEGATIVE: 1
TROUBLE SWALLOWING: 0
SORE THROAT: 1
PSYCHIATRIC NEGATIVE: 1
NEUROLOGICAL NEGATIVE: 1
RHINORRHEA: 0
CARDIOVASCULAR NEGATIVE: 1
SHORTNESS OF BREATH: 0
GASTROINTESTINAL NEGATIVE: 1
COUGH: 1
HEMATOLOGIC/LYMPHATIC NEGATIVE: 1
WHEEZING: 0
MUSCULOSKELETAL NEGATIVE: 1

## 2025-05-19 NOTE — ED TRIAGE NOTES
KIRA Will CNP assessed patient in triage and determined patient not Urgent Care appropriate. Will be seen in Emergency Department.       Here with a 5/10 headache; sinus pressure, dizziness, and sore throat.  Took nyquil this am for cold symptoms.  Symptoms started yesterday.  BEFAST negative.

## 2025-05-19 NOTE — DISCHARGE INSTRUCTIONS
Learn about the COVID19 Study:   A treatment study for those infected with COVID 19.      A research trial conducted by   [facility]    Background Information    Since appearing in [facility] in 2019, the severe acute respiratory syndrome coronavirus 2 (SARS-CoV-2) has caused disease (COVID-19) in over 110,000 people. Over 4,000 people have  in the past several months across 80 countries. The mortality is estimated at up to 3.4% and is especially dangerous for the elderly.    At present there is no current specific treatment for patients with COVID-19. The novel (new) coronavirus is closely related to the severe acute respiratory syndrome coronavirus (SARS-CoV) which caused an outbreak of disease (SARS) in . Attempts to develop a vaccine since SARS have been unsuccessful. New vaccines will take time and may or may not be effective for this or future coronavirus pandemics (wide-spread infections of people around the globe). So far, health officials have been appropriately focused on screening, identification, and containment within and between governments to combat the spread of COVID-19.     Given its rapid spread, patients with COVID-19 would greatly benefit from treatments with an established safety profile readily available for immediate use if the treatment is effective.     What is this study?    This is a multi-center ([facility] and [facility]), double-blinded study of COVID-19 infected patients randomized 1:1 to daily losartan or placebo for 10 days or treatment failure (hospital admission).    Why is this study being done?    The virus COVID-19 uses a specific protein on the surface of your cells to enter the cell. This protein is important to protect the lung from circulating hormones. COVID-19 blocks this protein and damages the lungs. In this study, we want to see if giving people a medication usually used for high blood pressure (called  Losartan) that blocks the activity of this lung damaging hormone might help reduce problems with breathing while you recover from COVID-19.      What Happens in the Study?    People who meet eligibility criteria and agree to participate in this research study will be randomly assigned to receive either Losartan or a placebo (a pill that looks just like Losartan but doesn't have any medicine in it). People enrolled in the research study have a 50:50 chance of taking either pill during this study, like flipping a coin. People will take the study medication for 10 days, at home, with some follow-up phone calls from the research team (up to 28 days from enrolling in the study).    People will also be asked to submit a saliva swab (by pre-paid mail or ) to the research team for analysis.     What if I want to be included in the study?       If you test positive for COVID-19, someone from the COVID-19 testing center will contact you. If you want to learn more or participate in the study, contact the study team. This can be done by calling the hotline, sending an email or visiting the website to answer a few additional pre-screening questions to see if you qualify and to speak with a research study member.    To learn more about COVID19 you can visit the [facility] Website at: [email or web address]    Contact Us  COVID19 Study  [place], [place]    Phone: [phone]  E-mail: [email or web address]    Randomized Controlled Trial of Losartan for Patients with COVID-19 is a research study to reduce severe respiratory symptoms/lung damage from COVID-19.   ____________________________________  The research study has been reviewed by the [facility] Institutional Review Board (IRB).    Senia Lou protocol number: [id number]  [facility] IRB study number:  [id number]    Website:  [email or web address]

## 2025-05-19 NOTE — ED NOTES
Patient to room 5 with his wife, settled on cot and call light with in reach.    Patient states that he and his wife just got back from [place] and they had warning out that there is a new strain of strep going around and if they have a sore throat they need to get in. Patient states he has been coughing and having a sore throat. States his throat is his biggest complaint. Cough is productive with white sputum.

## 2025-05-19 NOTE — ED PROVIDER NOTES
History     Chief Complaint   Patient presents with    Headache    Pharyngitis     HPI  Scott Akhtar is a 75 year old individual with history of DMT2, hypertension, GERD, peripheral artery disease, celiac disease, comes in for sinus congestion with cough.  Patient states that they just got back from [place].  States that strep is going around there.  Last night patient developed headache, sinus pressure, runny nose, sore throat, and cough.  Took NyQuil at home with improvement.  Comes in for evaluation.  States has had a fever.  No rhinorrhea present.  Has had a sore throat with right side being greater than the left.  States that he has a cough with increasing pain in the throat with this.  No shortness of breath.  No wheezes or stridor.  States is coughing up yellow sputum at times.    Allergies:  Allergies   Allergen Reactions    Gluten Meal     Losartan      dizziness    Pseudoephedrine Hcl      Sudafed     Simvastatin GI Disturbance    Sulfa Antibiotics Nausea    Privet Other (See Comments)     Any Ink at all causes him to sneeze uncontrollable.     Short Ragweed Pollen Ext Cough, Itching, Rash and Difficulty breathing       Problem List:    Patient Active Problem List    Diagnosis Date Noted    Celiac disease 05/01/2024     Priority: Medium    Alcohol use 03/08/2023     Priority: Medium    Abnormal cardiovascular stress test on 7/5/2022 10/24/2022     Priority: Medium    Constipation 10/24/2022     Priority: Medium    History of AAA (abdominal aortic aneurysm) repair 10/24/2022     Priority: Medium    Hypertriglyceridemia 10/24/2022     Priority: Medium    Subacute cutaneous lupus erythematosus-rheum did not feel he had systemic lupus; felt to be related to cutaneous lupus 11/30/2021     Priority: Medium    Presence of right artificial shoulder joint 06/15/2021     Priority: Medium    Adverse effect of antihyperlipidemic drug, sequela 12/30/2020     Priority: Medium    Hepatic steatosis 08/17/2020      "Priority: Medium    Hyperlipidemia associated with type 2 diabetes mellitus (H) 08/03/2020     Priority: Medium    PAD (peripheral artery disease) 01/31/2020     Priority: Medium    Disorder of joint prosthesis 02/28/2019     Priority: Medium     Overview:   Added automatically from request for surgery [id number]      Lumbar radiculopathy 10/06/2017     Priority: Medium    History of repair of aneurysm of abdominal aorta using endovascular stent graft 01/26/2017     Priority: Medium    Presence of other vascular implants and grafts 01/26/2017     Priority: Medium    Gout 08/04/2016     Priority: Medium    Obesity with body mass index 30 or greater 07/28/2016     Priority: Medium    Controlled type 2 diabetes mellitus without complication, without long-term current use of insulin (H) 07/27/2016     Priority: Medium     4/15/13:  A1c 6.5  10/13/2020- Diabetes without diabetic retinopathy of both eyes.  Overview:   Overview:   4/15/13:  A1c 6.5      Dermatitis herpetiformis 07/27/2016     Priority: Medium     Overview:   Diagnosed while in the .  Started after visiting [facility] in 1971.  Treats with dapsone four times a week; when he cuts down on the dose he gets a breakout of little water blisters on the backs of his hands, elbows, and knees.  Overview:   Overview:   Diagnosed while in the .  Started after visiting [facility] in 1971.  Treats with dapsone four times a week; when he cuts down on the dose he gets a breakout of little water blisters on the backs of his hands, elbows, and knees.      Essential hypertension 07/27/2016     Priority: Medium    Degenerative scoliosis in adult patient 07/27/2016     Priority: Medium    Sensorineural hearing loss (SNHL) 06/26/2013     Priority: Medium    Diaphragmatic hernia without obstruction or gangrene 12/19/2012     Priority: Medium     Overview:   Overview:   12/19/12, \"small esophageal hiatal hernia\" noted on CT abdomen/pelvis.      Gastroesophageal reflux " disease without esophagitis 01/13/2005     Priority: Medium        Past Medical History:    Past Medical History:   Diagnosis Date    Aneurysm of thoracic aorta     Arthritis 2019    Diabetes (H)     External thrombosed hemorrhoids     Hiatal hernia     Hypertension     Need for prophylactic vaccination and inoculation against unspecified single bacterial disease     Other chest pain     Thyroid disease 2022       Past Surgical History:    Past Surgical History:   Procedure Laterality Date    ABDOMINAL AORTIC ANEURYSM REPAIR      BACK SURGERY      laminectomy L5S1    BIOPSY  1971    Dermatitis herpetiformis    COLONOSCOPY      COLONOSCOPY N/A 10/14/2016    Procedure: COLONOSCOPY;  Surgeon: Daljit Salazar MD;  Location: [facility]    ENDOSCOPY UPPER, COLONOSCOPY, COMBINED N/A 4/15/2024    Procedure: Upper endoscopy with biopsy and colonoscopy;  Surgeon: Bello Lentz MD;  Location: [facility]    ESOPHAGOSCOPY, GASTROSCOPY, DUODENOSCOPY (EGD), COMBINED N/A 6/27/2024    Procedure: ESOPHAGOGASTRODUODENOSCOPY WITH BIOPSY;  Surgeon: Bello Lentz MD;  Location: [facility]    EXCISE LESION BACK N/A 01/16/2023    Procedure: EXCISION OF MASSES ON BACK AND LEFT POSTERIOR ARM;  Surgeon: Bello Lentz MD;  Location: [facility]    EYE SURGERY  2007    Lasik    FINGER GANGLION CYST EXCISION      GENITOURINARY SURGERY  1990    Vasectomy    L5 S1 Laminectomy  01/01/1991    ROTATOR CUFF REPAIR RT/LT  01/01/2006    SHOULDER SURGERY Right     replacement    TONSILLECTOMY  01/01/1971    VASCULAR SURGERY      Aortic aneurysm stent       Family History:    Family History   Problem Relation Age of Onset    Depression Mother     Thyroid Disease Mother     Diabetes Father     Prostate Cancer Father     Hypertension Father     Breast Cancer No family hx of     Colon Cancer No family hx of        Social History:  Marital Status:   [2]  Social History     Tobacco Use    Smoking status: Former     Current packs/day: 0.00      Average packs/day: 1 pack/day for 27.0 years (27.0 ttl pk-yrs)     Types: Cigarettes, Cigars, Pipe     Start date: 1961     Quit date: 3/26/1988     Years since quittin.1     Passive exposure: Past    Smokeless tobacco: Never   Vaping Use    Vaping status: Never Used   Substance Use Topics    Alcohol use: Yes     Comment: 3 beers daily     Drug use: Never     Comment: takes a weekly THC gummy for pain control        Medications:    allopurinol (ZYLOPRIM) 100 MG tablet  ASPIRIN EC PO  blood glucose (NO BRAND SPECIFIED) test strip  Cholecalciferol (VITAMIN D3 PO)  coenzyme Q-10 (CO-Q10) 50 MG capsule  ezetimibe (ZETIA) 10 MG tablet  hydrochlorothiazide (HYDRODIURIL) 25 MG tablet  ipratropium (ATROVENT) 0.06 % nasal spray  metFORMIN (GLUCOPHAGE) 500 MG tablet  mometasone (ELOCON) 0.1 % external cream  Multiple Minerals (CALCIUM-MAGNESIUM-ZINC) TABS  nirmatrelvir and ritonavir (PAXLOVID) 300 mg/100 mg therapy pack  nystatin (MYCOSTATIN) 643307 UNIT/GM external powder  Omega-3 Fatty Acids (OMEGA-3 FISH OIL PO)  omeprazole (PRILOSEC) 40 MG DR capsule  potassium chloride ER (KLOR-CON) 10 MEQ CR tablet          Review of Systems   Constitutional:  Positive for fever.   HENT:  Positive for sinus pressure, sinus pain and sore throat. Negative for ear pain, rhinorrhea, trouble swallowing and voice change.    Eyes: Negative.    Respiratory:  Positive for cough. Negative for shortness of breath, wheezing and stridor.    Cardiovascular: Negative.    Gastrointestinal: Negative.    Endocrine: Negative.    Genitourinary: Negative.    Musculoskeletal: Negative.    Skin: Negative.    Allergic/Immunologic: Negative.    Neurological: Negative.    Hematological: Negative.    Psychiatric/Behavioral: Negative.         Physical Exam   BP: (!) 166/103  Pulse: 72  Temp: (!) 96.7  F (35.9  C)  Resp: 18  SpO2: 97 %      GENERAL APPEARANCE:  The patient is a 75 year old well-developed, well-nourished individual that appears as stated  age.  HEENT:  Normocephalic and atraumatic.  No conjunctival injection is noted.  Oropharynx is erythematous and cobblestoned but no tonsillar hypertrophy or exudate.  Uvula is midline and without swelling.  Mouth revealed no lesions.  Voice is clear and without muffling.  Bilateral nares clear of drainage.  Tympanic membranes are clear.  NECK:  Supple.  Trachea is midline.  No lymphadenopathy or tenderness.    LUNGS:  Breathing is easy.  Breath sounds are dim throughout.  No obvious wheezes, rhonchi, or rales.  Weak dry cough present.  HEART:  Regular rate and rhythm with normal S1 and S2.  No murmurs, gallops, or rubs.  PSYCHIATRIC:  The patient is awake, alert, and oriented x4.  Recent and remote memory is intact.  Appropriate mood and affect.  Calm and cooperative with history and physical exam.  SKIN:  Warm, dry, and well perfused.  Good turgor.  No lesions, nodules, or rashes are noted.  No bruising noted.      ED Course     ED Course as of 05/19/25 1413   Mon May 19, 2025   1251 In to see patient and history/physical completed.    1251 X-ray ordered.   1323 SARS CoV2 PCR(!): Positive  Patient positive for COVID.   1400 This time patient positive for COVID.  Will discharge home with restrictions.   1404 Patient would like Paxlovid.  Will review renal functions and home med list.                 Results for orders placed or performed during the hospital encounter of 05/19/25 (from the past 24 hours)   Influenza A/B, RSV and SARS-CoV2 PCR (COVID-19) Nasopharyngeal    Specimen: Nasopharyngeal; Swab   Result Value Ref Range    Influenza A PCR Negative Negative    Influenza B PCR Negative Negative    RSV PCR Negative Negative    SARS CoV2 PCR Positive (A) Negative    Narrative    Testing was performed using the Xpert Xpress CoV2/Flu/RSV Assay on the [employer] GeneXpert Instrument. This test should be ordered for the detection of SARS-CoV2, influenza, and RSV viruses in individuals with signs and symptoms of  respiratory tract infection. This test is for in vitro diagnostic use under the [place] FDA for laboratories certified under CLIA to perform high or moderate complexity testing. This test has been [place] FDA cleared. A negative result does not rule out the presence of PCR inhibitors in the specimen or target RNA in concentration below the limit of detection for the assay. If only one viral target is positive but coinfection with multiple targets is suspected, the sample should be re-tested with another FDA cleared, approved, or authorized test, if coninfection would change clinical management. This test was validated by the [facility]. These laboratories are certified under the Clinical Laboratory Improvement Amendments of 1988 (CLIA-88) as qualified to perfom high complexity laboratory testing.   Group A Streptococcus PCR Throat Swab    Specimen: Throat; Swab   Result Value Ref Range    Group A strep by PCR Not Detected Not Detected    Narrative    The Xpert Xpress Strep A test, performed on the [employer], is a rapid, qualitative in vitro diagnostic test for the detection of Streptococcus pyogenes (Group A ß-hemolytic Streptococcus, Strep A) in throat swab specimens from patients with signs and symptoms of pharyngitis. The Xpert Xpress Strep A test can be used as an aid in the diagnosis of Group A Streptococcal pharyngitis. The assay is not intended to monitor treatment for Group A Streptococcus infections. The Xpert Xpress Strep A test utilizes an automated real-time polymerase chain reaction (PCR) to detect Streptococcus pyogenes DNA.   XR Chest 2 Views    Narrative    PROCEDURE:  XR CHEST 2 VIEWS    HISTORY: Cough, .    COMPARISON:  9/23/2024    FINDINGS:  The cardiomediastinal contours are stable. The trachea is midline.  No focal consolidation, effusion or pneumothorax.    No suspicious osseous lesion or subdiaphragmatic free air.      Impression    IMPRESSION:      No discrete  consolidation.      DIANA PRASAD MD         SYSTEM ID:  [id number]       Medications - No data to display    Assessments & Plan (with Medical Decision Making)     I have reviewed the nursing notes.    I have reviewed the findings, diagnosis, plan and need for follow up with the patient.    Summary:  Patient presents to the ER today sinus congestion with sore throat and cough.  Potential diagnosis which have been considered and evaluated include strep, sinusitis, viral URI, pneumonia, mono, as well as others. Many of these have been excluded using the various modalities and assessment as noted on the chart. At the present time, the diagnosis is COVID infection  Upon arrival, vitals signs show blood pressure 166/103 with a pulse of 72.  Temperature 96.7  F P respirations 18 with oxygenation 97% on room air.  The patient is alert and oriented no distress.  Does have erythematous/cobblestoned posterior oropharynx with no tonsillar hypertrophy or exudate.  No lymphadenopathy present.  Does have weak dry cough noted with dim lung sounds throughout.  Strep test was conducted and was negative.  Influenza, COVID, RSV was positive for COVID.  Chest x-ray personally reviewed showing no acute cardiopulmonary abnormality.  Patient positive for COVID.  Vital signs normal.  Will discharge home with Paxlovid.  Did review drug interactions and patient is cleared.  Renal functions normal looking at old lab work.  Quarantine precautions per COVID guidelines as discussed in AVS.  Follow-up with PCP as needed and return to ER for new or worsening symptoms.  Patient verbalized understanding to plan of care.  Patient discharged home.        Critical Care Time: None    Impression and plan discussed with patient. Questions answered, concerns addressed, indications for urgent re-evaluation reviewed, and  given. Patient/Parent/Caregiver agree with treatment plan and have no further questions at this time.  AVS provided at  discharge.    This document was prepared using a combination of typing and voice generated software.  While every attempt was made for accuracy, spelling and grammatical errors may exist.              New Prescriptions    NIRMATRELVIR AND RITONAVIR (PAXLOVID) 300 MG/100 MG THERAPY PACK    Take 3 tablets by mouth 2 times daily for 5 days.       Final diagnoses:   COVID-19 virus infection       5/19/2025   [facility]       Rick Adamson APRN CNP  05/19/25 [id number]

## 2025-05-31 ENCOUNTER — HEALTH MAINTENANCE LETTER (OUTPATIENT)
Age: 75
End: 2025-05-31

## 2025-06-18 DIAGNOSIS — M10.00 IDIOPATHIC GOUT, UNSPECIFIED CHRONICITY, UNSPECIFIED SITE: ICD-10-CM

## 2025-06-18 RX ORDER — ALLOPURINOL 100 MG/1
100 TABLET ORAL 2 TIMES DAILY
Qty: 180 TABLET | Refills: 3 | Status: SHIPPED | OUTPATIENT
Start: 2025-06-18

## 2025-06-18 NOTE — TELEPHONE ENCOUNTER
allopurinol (ZYLOPRIM) 100 MG tablet         Last Written Prescription Date:  3/8/23  Last Fill Quantity: 180,   # refills: 3  Last Office Visit: 1/8/25  Future Office visit:    Next 5 appointments (look out 90 days)      Aug 18, 2025 2:00 PM  (Arrive by 1:45 PM)  Return Visit with Shanice Robledo NP  [facility] [place] ([facility] [place] ) [address]  [place] [address]  [phone]             Routing refill request to provider for review/approval because:  Drug not on the [facility], [facility] or  Health refill protocol or controlled substance

## 2025-06-21 ENCOUNTER — HEALTH MAINTENANCE LETTER (OUTPATIENT)
Age: 75
End: 2025-06-21

## 2025-06-30 DIAGNOSIS — K21.00 GASTROESOPHAGEAL REFLUX DISEASE WITH ESOPHAGITIS, UNSPECIFIED WHETHER HEMORRHAGE: ICD-10-CM

## 2025-06-30 RX ORDER — OMEPRAZOLE 40 MG/1
40 CAPSULE, DELAYED RELEASE ORAL 2 TIMES DAILY
Qty: 60 CAPSULE | Refills: 11 | Status: SHIPPED | OUTPATIENT
Start: 2025-06-30

## 2025-06-30 NOTE — TELEPHONE ENCOUNTER
omeprazole (PRILOSEC) 40 MG DR capsule         Last Written Prescription Date:  5/16/24  Last Fill Quantity: 60,   # refills: 11  Last Office Visit: 4/22/25  Future Office visit:    Next 5 appointments (look out 90 days)      Aug 18, 2025 2:00 PM  (Arrive by 1:45 PM)  Return Visit with Shanice Robledo NP  [facility] [place] ([facility] [place] ) [address]  [place] [address]  [phone]             Routing refill request to provider for review/approval because:  Drug not on the [facility], P or [facility] refill protocol or controlled substance

## 2025-08-29 ENCOUNTER — OFFICE VISIT (OUTPATIENT)
Dept: OTOLARYNGOLOGY | Facility: OTHER | Age: 75
End: 2025-08-29
Attending: NURSE PRACTITIONER
Payer: MEDICARE

## 2025-08-29 VITALS
OXYGEN SATURATION: 96 % | SYSTOLIC BLOOD PRESSURE: 144 MMHG | DIASTOLIC BLOOD PRESSURE: 86 MMHG | TEMPERATURE: 97 F | HEIGHT: 69 IN | BODY MASS INDEX: 33.91 KG/M2 | RESPIRATION RATE: 16 BRPM | HEART RATE: 59 BPM

## 2025-08-29 ASSESSMENT — PAIN SCALES - GENERAL: PAINLEVEL_OUTOF10: NO PAIN (0)

## (undated) DEVICE — SU VICRYL 3-0 SH 27" UND J416H

## (undated) DEVICE — ENDO BITE BLOCK ADULT OLYMPUS LATEX FREE MAJ-1632

## (undated) DEVICE — SU MONOCRYL 4-0 PS-2 18" UND Y496G

## (undated) DEVICE — TUBING SUCTION 20FT N620A

## (undated) DEVICE — CANISTER SUCTION MEDI-VAC GUARDIAN 2000ML 90D 65651-220

## (undated) DEVICE — GLOVE 8.5 PROTEXIS PI CLSC PF BD CUF STRL LF 12IN 2D72PL85X

## (undated) DEVICE — CONNECTOR ERBEFLO 2 PORT 20325-215

## (undated) DEVICE — ESU GROUND PAD ADULT W/CORD E7507

## (undated) DEVICE — PACK LAPAROTOMY CUSTOM SBA32LPMBG

## (undated) DEVICE — SOL NACL 0.9% IRRIG 1000ML BOTTLE 2F7124

## (undated) DEVICE — SUCTION MANIFOLD NEPTUNE 2 SYS 1 PORT 702-025-000

## (undated) DEVICE — SYR 30ML SLIP TIP W/O NDL 302833

## (undated) DEVICE — COVER LT HANDLE 2/PK 5160-2FG

## (undated) DEVICE — JELLY LUBRICATING SURGILUBE 2OZ TUBE

## (undated) DEVICE — PREP CHLORAPREP 26ML TINTED HI-LITE ORANGE 930815

## (undated) DEVICE — SOL WATER IRRIG 1000ML BOTTLE 2F7114

## (undated) DEVICE — FORCEPS BIOPSY RADIAL JAW 4 LARGE W/NEEDLE 240CM M00513332

## (undated) DEVICE — FORCEP COLON BIOPSY STD W/NEEDLE 160CM M00513390

## (undated) DEVICE — LABEL STERILE PREPRINTED FOR OR FRRH01-2M

## (undated) DEVICE — SLEEVE SCD EXPRESS KNEE LENGTH MED 9529

## (undated) DEVICE — STPL SKIN 35W 6.9MM  PXW35

## (undated) DEVICE — PACK BASIN SET UP SUTCNBSBBA

## (undated) DEVICE — DRESSING MEPILEX BORDER AG 3" X 3" (7.5CM X 7.5CM) 395290

## (undated) DEVICE — SU VICRYL 0 CT-1 36" J946H

## (undated) RX ORDER — PROPOFOL 10 MG/ML
INJECTION, EMULSION INTRAVENOUS
Status: DISPENSED
Start: 2024-06-27

## (undated) RX ORDER — GLYCOPYRROLATE 0.2 MG/ML
INJECTION, SOLUTION INTRAMUSCULAR; INTRAVENOUS
Status: DISPENSED
Start: 2023-01-16

## (undated) RX ORDER — ONDANSETRON 2 MG/ML
INJECTION INTRAMUSCULAR; INTRAVENOUS
Status: DISPENSED
Start: 2023-01-16

## (undated) RX ORDER — PROPOFOL 10 MG/ML
INJECTION, EMULSION INTRAVENOUS
Status: DISPENSED
Start: 2024-04-15

## (undated) RX ORDER — FENTANYL CITRATE 50 UG/ML
INJECTION, SOLUTION INTRAMUSCULAR; INTRAVENOUS
Status: DISPENSED
Start: 2023-01-16

## (undated) RX ORDER — KETOROLAC TROMETHAMINE 30 MG/ML
INJECTION, SOLUTION INTRAMUSCULAR; INTRAVENOUS
Status: DISPENSED
Start: 2023-01-16

## (undated) RX ORDER — LABETALOL 20 MG/4 ML (5 MG/ML) INTRAVENOUS SYRINGE
Status: DISPENSED
Start: 2023-01-16